# Patient Record
Sex: MALE | Race: BLACK OR AFRICAN AMERICAN
[De-identification: names, ages, dates, MRNs, and addresses within clinical notes are randomized per-mention and may not be internally consistent; named-entity substitution may affect disease eponyms.]

---

## 2017-06-26 ENCOUNTER — HOSPITAL ENCOUNTER (OUTPATIENT)
Dept: HOSPITAL 83 - LAB | Age: 47
End: 2017-06-26
Attending: INTERNAL MEDICINE
Payer: COMMERCIAL

## 2017-06-26 DIAGNOSIS — E11.65: ICD-10-CM

## 2017-06-26 DIAGNOSIS — E11.22: ICD-10-CM

## 2017-06-26 DIAGNOSIS — N18.2: ICD-10-CM

## 2017-06-26 DIAGNOSIS — E55.9: ICD-10-CM

## 2017-06-26 DIAGNOSIS — I12.9: Primary | ICD-10-CM

## 2017-06-26 LAB
25(OH)D3 SERPL-MCNC: 24.2 NG/ML (ref 30–100)
ABSOLUTE BASO #: 0.1 10*3/UL (ref 0–0.1)
ABSOLUTE EOS #: 0.2 10*3/UL (ref 0–0.4)
ABSOLUTE NEUT #: 7.6 10*3/UL (ref 2.3–7.9)
ALBUMIN SERPL-MCNC: 3.9 GM/DL (ref 3.1–4.5)
ALBUMIN: 3.9 GM/DL (ref 3.1–4.5)
ALP BLD-CCNC: 82 U/L (ref 45–117)
ALP SERPL-CCNC: 82 U/L (ref 45–117)
ALT SERPL W P-5'-P-CCNC: 36 U/L (ref 12–78)
ALT SERPL-CCNC: 36 U/L (ref 12–78)
AST SERPL-CCNC: 18 IU/L (ref 3–35)
AST SERPL-CCNC: 18 IU/L (ref 3–35)
BASOPHILS # BLD AUTO: 0.1 10*3/UL (ref 0–0.1)
BASOPHILS %: 0.7 % (ref 0–1)
BASOPHILS NFR BLD AUTO: 0.7 % (ref 0–1)
BILIRUB SERPL-MCNC: 0.8 MG/DL (ref 0.2–1)
BUN BLDV-MCNC: 10 MG/DL (ref 7–24)
BUN SERPL-MCNC: 10 MG/DL (ref 7–24)
CALCIUM SERPL-MCNC: 8.8 MG/DL (ref 8.5–10.5)
CHLORIDE BLD-SCNC: 106 MMOL/L (ref 98–107)
CHLORIDE SERPL-SCNC: 106 MMOL/L (ref 98–107)
CHOLEST SERPL-MCNC: 149 MG/DL (ref ?–200)
CHOLESTEROL: 149 MG/DL
CO2 SERPL-SCNC: 24 MMOL/L (ref 21–32)
CO2: 24 MMOL/L (ref 21–32)
CREAT SERPL-MCNC: 1.24 MG/DL (ref 0.7–1.3)
EOSINOPHIL # BLD AUTO: 0.2 10*3/UL (ref 0–0.4)
EOSINOPHIL # BLD AUTO: 1.4 % (ref 1–4)
EOSINOPHILS %: 1.4 % (ref 1–4)
ERYTHROCYTE [DISTWIDTH] IN BLOOD BY AUTOMATED COUNT: 12.8 % (ref 0–14.5)
EST. AVERAGE GLUCOSE BLD GHB EST-MCNC: 200 MG/DL
ESTIMATED AVERAGE GLUCOSE: 200
GFR AFRICAN AMERICAN: > 60 ML/MIN
GFR SERPL CREATININE-BSD FRML MDRD: >60 ML/MIN/
GLUCOSE SERPL-MCNC: 171 MG/DL (ref 65–99)
GLUCOSE: 171 MG/DL (ref 65–99)
HBA1C MFR BLD: 8.6 % (ref 4.8–5.6)
HCT VFR BLD AUTO: 47.9 % (ref 42–52)
HCT VFR BLD CALC: 47.9 % (ref 42–52)
HDLC SERPL-MCNC: 48 MG/DL (ref 40–60)
HDLC SERPL-MCNC: 48 MG/DL (ref 40–60)
HEMOGLOBIN: 16.7 G/DL (ref 14–18)
HGB BLD-MCNC: 16.7 G/DL (ref 14–18)
IG #: 0.1 10*3/UL (ref 0–0.1)
IMMATURE GRANULOCYTES #: 0.1 10*3/UL (ref 0–0.1)
IMMATURE GRANULOCYTES: 0.6 % (ref 0–1)
LDL CHOLESTEROL: 77 MG/DL (ref 9–159)
LDLC SERPL DIRECT ASSAY-MCNC: 77 MG/DL (ref 9–159)
LYMPHOCYTE %: 21.3 % (ref 27–41)
LYMPHOCYTES # BLD AUTO: 2.4 10*3/UL (ref 1.3–4.4)
LYMPHOCYTES # BLD: 2.4 10*3/UL (ref 1.3–4.4)
LYMPHOCYTES NFR BLD AUTO: 21.3 % (ref 27–41)
MCH RBC QN AUTO: 30.8 PG (ref 27–31)
MCH RBC QN AUTO: 30.8 PG (ref 27–31)
MCHC RBC AUTO-ENTMCNC: 34.9 G/DL (ref 33–37)
MCHC RBC AUTO-ENTMCNC: 34.9 G/DL (ref 33–37)
MCV RBC AUTO: 88.4 FL (ref 80–94)
MCV RBC AUTO: 88.4 FL (ref 80–94)
MONOCYTES # BLD AUTO: 1 10*3/UL (ref 0.1–1)
MONOCYTES # BLD: 1 10*3/UL (ref 0.1–1)
MONOCYTES %: 9.1 % (ref 3–9)
MONOCYTES NFR BLD MANUAL: 9.1 % (ref 3–9)
NEUT #: 7.6 10*3/UL (ref 2.3–7.9)
NEUT %: 66.9 % (ref 47–73)
NEUTROPHILS %: 66.9 % (ref 47–73)
NRBC BLD QL AUTO: 0 % (ref 0–0)
NUCLEATED RED BLOOD CELLS: 0 % (ref 0–0)
PDW BLD-RTO: 12.8 % (ref 0–14.5)
PHOSPHATE SERPL-MCNC: 2.5 MG/DL (ref 2.5–4.9)
PHOSPHORUS: 2.5 MG/DL (ref 2.5–4.9)
PLATELET # BLD AUTO: 321 10*3/UL (ref 130–400)
PLATELET # BLD: 321 10*3/UL (ref 130–400)
PMV BLD AUTO: 9.9 FL (ref 9.6–12.3)
PMV BLD AUTO: 9.9 FL (ref 9.6–12.3)
POTASSIUM SERPL-SCNC: 4.1 MMOL/L (ref 3.5–5.1)
POTASSIUM SERPL-SCNC: 4.1 MMOL/L (ref 3.5–5.1)
PROT SERPL-MCNC: 8 GM/DL (ref 6.4–8.2)
PTH INTACT: 58.9 PG/ML (ref 14–72)
PTH-INTACT SERPL-MCNC: 58.9 PG/ML (ref 14–72)
RBC # BLD AUTO: 5.42 10*6/UL (ref 4.5–5.9)
RBC # BLD: 5.42 10*6/UL (ref 4.5–5.9)
SODIUM BLD-SCNC: 139 MMOL/L (ref 136–145)
SODIUM SERPL-SCNC: 139 MMOL/L (ref 136–145)
TOTAL PROTEIN: 8 GM/DL (ref 6.4–8.2)
TRIGL SERPL-MCNC: 119 MG/DL
TRIGL SERPL-MCNC: 119 MG/DL (ref ?–150)
VITAMIN D 25-HYDROXY: 24.2 NG/ML (ref 30–100)
VLDLC SERPL CALC-MCNC: 24 MG/DL (ref 6–40)
VLDLC SERPL CALC-MCNC: 24 MG/DL (ref 6–40)
WBC # BLD: 11.3 10*3/UL (ref 4.8–10.8)
WBC NRBC COR # BLD AUTO: 11.3 10*3/UL (ref 4.8–10.8)

## 2017-06-27 LAB
CREATININE URINE: 215.1 MG/DL
MICRO ALBUMIN/CRE RATIO: 339.2 (ref 0–30)
MICROALBUMIN/CREAT 24H UR: 729.7 UG/ML
MICROALBUMIN/CREAT UR-RTO: 339.2 (ref 0–30)

## 2018-04-18 ENCOUNTER — HOSPITAL ENCOUNTER (EMERGENCY)
Dept: HOSPITAL 83 - ED | Age: 48
Discharge: HOME | End: 2018-04-18
Payer: COMMERCIAL

## 2018-04-18 VITALS — WEIGHT: 275 LBS | HEIGHT: 60 IN

## 2018-04-18 VITALS — SYSTOLIC BLOOD PRESSURE: 120 MMHG | DIASTOLIC BLOOD PRESSURE: 40 MMHG
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
## 2018-04-18 DIAGNOSIS — Z79.899: ICD-10-CM

## 2018-04-18 DIAGNOSIS — S93.402A: Primary | ICD-10-CM
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
## 2018-04-18 DIAGNOSIS — S50.02XA: ICD-10-CM
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
## 2018-06-05 ENCOUNTER — HOSPITAL ENCOUNTER (OUTPATIENT)
Dept: HOSPITAL 83 - LAB | Age: 48
Discharge: HOME | End: 2018-06-05
Attending: INTERNAL MEDICINE
Payer: COMMERCIAL

## 2018-06-05 DIAGNOSIS — R79.89: ICD-10-CM

## 2018-06-05 DIAGNOSIS — E11.65: ICD-10-CM

## 2018-06-05 DIAGNOSIS — I12.9: Primary | ICD-10-CM

## 2018-06-05 DIAGNOSIS — N18.2: ICD-10-CM

## 2018-06-05 LAB
25(OH)D3 SERPL-MCNC: 24.6 NG/ML (ref 30–100)
ABSOLUTE BASO #: 0.1 10*3/UL (ref 0–0.1)
ABSOLUTE EOS #: 0.2 10*3/UL (ref 0–0.4)
ABSOLUTE NEUT #: 7.2 10*3/UL (ref 2.3–7.9)
ALBUMIN SERPL-MCNC: 4.3 GM/DL (ref 3.1–4.5)
ALBUMIN: 4.3 GM/DL (ref 3.1–4.5)
ALP BLD-CCNC: 86 U/L (ref 45–117)
ALP SERPL-CCNC: 86 U/L (ref 45–117)
ALT SERPL W P-5'-P-CCNC: 29 U/L (ref 12–78)
ALT SERPL-CCNC: 29 U/L (ref 12–78)
AST SERPL-CCNC: 18 IU/L (ref 3–35)
AST SERPL-CCNC: 18 IU/L (ref 3–35)
BASOPHILS # BLD AUTO: 0.1 10*3/UL (ref 0–0.1)
BASOPHILS %: 0.6 % (ref 0–1)
BASOPHILS NFR BLD AUTO: 0.6 % (ref 0–1)
BILIRUB SERPL-MCNC: 0.4 MG/DL (ref 0.2–1)
BUN BLDV-MCNC: 8 MG/DL (ref 7–24)
BUN SERPL-MCNC: 8 MG/DL (ref 7–24)
CALCIUM SERPL-MCNC: 9.1 MG/DL (ref 8.5–10.5)
CHLORIDE BLD-SCNC: 103 MMOL/L (ref 98–107)
CHLORIDE SERPL-SCNC: 103 MMOL/L (ref 98–107)
CO2: 27 MMOL/L (ref 21–32)
CREAT SERPL-MCNC: 1.18 MG/DL (ref 0.7–1.3)
CREAT SERPL-MCNC: 1.18 MG/DL (ref 0.7–1.3)
EOSINOPHIL # BLD AUTO: 0.2 10*3/UL (ref 0–0.4)
EOSINOPHIL # BLD AUTO: 1.6 % (ref 1–4)
EOSINOPHILS %: 1.6 % (ref 1–4)
ERYTHROCYTE [DISTWIDTH] IN BLOOD BY AUTOMATED COUNT: 12.7 % (ref 0–14.5)
GFR AFRICAN AMERICAN: > 60 ML/MIN
GFR SERPL CREATININE-BSD FRML MDRD: >60 ML/MIN/
GLUCOSE: 113 MG/DL (ref 65–99)
HCT VFR BLD AUTO: 48.6 % (ref 42–52)
HCT VFR BLD CALC: 48.6 % (ref 42–52)
HEMOGLOBIN: 16.6 G/DL (ref 14–18)
HGB BLD-MCNC: 16.6 G/DL (ref 14–18)
IMMATURE GRANULOCYTES #: 0.1 10*3/UL (ref 0–0.1)
IMMATURE GRANULOCYTES: 0.6 % (ref 0–1)
LYMPHOCYTE %: 24.3 % (ref 27–41)
LYMPHOCYTES # BLD AUTO: 2.8 10*3/UL (ref 1.3–4.4)
LYMPHOCYTES # BLD: 2.8 10*3/UL (ref 1.3–4.4)
LYMPHOCYTES NFR BLD AUTO: 24.3 % (ref 27–41)
MCH RBC QN AUTO: 30.6 PG (ref 27–31)
MCH RBC QN AUTO: 30.6 PG (ref 27–31)
MCHC RBC AUTO-ENTMCNC: 34.2 G/DL (ref 33–37)
MCHC RBC AUTO-ENTMCNC: 34.2 G/DL (ref 33–37)
MCV RBC AUTO: 89.7 FL (ref 80–94)
MCV RBC AUTO: 89.7 FL (ref 80–94)
MONOCYTES # BLD AUTO: 1.1 10*3/UL (ref 0.1–1)
MONOCYTES # BLD: 1.1 10*3/UL (ref 0.1–1)
MONOCYTES %: 9.8 % (ref 3–9)
MONOCYTES NFR BLD MANUAL: 9.8 % (ref 3–9)
NEUT #: 7.2 10*3/UL (ref 2.3–7.9)
NEUT %: 63.1 % (ref 47–73)
NEUTROPHILS %: 63.1 % (ref 47–73)
NRBC BLD QL AUTO: 0 10*3/UL (ref 0–0)
NUCLEATED RED BLOOD CELLS: 0 % (ref 0–0)
PDW BLD-RTO: 12.7 % (ref 0–14.5)
PHOSPHATE SERPL-MCNC: 3 MG/DL (ref 2.5–4.9)
PHOSPHORUS: 3 MG/DL (ref 2.5–4.9)
PLATELET # BLD AUTO: 309 10*3/UL (ref 130–400)
PLATELET # BLD: 309 10*3/UL (ref 130–400)
PMV BLD AUTO: 9.8 FL (ref 9.6–12.3)
PMV BLD AUTO: 9.8 FL (ref 9.6–12.3)
POTASSIUM SERPL-SCNC: 3.7 MMOL/L (ref 3.5–5.1)
POTASSIUM SERPL-SCNC: 3.7 MMOL/L (ref 3.5–5.1)
PROT SERPL-MCNC: 8.2 GM/DL (ref 6.4–8.2)
PTH INTACT: 53.6 PG/ML (ref 14–72)
PTH-INTACT SERPL-MCNC: 53.6 PG/ML (ref 14–72)
RBC # BLD AUTO: 5.42 10*6/UL (ref 4.5–5.9)
RBC # BLD: 5.42 10*6/UL (ref 4.5–5.9)
SODIUM BLD-SCNC: 140 MMOL/L (ref 136–145)
SODIUM SERPL-SCNC: 140 MMOL/L (ref 136–145)
TOTAL PROTEIN: 8.2 GM/DL (ref 6.4–8.2)
VITAMIN D 25-HYDROXY: 24.6 NG/ML (ref 30–100)
WBC # BLD: 11.4 10*3/UL (ref 4.8–10.8)
WBC NRBC COR # BLD AUTO: 11.4 10*3/UL (ref 4.8–10.8)

## 2018-06-06 LAB
CREAT UR-MCNC: 210.1 MG/DL
CREATININE URINE: 210.1 MG/DL
MICRO ALBUMIN/CRE RATIO: 168.4 (ref 0–30)
MICROALBUMIN/CREAT 24H UR: 353.9 UG/ML
MICROALBUMIN/CREAT UR-RTO: 168.4 (ref 0–30)

## 2018-10-05 ENCOUNTER — HOSPITAL ENCOUNTER (OUTPATIENT)
Dept: HOSPITAL 83 - MRI | Age: 48
Discharge: HOME | End: 2018-10-05
Attending: PSYCHIATRY & NEUROLOGY
Payer: COMMERCIAL

## 2018-10-05 DIAGNOSIS — M79.632: ICD-10-CM

## 2018-10-05 DIAGNOSIS — M54.12: Primary | ICD-10-CM

## 2018-12-16 ENCOUNTER — HOSPITAL ENCOUNTER (INPATIENT)
Dept: HOSPITAL 83 - ED | Age: 48
LOS: 1 days | Discharge: HOME | DRG: 392 | End: 2018-12-17
Attending: INTERNAL MEDICINE | Admitting: INTERNAL MEDICINE
Payer: COMMERCIAL

## 2018-12-16 VITALS — HEIGHT: 72.99 IN | WEIGHT: 286.06 LBS | BODY MASS INDEX: 37.91 KG/M2

## 2018-12-16 VITALS — DIASTOLIC BLOOD PRESSURE: 79 MMHG

## 2018-12-16 VITALS — DIASTOLIC BLOOD PRESSURE: 64 MMHG

## 2018-12-16 VITALS — DIASTOLIC BLOOD PRESSURE: 43 MMHG

## 2018-12-16 VITALS — DIASTOLIC BLOOD PRESSURE: 49 MMHG

## 2018-12-16 DIAGNOSIS — E66.01: ICD-10-CM

## 2018-12-16 DIAGNOSIS — G47.30: ICD-10-CM

## 2018-12-16 DIAGNOSIS — E87.8: ICD-10-CM

## 2018-12-16 DIAGNOSIS — Z79.84: ICD-10-CM

## 2018-12-16 DIAGNOSIS — E11.22: ICD-10-CM

## 2018-12-16 DIAGNOSIS — K21.9: Primary | ICD-10-CM

## 2018-12-16 DIAGNOSIS — E83.41: ICD-10-CM

## 2018-12-16 DIAGNOSIS — F90.9: ICD-10-CM

## 2018-12-16 DIAGNOSIS — N18.9: ICD-10-CM

## 2018-12-16 DIAGNOSIS — E11.65: ICD-10-CM

## 2018-12-16 DIAGNOSIS — Z80.9: ICD-10-CM

## 2018-12-16 DIAGNOSIS — D72.829: ICD-10-CM

## 2018-12-16 LAB
ALBUMIN SERPL-MCNC: 4 GM/DL (ref 3.1–4.5)
ALP SERPL-CCNC: 88 U/L (ref 45–117)
ALT SERPL W P-5'-P-CCNC: 40 U/L (ref 12–78)
APTT PPP: 24.5 SECONDS (ref 20.8–31.5)
AST SERPL-CCNC: 19 IU/L (ref 3–35)
BASOPHILS # BLD AUTO: 0.1 10*3/UL (ref 0–0.1)
BASOPHILS NFR BLD AUTO: 0.7 % (ref 0–1)
BUN SERPL-MCNC: 12 MG/DL (ref 7–24)
CHLORIDE SERPL-SCNC: 108 MMOL/L (ref 98–107)
CREAT SERPL-MCNC: 1.35 MG/DL (ref 0.7–1.3)
EOSINOPHIL # BLD AUTO: 0.2 10*3/UL (ref 0–0.4)
EOSINOPHIL # BLD AUTO: 1.9 % (ref 1–4)
ERYTHROCYTE [DISTWIDTH] IN BLOOD BY AUTOMATED COUNT: 12.6 % (ref 0–14.5)
HCT VFR BLD AUTO: 47.5 % (ref 42–52)
HGB BLD-MCNC: 16.4 G/DL (ref 14–18)
INR BLD: 0.9 (ref 2–3.5)
LYMPHOCYTES # BLD AUTO: 2.3 10*3/UL (ref 1.3–4.4)
LYMPHOCYTES NFR BLD AUTO: 20.5 % (ref 27–41)
MCH RBC QN AUTO: 31.4 PG (ref 27–31)
MCHC RBC AUTO-ENTMCNC: 34.5 G/DL (ref 33–37)
MCV RBC AUTO: 91 FL (ref 80–94)
MONOCYTES # BLD AUTO: 1.2 10*3/UL (ref 0.1–1)
MONOCYTES NFR BLD MANUAL: 10.9 % (ref 3–9)
NEUT #: 7.3 10*3/UL (ref 2.3–7.9)
NEUT %: 64.8 % (ref 47–73)
NRBC BLD QL AUTO: 0 10*3/UL (ref 0–0)
PLATELET # BLD AUTO: 302 10*3/UL (ref 130–400)
PMV BLD AUTO: 9.7 FL (ref 9.6–12.3)
POTASSIUM SERPL-SCNC: 4.6 MMOL/L (ref 3.5–5.1)
PROT SERPL-MCNC: 8.1 GM/DL (ref 6.4–8.2)
RBC # BLD AUTO: 5.22 10*6/UL (ref 4.5–5.9)
SODIUM SERPL-SCNC: 143 MMOL/L (ref 136–145)
TROPONIN I SERPL-MCNC: < 0.015 NG/ML (ref ?–0.04)
WBC NRBC COR # BLD AUTO: 11.4 10*3/UL (ref 4.8–10.8)

## 2018-12-17 VITALS — DIASTOLIC BLOOD PRESSURE: 55 MMHG | SYSTOLIC BLOOD PRESSURE: 105 MMHG

## 2018-12-17 VITALS — SYSTOLIC BLOOD PRESSURE: 115 MMHG | DIASTOLIC BLOOD PRESSURE: 65 MMHG

## 2018-12-17 VITALS — DIASTOLIC BLOOD PRESSURE: 57 MMHG

## 2018-12-17 VITALS — SYSTOLIC BLOOD PRESSURE: 104 MMHG | DIASTOLIC BLOOD PRESSURE: 64 MMHG

## 2018-12-17 LAB
BASOPHILS # BLD AUTO: 0.1 10*3/UL (ref 0–0.1)
BASOPHILS NFR BLD AUTO: 0.6 % (ref 0–1)
BUN SERPL-MCNC: 16 MG/DL (ref 7–24)
CHLORIDE SERPL-SCNC: 107 MMOL/L (ref 98–107)
CHOLEST SERPL-MCNC: 141 MG/DL (ref ?–200)
CREAT SERPL-MCNC: 1.3 MG/DL (ref 0.7–1.3)
EOSINOPHIL # BLD AUTO: 0.4 10*3/UL (ref 0–0.4)
EOSINOPHIL # BLD AUTO: 3.3 % (ref 1–4)
ERYTHROCYTE [DISTWIDTH] IN BLOOD BY AUTOMATED COUNT: 12.8 % (ref 0–14.5)
HCT VFR BLD AUTO: 46 % (ref 42–52)
HDLC SERPL-MCNC: 31 MG/DL (ref 40–60)
HGB BLD-MCNC: 15.5 G/DL (ref 14–18)
LDLC SERPL DIRECT ASSAY-MCNC: 64 MG/DL (ref 9–159)
LYMPHOCYTES # BLD AUTO: 2.9 10*3/UL (ref 1.3–4.4)
LYMPHOCYTES NFR BLD AUTO: 23.2 % (ref 27–41)
MCH RBC QN AUTO: 30.9 PG (ref 27–31)
MCHC RBC AUTO-ENTMCNC: 33.7 G/DL (ref 33–37)
MCV RBC AUTO: 91.6 FL (ref 80–94)
MONOCYTES # BLD AUTO: 1.3 10*3/UL (ref 0.1–1)
MONOCYTES NFR BLD MANUAL: 10.5 % (ref 3–9)
NEUT #: 7.7 10*3/UL (ref 2.3–7.9)
NEUT %: 61.8 % (ref 47–73)
NRBC BLD QL AUTO: 0 10*3/UL (ref 0–0)
PHOSPHATE SERPL-MCNC: 3.3 MG/DL (ref 2.5–4.9)
PLATELET # BLD AUTO: 285 10*3/UL (ref 130–400)
PMV BLD AUTO: 9.8 FL (ref 9.6–12.3)
POTASSIUM SERPL-SCNC: 4.4 MMOL/L (ref 3.5–5.1)
RBC # BLD AUTO: 5.02 10*6/UL (ref 4.5–5.9)
SODIUM SERPL-SCNC: 142 MMOL/L (ref 136–145)
T4 FREE SERPL-MCNC: 0.84 NG/DL (ref 0.76–1.46)
TRIGL SERPL-MCNC: 232 MG/DL (ref ?–150)
TSH SERPL DL<=0.005 MIU/L-ACNC: 3.31 UIU/ML (ref 0.36–4.75)
VLDLC SERPL CALC-MCNC: 46 MG/DL (ref 6–40)
WBC NRBC COR # BLD AUTO: 12.4 10*3/UL (ref 4.8–10.8)

## 2018-12-17 PROCEDURE — 3E073KZ INTRODUCTION OF OTHER DIAGNOSTIC SUBSTANCE INTO CORONARY ARTERY, PERCUTANEOUS APPROACH: ICD-10-PCS | Performed by: INTERNAL MEDICINE

## 2018-12-17 PROCEDURE — 4A02XM4 MEASUREMENT OF CARDIAC TOTAL ACTIVITY, EXTERNAL APPROACH: ICD-10-PCS | Performed by: INTERNAL MEDICINE

## 2019-09-13 ENCOUNTER — HOSPITAL ENCOUNTER (OUTPATIENT)
Dept: HOSPITAL 83 - LAB | Age: 49
Discharge: HOME | End: 2019-09-13
Attending: INTERNAL MEDICINE
Payer: COMMERCIAL

## 2019-09-13 DIAGNOSIS — E11.65: ICD-10-CM

## 2019-09-13 DIAGNOSIS — N18.2: ICD-10-CM

## 2019-09-13 DIAGNOSIS — E11.22: ICD-10-CM

## 2019-09-13 DIAGNOSIS — I12.9: Primary | ICD-10-CM

## 2019-09-13 LAB
25(OH)D3 SERPL-MCNC: 22 NG/ML (ref 30–100)
ALBUMIN SERPL-MCNC: 4 GM/DL (ref 3.1–4.5)
ALP SERPL-CCNC: 78 U/L (ref 45–117)
ALT SERPL W P-5'-P-CCNC: 37 U/L (ref 12–78)
AST SERPL-CCNC: 18 IU/L (ref 3–35)
BASOPHILS # BLD AUTO: 0.1 10*3/UL (ref 0–0.1)
BASOPHILS NFR BLD AUTO: 0.7 % (ref 0–1)
BUN SERPL-MCNC: 12 MG/DL (ref 7–24)
CHLORIDE SERPL-SCNC: 107 MMOL/L (ref 98–107)
CHOLEST SERPL-MCNC: 148 MG/DL (ref ?–200)
CREAT SERPL-MCNC: 1.24 MG/DL (ref 0.7–1.3)
EOSINOPHIL # BLD AUTO: 0.3 10*3/UL (ref 0–0.4)
EOSINOPHIL # BLD AUTO: 2.3 % (ref 1–4)
ERYTHROCYTE [DISTWIDTH] IN BLOOD BY AUTOMATED COUNT: 12.9 % (ref 0–14.5)
HCT VFR BLD AUTO: 48.9 % (ref 42–52)
HDLC SERPL-MCNC: 44 MG/DL (ref 40–60)
HGB BLD-MCNC: 16.6 G/DL (ref 14–18)
LDLC SERPL DIRECT ASSAY-MCNC: 75 MG/DL (ref 9–159)
LYMPHOCYTES # BLD AUTO: 2.5 10*3/UL (ref 1.3–4.4)
LYMPHOCYTES NFR BLD AUTO: 20.8 % (ref 27–41)
MCH RBC QN AUTO: 31.3 PG (ref 27–31)
MCHC RBC AUTO-ENTMCNC: 33.9 G/DL (ref 33–37)
MCV RBC AUTO: 92.3 FL (ref 80–94)
MONOCYTES # BLD AUTO: 1.2 10*3/UL (ref 0.1–1)
MONOCYTES NFR BLD MANUAL: 9.9 % (ref 3–9)
NEUT #: 7.9 10*3/UL (ref 2.3–7.9)
NEUT %: 65.5 % (ref 47–73)
NRBC BLD QL AUTO: 0 % (ref 0–0)
PHOSPHATE SERPL-MCNC: 2 MG/DL (ref 2.5–4.9)
PLATELET # BLD AUTO: 342 10*3/UL (ref 130–400)
PMV BLD AUTO: 9.6 FL (ref 9.6–12.3)
POTASSIUM SERPL-SCNC: 3.6 MMOL/L (ref 3.5–5.1)
PROT SERPL-MCNC: 8 GM/DL (ref 6.4–8.2)
PTH-INTACT SERPL-MCNC: 80.9 PG/ML (ref 18.5–88)
RBC # BLD AUTO: 5.3 10*6/UL (ref 4.5–5.9)
SODIUM SERPL-SCNC: 140 MMOL/L (ref 136–145)
TRIGL SERPL-MCNC: 146 MG/DL (ref ?–150)
VLDLC SERPL CALC-MCNC: 29 MG/DL (ref 6–40)
WBC NRBC COR # BLD AUTO: 12.1 10*3/UL (ref 4.8–10.8)

## 2019-09-14 LAB
CREAT UR-MCNC: 180.4 MG/DL
MICRO ALBUMIN/CRE RATIO: 102.9 (ref 0–30)

## 2020-07-16 ENCOUNTER — HOSPITAL ENCOUNTER (OUTPATIENT)
Dept: GENERAL RADIOLOGY | Age: 50
Discharge: HOME | End: 2020-07-18
Payer: COMMERCIAL

## 2020-07-16 ENCOUNTER — HOSPITAL ENCOUNTER (OUTPATIENT)
Age: 50
Discharge: HOME | End: 2020-07-18
Payer: COMMERCIAL

## 2020-07-16 PROCEDURE — 72170 X-RAY EXAM OF PELVIS: CPT

## 2020-07-16 PROCEDURE — 73564 X-RAY EXAM KNEE 4 OR MORE: CPT

## 2020-09-28 ENCOUNTER — TELEPHONE (OUTPATIENT)
Dept: NEUROSURGERY | Age: 50
End: 2020-09-28

## 2020-10-13 ENCOUNTER — HOSPITAL ENCOUNTER (OUTPATIENT)
Age: 50
Discharge: HOME | End: 2020-10-15

## 2020-10-13 PROCEDURE — 88305 TISSUE EXAM BY PATHOLOGIST: CPT

## 2020-10-13 PROCEDURE — 88342 IMHCHEM/IMCYTCHM 1ST ANTB: CPT

## 2020-10-19 ENCOUNTER — INITIAL CONSULT (OUTPATIENT)
Dept: NEUROSURGERY | Age: 50
End: 2020-10-19
Payer: COMMERCIAL

## 2020-10-19 VITALS
HEART RATE: 79 BPM | SYSTOLIC BLOOD PRESSURE: 117 MMHG | HEIGHT: 73 IN | TEMPERATURE: 97.8 F | BODY MASS INDEX: 37.77 KG/M2 | WEIGHT: 285 LBS | DIASTOLIC BLOOD PRESSURE: 82 MMHG

## 2020-10-19 PROCEDURE — 99203 OFFICE O/P NEW LOW 30 MIN: CPT | Performed by: PHYSICIAN ASSISTANT

## 2020-10-19 PROCEDURE — G8427 DOCREV CUR MEDS BY ELIG CLIN: HCPCS | Performed by: PHYSICIAN ASSISTANT

## 2020-10-19 PROCEDURE — 1036F TOBACCO NON-USER: CPT | Performed by: PHYSICIAN ASSISTANT

## 2020-10-19 PROCEDURE — G8484 FLU IMMUNIZE NO ADMIN: HCPCS | Performed by: PHYSICIAN ASSISTANT

## 2020-10-19 PROCEDURE — G8417 CALC BMI ABV UP PARAM F/U: HCPCS | Performed by: PHYSICIAN ASSISTANT

## 2020-10-19 RX ORDER — PIOGLITAZONEHYDROCHLORIDE 15 MG/1
TABLET ORAL
COMMUNITY
Start: 2020-10-14

## 2020-10-19 RX ORDER — ALPRAZOLAM 0.5 MG/1
TABLET ORAL PRN
COMMUNITY

## 2020-10-19 RX ORDER — METHYLPHENIDATE HYDROCHLORIDE 27 MG/1
27 TABLET ORAL PRN
COMMUNITY
End: 2022-05-16

## 2020-10-19 RX ORDER — DULAGLUTIDE 1.5 MG/.5ML
1.5 INJECTION, SOLUTION SUBCUTANEOUS WEEKLY
COMMUNITY
Start: 2020-10-16

## 2020-10-19 RX ORDER — GLIMEPIRIDE 4 MG/1
4 TABLET ORAL 2 TIMES DAILY
COMMUNITY

## 2020-10-19 RX ORDER — PANTOPRAZOLE SODIUM 40 MG/1
40 TABLET, DELAYED RELEASE ORAL DAILY
COMMUNITY

## 2020-10-19 NOTE — PROGRESS NOTES
[address]  NEUROSURGERY     Patient: Luis M Gordon  : 1970  MRN: [id number]    Date of Service: 10/19/2020    Reason for Referral: Back Pain     History of Present Illness: This is a 53 yo male who presents with back pain x 1 year s/p lifting injury. States he heard and felt a \"pop\" at the time of the incident. C/o 6-7/10 pain on average, constant, and a combination of descriptors. Admits to radiation into the left hip and groin--states his hip and groin pain tend to be at the worse. F/U with GS to r/o hernia--no surgical issues. Admits to some n/t following the same pattern. Denies leg weakness. Has tried PT and chiropractic care with some relief. Denies seeing a Pain Specialist. No loss of bowel or bladder function. Ambulating independently. Allergies:   Patient has no known allergies. Past Medical History:      Diagnosis Date    Acid reflux     DM [facility]diabetes mellitus) ([facility] [address]     Protein in urine        Surgical History:      Procedure Laterality Date    PATELLA SURGERY Right     UPPER GASTROINTESTINAL ENDOSCOPY  10/2020       Social History:   reports that he has never smoked. He has never used smokeless tobacco.   has no history on file for alcohol. Family History:  No family history on file.     Review of Systems:  Denies fever, chills, or night sweats  Denies headache, dizziness, syncope  Denies blurred vision, double vision  Denies chest pain, palpitations, SOB  Denies diarrhea, constipation, n/v  Denies dysuria, hematuria  Denies recent infections  Denies easy bruising  Denies anxiety, depression    Physical Exam:  WDWN, resting comfortable, no apparent distress  Appears stated age  Vitals stable  Non-labored breathing   A&O x 3, normal affect   Head is normocephalic, atraumatic   No palpable lymphadenopathy   Abdomen soft, nontender  Pupils equal and reactive, no scleral icterus  EOMI bilaterally  Cranial nerves II-XII intact bilaterally  No drift  5/5 in BUE  5/5 in BLE  Sensation to LT intact x 4 ext  Toes going down  Skin warm and dry  +Left CARMELA    Review of Imaging: None     Assessment: Patient with back and left hip pain. Stable.      Plan:  -Activities as tolerated  -MRI lumbar spine  -Hip x-rays  -Will call with results and treatment plan

## 2020-10-19 NOTE — PATIENT INSTRUCTIONS

## 2020-10-19 NOTE — LETTER
[address] [address]  Phone: [phone]  Fax: [phone]    Love Pandya MD        2020       Patient: Naila Sylvester   MR Number: [id number]   YOB: 1970   Date of Visit: 10/19/2020       Dear Dr. Perez Blancas:    Thank you for the request for consultation for Naila Sylvester to me for the evaluation. Below are the relevant portions of my assessment and plan of care. Vitals:    10/19/20 1149   BP: 117/82   Site: Left Upper Arm   Position: Sitting   Pulse: 79   Temp: 97.8 °F (36.6 °C)   Weight: 285 lb (129.3 kg)   Height: 6' 1\" (1.854 m)     [address] [place] [address]     Patient: Naila Sylvester  : 1970  MRN: [id number]    Date of Service: 10/19/2020    Reason for Referral: Back Pain     History of Present Illness: This is a 55 yo male who presents with back pain x 1 year s/p lifting injury. States he heard and felt a \"pop\" at the time of the incident. C/o 6-7/10 pain on average, constant, and a combination of descriptors. Admits to radiation into the left hip and groin--states his hip and groin pain tend to be at the worse. F/U with GS to r/o hernia--no surgical issues. Admits to some n/t following the same pattern. Denies leg weakness. Has tried PT and chiropractic care with some relief. Denies seeing a Pain Specialist. No loss of bowel or bladder function. Ambulating independently. Allergies:   Patient has no known allergies. Past Medical History:      Diagnosis Date    Acid reflux     DM (diabetes mellitus) ([facility] [address]     Protein in urine        Surgical History:      Procedure Laterality Date    PATELLA SURGERY Right     UPPER GASTROINTESTINAL ENDOSCOPY  10/2020       Social History:   reports that he has never smoked. He has never used smokeless tobacco.   has no history on file for alcohol. Family History:  No family history on file.     Review of Systems: Denies fever, chills, or night sweats  Denies headache, dizziness, syncope  Denies blurred vision, double vision  Denies chest pain, palpitations, SOB  Denies diarrhea, constipation, n/v  Denies dysuria, hematuria  Denies recent infections  Denies easy bruising  Denies anxiety, depression    Physical Exam:  WDWN, resting comfortable, no apparent distress  Appears stated age  Vitals stable  Non-labored breathing   A&O x 3, normal affect   Head is normocephalic, atraumatic   No palpable lymphadenopathy   Abdomen soft, nontender  Pupils equal and reactive, no scleral icterus  EOMI bilaterally  Cranial nerves II-XII intact bilaterally  No drift  5/5 in BUE  5/5 in BLE  Sensation to LT intact x 4 ext  Toes going down  Skin warm and dry  +Left CARMELA    Review of Imaging: None     Assessment: Patient with back and left hip pain. Stable. Plan:  -Activities as tolerated  -MRI lumbar spine  -Hip x-rays  -Will call with results and treatment plan                 If you have questions, please do not hesitate to call me. I look forward to following Genaro along with you.     Sincerely,        POLLY Davalos    CC providers:  Shivani Burks  [address] [place] [address]

## 2020-11-05 ENCOUNTER — TELEPHONE (OUTPATIENT)
Dept: NEUROSURGERY | Age: 50
End: 2020-11-05

## 2020-11-05 NOTE — TELEPHONE ENCOUNTER
Spoke with patient regarding Denial for MRI. Patient previously provided Peer to Peer #[phone] option 1 stating anyone can do the peer to peer and if we call and inform them he has seen a chiropractor, Janell Mills [phone] and Dr. Owen Carrera [phone] for the last year they will approve the MRI. Requested notes twice and have not received them. Spoke with Ashli Quintanilla regarding Peer to Peer. Per Ashli Quintanilla, MRI was denied because patient needs 6 weeks of PT before they will approve it and insurance will not approve the MRI if she calls and tries to do a Peer to Peer with him going to just the chiropractor-they will deny it. Patient notified and informed him insurance companies tell patients chiropractor notes are enough to approve MRI's but they want PT. Patient got loud and stated he doesn't mind going to PT but \"for what the MRI is to show was he needs to go to PT for\". I was not able to speak due to patient. He stated he is going off of the \"raw data\" and that is he spoke to me asking about PT and chiropractor notes and then he spoke with his insurance and was told his MRI was sent to his insurance without the chiropractor notes. I informed patient I contacted his chiropractors office for his notes twice and I have not received any notes to send to his insurance. Patient stated \"I'll take care of this. You'll be hearing from me! \" and then hung up on me.

## 2020-12-20 ENCOUNTER — HOSPITAL ENCOUNTER (EMERGENCY)
Age: 50
Discharge: HOME | End: 2020-12-20
Attending: EMERGENCY MEDICINE
Payer: COMMERCIAL

## 2020-12-20 VITALS
HEART RATE: 98 BPM | BODY MASS INDEX: 37.77 KG/M2 | RESPIRATION RATE: 18 BRPM | TEMPERATURE: 97.3 F | DIASTOLIC BLOOD PRESSURE: 81 MMHG | HEIGHT: 73 IN | WEIGHT: 285 LBS | SYSTOLIC BLOOD PRESSURE: 134 MMHG | OXYGEN SATURATION: 98 %

## 2020-12-20 PROCEDURE — 90715 TDAP VACCINE 7 YRS/> IM: CPT | Performed by: STUDENT IN AN ORGANIZED HEALTH CARE EDUCATION/TRAINING PROGRAM

## 2020-12-20 PROCEDURE — 90471 IMMUNIZATION ADMIN: CPT | Performed by: STUDENT IN AN ORGANIZED HEALTH CARE EDUCATION/TRAINING PROGRAM

## 2020-12-20 PROCEDURE — 99283 EMERGENCY DEPT VISIT LOW MDM: CPT

## 2020-12-20 PROCEDURE — 6360000002 HC RX W HCPCS: Performed by: STUDENT IN AN ORGANIZED HEALTH CARE EDUCATION/TRAINING PROGRAM

## 2020-12-20 PROCEDURE — 10060 I&D ABSCESS SIMPLE/SINGLE: CPT

## 2020-12-20 RX ORDER — LIDOCAINE HYDROCHLORIDE AND EPINEPHRINE 10; 10 MG/ML; UG/ML
INJECTION, SOLUTION INFILTRATION; PERINEURAL
Status: DISCONTINUED
Start: 2020-12-20 | End: 2020-12-20 | Stop reason: HOSPADM

## 2020-12-20 RX ORDER — HYDROCODONE BITARTRATE AND ACETAMINOPHEN 5; 325 MG/1; MG/1
1 TABLET ORAL EVERY 8 HOURS PRN
Qty: 9 TABLET | Refills: 0 | Status: SHIPPED | OUTPATIENT
Start: 2020-12-20 | End: 2020-12-23

## 2020-12-20 RX ORDER — SULFAMETHOXAZOLE AND TRIMETHOPRIM 800; 160 MG/1; MG/1
1 TABLET ORAL 2 TIMES DAILY
Qty: 14 TABLET | Refills: 0 | Status: SHIPPED | OUTPATIENT
Start: 2020-12-20 | End: 2020-12-27

## 2020-12-20 RX ORDER — LIDOCAINE HYDROCHLORIDE 10 MG/ML
5 INJECTION, SOLUTION INFILTRATION; PERINEURAL ONCE
Status: DISCONTINUED | OUTPATIENT
Start: 2020-12-20 | End: 2020-12-20 | Stop reason: HOSPADM

## 2020-12-20 RX ORDER — MUPIROCIN CALCIUM 20 MG/G
CREAM TOPICAL
Qty: 1 TUBE | Refills: 0 | Status: SHIPPED | OUTPATIENT
Start: 2020-12-20 | End: 2021-01-19

## 2020-12-20 RX ADMIN — TETANUS TOXOID, REDUCED DIPHTHERIA TOXOID AND ACELLULAR PERTUSSIS VACCINE, ADSORBED 0.5 ML: 5; 2.5; 8; 8; 2.5 SUSPENSION INTRAMUSCULAR at 08:37

## 2020-12-20 ASSESSMENT — ENCOUNTER SYMPTOMS
DIARRHEA: 0
NAUSEA: 0
ABDOMINAL DISTENTION: 0
SHORTNESS OF BREATH: 0
ABDOMINAL PAIN: 0
COLOR CHANGE: 0
VOMITING: 0
CONSTIPATION: 0

## 2020-12-20 ASSESSMENT — PAIN SCALES - GENERAL: PAINLEVEL_OUTOF10: 10

## 2020-12-20 ASSESSMENT — PAIN DESCRIPTION - LOCATION: LOCATION: UMBILICUS

## 2020-12-20 ASSESSMENT — PAIN DESCRIPTION - DESCRIPTORS: DESCRIPTORS: ACHING;CONSTANT;DISCOMFORT

## 2020-12-20 ASSESSMENT — PAIN DESCRIPTION - ONSET: ONSET: SUDDEN

## 2020-12-20 ASSESSMENT — PAIN DESCRIPTION - FREQUENCY: FREQUENCY: CONTINUOUS

## 2020-12-20 ASSESSMENT — PAIN DESCRIPTION - PAIN TYPE: TYPE: ACUTE PAIN

## 2020-12-20 ASSESSMENT — PAIN DESCRIPTION - PROGRESSION: CLINICAL_PROGRESSION: GRADUALLY WORSENING

## 2020-12-20 NOTE — ED PROVIDER NOTES
for adenopathy. Does not bruise/bleed easily. Psychiatric/Behavioral: Negative for agitation. Physical Exam  Vitals signs and nursing note reviewed. Constitutional:       General: He is not in acute distress. Appearance: Normal appearance. He is not ill-appearing or diaphoretic. HENT:      Head: Normocephalic and atraumatic. Nose: Nose normal.   Eyes:      Extraocular Movements: Extraocular movements intact. Pupils: Pupils are equal, round, and reactive to light. Neck:      Musculoskeletal: Normal range of motion. Cardiovascular:      Rate and Rhythm: Normal rate and regular rhythm. Pulses: Normal pulses. Heart sounds: Normal heart sounds. Pulmonary:      Effort: Pulmonary effort is normal.      Breath sounds: Normal breath sounds. Abdominal:      General: Bowel sounds are normal.      Palpations: Abdomen is soft. Tenderness: There is no abdominal tenderness. There is no right CVA tenderness, left CVA tenderness or rebound. Negative signs include Bell's sign, Rovsing's sign and McBurney's sign. Comments: At umbilicus there is a mildly tender fluctuant mass. There is area of induration above it. Measures 2 cm x 2 cm. No areas of cellulitis. No areas of erythema. Musculoskeletal: Normal range of motion. Skin:     General: Skin is warm and dry. Capillary Refill: Capillary refill takes less than 2 seconds. Neurological:      General: No focal deficit present. Mental Status: He is alert and oriented to person, place, and time. Psychiatric:         Mood and Affect: Mood normal.            Procedures   PROCEDURE  12/21/20       Time:     INCISION AND DRAINAGE  Risks, benefits and alternatives (for applicable procedures below) described. Performed By: Rob Bradley MD.    Indication: Abscess  Informed consent obtained: The patient provided verbal consent for this procedure. .  Prep: The skin was cleansed with povidone iodine and draped in a sterile fashion. Anesthetic: The wound area was anesthetized with Lidocaine 1% with epinephrine. Incision: Soft tissue abscess of umbilicus was Incised by scalpal and minimal fluid was drained. A wound culture was not obtained. The wound  was not irrigated and was not packed with iodoform gauze. The wound was then covered with a sterile dressing. Patient tolerated the procedure well. SKIN BEDSIDE ULTRASOUND     Risks, benefits and alternatives (for applicable procedures below) described. Performed By: Audrey Marcelo DO. Indication:  Pain, deformity. Informed consent: The patient provided verbal consent for this procedure. .  Procedural Quadrants:     REGION: Umbilicus (shows evidence of superficial abscess          This procedure was performed by Audrey Marcelo on 12/21/20 at 6:39 AM      MDM  Number of Diagnoses or Management Options  Abscess  Diagnosis management comments: 57-year-old male with past medical history of diabetes recently treated for an abscess on his umbilicus who has been outpatient antibiotics for the last 5 days. States has been worsening. Vitals within normal limits. On physical exam patient in no acute distress, speaking full sounds, alert and oriented x3. His abdomen is soft nontender, no rebound or guarding. Patient does have a small 2 cm x 2 cm fluctuant mass at the total position of his umbilicus. There is surrounding induration. It is mildly tender to touch. There is no extended areas of cellulitis or streaking. Ultrasound was placed on patient's abscess it is 0.5 cm below skin wall. The area was cleaned, draped, anesthetized and incised. Only 2 cc of serous fluid removed. Unable to get any purulent discharge. Did not want to go further with the I&D as it is at the patient's umbilicus. Patient informed of all the results of evaluation. He was given Bactrim and bacitracin at discharge. He was given close follow-up with his primary care physician.   He is agreeable plan. Patient is awake alert, hemodynamic stable, afebrile and in no respiratory distress. Discussed with patient plan for close outpatient follow-up with the patient's PCP as well as return precautions and the patient understands and agrees to the plan.                  --------------------------------------------- PAST HISTORY ---------------------------------------------  Past Medical History:  has a past medical history of Acid reflux, DM (diabetes mellitus) ([facility] [address] and Protein in urine. Past Surgical History:  has a past surgical history that includes Patella surgery (Right) and Upper gastrointestinal endoscopy (10/2020). Social History:  reports that he has never smoked. He has never used smokeless tobacco. He reports current alcohol use. He reports that he does not use drugs. Family History: family history is not on file. The patients home medications have been reviewed. Allergies: Patient has no known allergies. -------------------------------------------------- RESULTS -------------------------------------------------  Labs:  No results found for this visit on 12/20/20. Radiology:  No orders to display       ------------------------- NURSING NOTES AND VITALS REVIEWED ---------------------------  Date / Time Roomed:  12/20/2020  7:20 AM  ED Bed Assignment:  20/20    The nursing notes within the ED encounter and vital signs as below have been reviewed. /81   Pulse 98   Temp 97.3 °F (36.3 °C) (Temporal)   Resp 18   Ht 6' 1\" (1.854 m)   Wt 285 lb (129.3 kg)   SpO2 98%   BMI 37.60 kg/m²   Oxygen Saturation Interpretation: normal      ------------------------------------------ PROGRESS NOTES ------------------------------------------    I have spoken with the patient and discussed todays results, in addition to providing specific details for the plan of care and counseling regarding the diagnosis and prognosis.   Their questions are answered at this time and they are agreeable with the plan. I discussed at length with them reasons for immediate return here for re evaluation. They will followup with their PCP by calling their office tomorrow. --------------------------------- ADDITIONAL PROVIDER NOTES ---------------------------------  At this time the patient is without objective evidence of an acute process requiring hospitalization or inpatient management. They have remained hemodynamically stable throughout their entire ED visit and are stable for discharge with outpatient follow-up. The plan has been discussed in detail and they are aware of the specific conditions for emergent return, as well as the importance of follow-up. Discharge Medication List as of 12/20/2020  8:35 AM      START taking these medications    Details   sulfamethoxazole-trimethoprim (BACTRIM DS;SEPTRA DS) 800-160 MG per tablet Take 1 tablet by mouth 2 times daily for 7 days, Disp-14 tablet, R-0Print      mupirocin (BACTROBAN) 2 % cream Apply topically 3 times daily. , Disp-1 Tube, R-0, Print      HYDROcodone-acetaminophen (NORCO) 5-325 MG per tablet Take 1 tablet by mouth every 8 hours as needed for Pain for up to 3 days. Intended supply: 3 days. Take lowest dose possible to manage pain, Disp-9 tablet, R-0Print             Diagnosis:  1. Abscess        Disposition:  Patient's disposition: Discharge to home  Patient's condition is stable. Eliana Dumont MD  Resident  12/21/20 6851       Eliana Dumont MD  Resident  12/21/20 3440    ATTENDING PROVIDER ATTESTATION:     I have personally performed and/or participated in the history, exam, medical decision making, and procedures and agree with all pertinent clinical information unless otherwise noted. I have also reviewed and agree with the past medical, family and social history unless otherwise noted.     I have discussed this patient in detail with the resident and provided the instruction and education regarding the evidence-based evaluation and treatment of Other (Umbilical pain; Seen at Urgent Care 12/15/20; On Cephalexin)    My plan: Symptomatic and supportive care. Patient seen and examined. Patient has small umbilical abscess. Is felt this could be drained and a small incision was made in the area we did not get a great deal of purulent drainage out but it did get smaller. Patient did state some relief. He was discharged with additional Bactrim and bacitracin and was to continue Keflex. He was given reasons to return to the emergency department and felt safe for discharge at this time. He stated understanding and was agreeable with plan.     Electronically signed by Arelis Garica DO on 12/21/20 at 6:39 AM CELESTE Garcia DO  12/21/20 5805

## 2021-04-07 ENCOUNTER — TELEPHONE (OUTPATIENT)
Dept: NEUROSURGERY | Age: 51
End: 2021-04-07

## 2021-04-07 DIAGNOSIS — G89.29 CHRONIC LEFT-SIDED LOW BACK PAIN, UNSPECIFIED WHETHER SCIATICA PRESENT: ICD-10-CM

## 2021-04-07 DIAGNOSIS — M25.552 HIP PAIN, CHRONIC, LEFT: ICD-10-CM

## 2021-04-07 DIAGNOSIS — M54.50 CHRONIC LEFT-SIDED LOW BACK PAIN, UNSPECIFIED WHETHER SCIATICA PRESENT: ICD-10-CM

## 2021-04-07 DIAGNOSIS — G89.29 HIP PAIN, CHRONIC, LEFT: ICD-10-CM

## 2021-05-03 ENCOUNTER — OFFICE VISIT (OUTPATIENT)
Dept: NEUROSURGERY | Age: 51
End: 2021-05-03
Payer: COMMERCIAL

## 2021-05-03 VITALS
DIASTOLIC BLOOD PRESSURE: 71 MMHG | BODY MASS INDEX: 37.37 KG/M2 | HEART RATE: 68 BPM | WEIGHT: 282 LBS | HEIGHT: 73 IN | SYSTOLIC BLOOD PRESSURE: 124 MMHG

## 2021-05-03 DIAGNOSIS — M54.16 LUMBAR RADICULOPATHY: Primary | ICD-10-CM

## 2021-05-03 PROCEDURE — G8427 DOCREV CUR MEDS BY ELIG CLIN: HCPCS | Performed by: PHYSICIAN ASSISTANT

## 2021-05-03 PROCEDURE — 1036F TOBACCO NON-USER: CPT | Performed by: PHYSICIAN ASSISTANT

## 2021-05-03 PROCEDURE — G8417 CALC BMI ABV UP PARAM F/U: HCPCS | Performed by: PHYSICIAN ASSISTANT

## 2021-05-03 PROCEDURE — 99213 OFFICE O/P EST LOW 20 MIN: CPT | Performed by: PHYSICIAN ASSISTANT

## 2021-05-03 RX ORDER — CYCLOBENZAPRINE HCL 10 MG
10 TABLET ORAL 3 TIMES DAILY PRN
Qty: 30 TABLET | Refills: 0 | Status: SHIPPED | OUTPATIENT
Start: 2021-05-03 | End: 2021-05-13

## 2021-05-03 RX ORDER — GABAPENTIN 300 MG/1
300 CAPSULE ORAL 3 TIMES DAILY
Qty: 21 CAPSULE | Refills: 3 | Status: SHIPPED
Start: 2021-05-03 | End: 2021-06-24

## 2021-05-11 DIAGNOSIS — M48.061 SPINAL STENOSIS OF LUMBAR REGION WITHOUT NEUROGENIC CLAUDICATION: Primary | ICD-10-CM

## 2021-05-28 ENCOUNTER — PREP FOR PROCEDURE (OUTPATIENT)
Dept: PAIN MANAGEMENT | Age: 51
End: 2021-05-28

## 2021-05-28 ENCOUNTER — OFFICE VISIT (OUTPATIENT)
Dept: PAIN MANAGEMENT | Age: 51
End: 2021-05-28
Payer: COMMERCIAL

## 2021-05-28 VITALS
BODY MASS INDEX: 37.37 KG/M2 | WEIGHT: 282 LBS | HEIGHT: 73 IN | RESPIRATION RATE: 16 BRPM | SYSTOLIC BLOOD PRESSURE: 115 MMHG | OXYGEN SATURATION: 98 % | DIASTOLIC BLOOD PRESSURE: 68 MMHG | TEMPERATURE: 97.1 F | HEART RATE: 90 BPM

## 2021-05-28 DIAGNOSIS — M51.36 DDD (DEGENERATIVE DISC DISEASE), LUMBAR: ICD-10-CM

## 2021-05-28 DIAGNOSIS — M47.817 LUMBOSACRAL SPONDYLOSIS WITHOUT MYELOPATHY: Primary | ICD-10-CM

## 2021-05-28 DIAGNOSIS — M53.3 SACROILIAC DYSFUNCTION: ICD-10-CM

## 2021-05-28 DIAGNOSIS — E66.9 OBESITY, UNSPECIFIED CLASSIFICATION, UNSPECIFIED OBESITY TYPE, UNSPECIFIED WHETHER SERIOUS COMORBIDITY PRESENT: ICD-10-CM

## 2021-05-28 PROBLEM — M51.369 DDD (DEGENERATIVE DISC DISEASE), LUMBAR: Status: ACTIVE | Noted: 2021-05-28

## 2021-05-28 PROCEDURE — G8417 CALC BMI ABV UP PARAM F/U: HCPCS | Performed by: ANESTHESIOLOGY

## 2021-05-28 PROCEDURE — 99204 OFFICE O/P NEW MOD 45 MIN: CPT | Performed by: ANESTHESIOLOGY

## 2021-05-28 PROCEDURE — G8427 DOCREV CUR MEDS BY ELIG CLIN: HCPCS | Performed by: ANESTHESIOLOGY

## 2021-05-28 PROCEDURE — 1036F TOBACCO NON-USER: CPT | Performed by: ANESTHESIOLOGY

## 2021-05-28 NOTE — PROGRESS NOTES
[address]        [address] [facility], [address] [facility]      [phone]                Consult Note      Patient:  GET [place] 1970    Date of Service:  21     Requesting Physician:  POLLY Alexis    Reason for Consult:      Patient presents with complaints of low back pain    HISTORY OF PRESENT ILLNESS:      Mr. Hal Campos is a 52 y.o. male [facility] of  chronic low back pain. Started > a year ago after he felt a pop while he was stopping a trailer in 2019. Pain over the left low back and radiates to the left groin. Tried multiple modalities of treatment including -meds/ chiropractic treatment, PT. Has been evaluated by NSG and referred for interventions. Pain is constant and is described as aching and throbbing. He  does not have numbness, tingling, weakness of the lower extremities     Patient does not have bladder or bowel dysfunction. Alleviating factors include: rest.  Aggravating factors include: movement, walking, bending, lifting. Pain causes functional limitations/ limits Adl's : Yes    Nursing notes and details of the pain history reviewed. Please see intake notes for details. Previous treatments:   Physical Therapy / HEP: yes,      Chiropractic treatment: yes, for > 2 months recently    Medications: - NSAID's : yes            - Membrane stabilizers : yes- gabapentin            - Opioids : no            - Adjuvants or Others : yes    TENS Unit: no    Surgeries: no LS or hip surgery    Interventional Pain procedures/ nerve blocks: no    He has not been on anticoagulation medications     He has not been on herbal supplements. He is diabetic. 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 Imaging:   MRI of LS spine: 2021:         X- ray Pelvis: 7/16/2020:     FINDINGS:   No fracture or dislocation seen. Bone mineralization is normal for   age. There is joint space narrowing in the bilateral hips. Nonobstructed bowel gas pattern. Multiple pelvic phleboliths.           Impression   Moderate degenerative changes in the bilateral hips.             Past Medical History:   Diagnosis Date    Acid reflux     DM (diabetes mellitus) (HCC)     Low back pain     Protein in urine        Past Surgical History:   Procedure Laterality Date    PATELLA SURGERY Right     UPPER GASTROINTESTINAL ENDOSCOPY  10/2020       Prior to Admission medications    Medication Sig Start Date End Date Taking? Authorizing Provider   TRULICITY 1.5 NOAH/4.0SW SOPN  10/16/20  Yes Historical Provider, MD   pioglitazone (ACTOS) 15 MG tablet take 1 tablet by mouth once daily 10/14/20  Yes Historical Provider, MD   pantoprazole (PROTONIX) 40 MG tablet pantoprazole 40 mg tablet,delayed release   Yes Historical Provider, MD   ALPRAZolam (XANAX) 0.5 MG tablet alprazolam 0.5 mg tablet   take 1 tablet by mouth once daily   Yes Historical Provider, MD   methylphenidate (CONCERTA) 27 MG extended release tablet Concerta 27 mg tablet,extended release   take 1 tablet by mouth once daily   Yes Historical Provider, MD   glimepiride (AMARYL) 4 MG tablet Take 4 mg by mouth 2 times daily   Yes Historical Provider, MD   gabapentin (NEURONTIN) 300 MG capsule Take 1 capsule by mouth 3 times daily for 7 days.  5/3/21 5/10/21  POLLY Gasca       No Known Allergies    Social History     Socioeconomic History    Marital status:      Spouse name: Not on file    Number of children: Not on file    Years of education: Not on file    Highest education level: Not on file   Occupational History    Not on file   Tobacco Use    Smoking status: Never Smoker    Smokeless tobacco: Never Used   Vaping Use    Vaping Use: Never used   Substance and Sexual Activity    Alcohol use: Yes     Comment: Socially    Drug use: Never    Sexual activity: Yes     Partners: Female   Other Topics Concern    Not on file   Social History Narrative    Not on file     Social Determinants of Health     Financial Resource Strain:     Difficulty of Paying Living Expenses:    Food Insecurity:     Worried About Running Out of Food in the Last Year:     [address] in the Last Year:    Transportation Needs:     Lack of Transportation (Medical):  Lack of Transportation (Non-Medical):    Physical Activity:     Days of Exercise per Week:     Minutes of Exercise per Session:    Stress:     Feeling of Stress :    Social Connections:     Frequency of Communication with Friends and Family:     Frequency of Social Gatherings with Friends and Family:     Attends [employer] Services:     Active Member of Clubs or Organizations:     Attends Club or Organization Meetings:     Marital Status:    Intimate Partner Violence:     Fear of Current or Ex-Partner:     Emotionally Abused:     Physically Abused:     Sexually Abused:        Family History   Adopted: Yes       REVIEW OF SYSTEMS:     Patient specifically denies fever/chills, chest pain, shortness of breath, new bowel or bladder complaints. All other review of systems was negative.     Review of Systems - Documented reviewed    PHYSICAL EXAMINATION:      /68   Pulse 90   Temp 97.1 °F (36.2 °C) (Infrared)   Resp 16   Ht 6' 1\" (1.854 m)   Wt 282 lb (127.9 kg)   SpO2 98%   BMI 37.21 kg/m²     General:      General appearance:  Pleasant and well-hydrated, in no distress and A & O x 3  Build:Obese  Function: Rises from seated position easily and Moves about room without difficulty    HEENT:    Head:normocephalic, atraumatic  Pupils:regular, round, equal  Sclera: icterus absent    Lungs:    Breathing:normal breathing pattern     CVS:     RRR    Abdomen:    Shape:obese, non-distended and normal  Tenderness:none  Guarding:none    Cervical predominantly axial in nature. Left lumbar facet loading +    Failed > 6 weeks of conservative treatment recently. MRI: of LS spine reviewed. Mild DDD    Has been evaluated by NSG    Plan:  Left lumbar facet MBNB to address the pain for left facet joints L3-4, L4-5, L5-S1. RBA discussed. If short term relief, will do RFA. If no relief, consider AMRIK    Also has SIJ tenderness- but facet pain seems to be predominant. Will address this in future if SIJ pain persists. He is on Cyclobenzaprine. He is already on Gabapentin low dose. ZT lido patch. Samples and script given. Urine screen today: no    Counseling :    Patient encouraged to stay active and to watch/lose weight. Encouraged to continue Regular home exercise program as tolerated - stretching / strengthening. Treatment plan discussed with the patient including medication and procedure side effects. Controlled Substances Monitoring:     OARRS reviewed- not on chronic opioids. Radha Isaac MD    CC:    Claire Conley, [address] [facility].   [facility],  Formerly named [facility] [address] [address]

## 2021-05-28 NOTE — PROGRESS NOTES
Patient:  GET [place] 1970  Date of Service:  21      Do you currently have any of the following:    Fever: No  Headache:  No  Cough: No  Shortness of breath: No  Exposed to anyone with these symptoms: No       Patient presents with complaints of lower back, left hip pain that started 2 years ago and has been getting worse. He states the pain began following Trauma    Pain is constant and is described as aching, throbbing, shooting, stabbing, sharp, burning, exhausting and penetrating. He rates the pain as a 10/10 on his worst day , 7/10 on his best day, and a 9/10 on average on the VAS scale. Pain does radiate to left leg, left hip and groin. He  has feelings that it is out of socket of the left leg, hip and groin. .    Alleviating factors include: rest.  Aggravating factors include:  movement, walking, standing, bending, lying down, lifting. He states that the pain does keep him from sleeping at night. He took his last dose of Neurontin but only takes at [facility] because he is too tired with it. Rell Stallworth He is not on NSAIDS and  is not on anticoagulation medications to include none and is managed by Prem Gunderson MD  .     Previous treatments: Physical Therapy and medications. .      Personal Expectations from this treatment: increase activity and decrease pain    /68   Pulse 90   Temp 97.1 °F (36.2 °C) (Infrared)   Resp 16   Ht 6' 1\" (1.854 m)   Wt 282 lb (127.9 kg)   SpO2 98%   BMI 37.21 kg/m²     No LMP for male patient.

## 2021-06-01 PROBLEM — E11.9 TYPE 2 DIABETES MELLITUS WITHOUT COMPLICATION (HCC): Status: ACTIVE | Noted: 2020-04-24

## 2021-06-01 PROBLEM — F39 MOOD DISORDER (HCC): Status: ACTIVE | Noted: 2018-08-14

## 2021-06-01 PROBLEM — M54.12 C7 RADICULOPATHY: Status: ACTIVE | Noted: 2018-08-14

## 2021-06-01 PROBLEM — M25.50 MULTIPLE JOINT PAIN: Status: ACTIVE | Noted: 2018-09-04

## 2021-06-01 PROBLEM — M25.422 PAIN AND SWELLING OF LEFT ELBOW: Status: ACTIVE | Noted: 2018-08-14

## 2021-06-01 PROBLEM — M54.2 NECK PAIN ON LEFT SIDE: Status: ACTIVE | Noted: 2018-09-04

## 2021-06-01 PROBLEM — M25.522 PAIN AND SWELLING OF LEFT ELBOW: Status: ACTIVE | Noted: 2018-08-14

## 2021-06-01 PROBLEM — M79.632 PAIN IN LEFT FOREARM: Status: ACTIVE | Noted: 2018-08-14

## 2021-06-01 RX ORDER — CYCLOBENZAPRINE HCL 5 MG
5 TABLET ORAL 3 TIMES DAILY PRN
COMMUNITY
End: 2021-08-17 | Stop reason: ALTCHOICE

## 2021-06-04 ENCOUNTER — ANESTHESIA EVENT (OUTPATIENT)
Dept: OPERATING ROOM | Age: 51
End: 2021-06-04
Payer: COMMERCIAL

## 2021-06-07 ASSESSMENT — LIFESTYLE VARIABLES: SMOKING_STATUS: 0

## 2021-06-07 NOTE — ANESTHESIA PRE PROCEDURE
Department of Anesthesiology  Preprocedure Note       Name:  Kee Fuller   Age:  52 y.o.  :  1970                                          MRN:  [id number]         Date:  2021      Surgeon: Wendy Waters):  Elin Noel MD    Procedure: Procedure(s):  LEFT LUMBAR MEDIAL BRANCH NERVE BLOCK UNDER FLUOROSCOPIC GUIDANCE AT L2, L3, L4 AND L5 DORSAL RAMI WITH SEDATION (CPT 36678)    Medications prior to admission:   Prior to Admission medications    Medication Sig Start Date End Date Taking? Authorizing Provider   cyclobenzaprine (FLEXERIL) 5 MG tablet Take 5 mg by mouth 3 times daily as needed for Muscle spasms   Yes Historical Provider, MD   gabapentin (NEURONTIN) 300 MG capsule Take 1 capsule by mouth 3 times daily for 7 days. Patient taking differently: Take 300 mg by mouth as needed. 5/3/21 6/1/21 Yes POLLY Santamaria   TRULICITY 1.5 RS/4.8WN [facility]  10/16/20   Historical Provider, MD   pioglitazone (ACTOS) 15 MG tablet take 1 tablet by mouth once daily 10/14/20   Historical Provider, MD   pantoprazole (PROTONIX) 40 MG tablet pantoprazole 40 mg tablet,delayed release    Historical Provider, MD   ALPRAZolam Humberto James) 0.5 MG tablet as needed. Historical Provider, MD   methylphenidate (CONCERTA) 27 MG extended release tablet as needed. Historical Provider, MD   glimepiride (AMARYL) 4 MG tablet Take 4 mg by mouth 2 times daily    Historical Provider, MD       Current medications:    No current facility-administered medications for this encounter. Current Outpatient Medications   Medication Sig Dispense Refill    cyclobenzaprine (FLEXERIL) 5 MG tablet Take 5 mg by mouth 3 times daily as needed for Muscle spasms      gabapentin (NEURONTIN) 300 MG capsule Take 1 capsule by mouth 3 times daily for 7 days.  (Patient taking differently: Take 300 mg by mouth as needed. ) 21 capsule 3    TRULICITY 1.5 ZR/1.9ZI SOPN       pioglitazone (ACTOS) 15 MG tablet take 1 tablet by mouth once daily      pantoprazole (PROTONIX) 40 MG tablet pantoprazole 40 mg tablet,delayed release      ALPRAZolam (XANAX) 0.5 MG tablet as needed.  methylphenidate (CONCERTA) 27 MG extended release tablet as needed.  glimepiride (AMARYL) 4 MG tablet Take 4 mg by mouth 2 times daily         Allergies:  No Known Allergies    Problem List:    Patient Active Problem List   Diagnosis Code    Lumbosacral spondylosis without myelopathy M47.817    DDD (degenerative disc disease), lumbar M51.36    C7 radiculopathy M54.12    Mood disorder ([facility] [address] F39    Multiple joint pain M25.50    Neck pain on left side M54.2    Pain and swelling of left elbow M25.522, M25.422    Pain in left forearm M79.632    Type 2 diabetes mellitus without complication ([facility] [address] V33.6       Past Medical History:        Diagnosis Date    Acid reflux     DM (diabetes mellitus) ([facility] [address]     Low back pain     Protein in urine        Past Surgical History:        Procedure Laterality Date    ENDOSCOPY, COLON, DIAGNOSTIC      PATELLA SURGERY Right     UPPER GASTROINTESTINAL ENDOSCOPY  10/2020       Social History:    Social History     Tobacco Use    Smoking status: Never Smoker    Smokeless tobacco: Never Used   Substance Use Topics    Alcohol use: Yes     Comment: Socially                                Counseling given: Not Answered      Vital Signs (Current):   Vitals:    06/01/21 1356   Weight: 282 lb (127.9 kg)   Height: 6' 1\" (1.854 m)                                              BP Readings from Last 3 Encounters:   05/28/21 115/68   05/03/21 124/71   12/20/20 134/81       NPO Status:  >8.H                                                                               BMI:   Wt Readings from Last 3 Encounters:   05/28/21 282 lb (127.9 kg)   05/03/21 282 lb (127.9 kg)   12/20/20 285 lb (129.3 kg)     Body mass index is 37.21 kg/m².     CBC:   Lab Results   Component Value Date    WBC 11.4 06/05/2018    RBC 5.42 06/05/2018    HGB 16.6 06/05/2018

## 2021-06-08 ENCOUNTER — HOSPITAL ENCOUNTER (OUTPATIENT)
Dept: OPERATING ROOM | Age: 51
Setting detail: OUTPATIENT SURGERY
Discharge: HOME | End: 2021-06-08
Attending: ANESTHESIOLOGY
Payer: COMMERCIAL

## 2021-06-08 ENCOUNTER — ANESTHESIA (OUTPATIENT)
Dept: OPERATING ROOM | Age: 51
End: 2021-06-08
Payer: COMMERCIAL

## 2021-06-08 ENCOUNTER — HOSPITAL ENCOUNTER (OUTPATIENT)
Age: 51
Setting detail: OUTPATIENT SURGERY
Discharge: HOME | End: 2021-06-08
Attending: ANESTHESIOLOGY | Admitting: ANESTHESIOLOGY
Payer: COMMERCIAL

## 2021-06-08 VITALS
HEIGHT: 73 IN | OXYGEN SATURATION: 97 % | SYSTOLIC BLOOD PRESSURE: 121 MMHG | HEART RATE: 78 BPM | RESPIRATION RATE: 14 BRPM | BODY MASS INDEX: 38.17 KG/M2 | DIASTOLIC BLOOD PRESSURE: 77 MMHG | WEIGHT: 288 LBS | TEMPERATURE: 98 F

## 2021-06-08 VITALS
RESPIRATION RATE: 16 BRPM | OXYGEN SATURATION: 97 % | DIASTOLIC BLOOD PRESSURE: 90 MMHG | SYSTOLIC BLOOD PRESSURE: 131 MMHG

## 2021-06-08 DIAGNOSIS — M47.896 OTHER OSTEOARTHRITIS OF SPINE, LUMBAR REGION: ICD-10-CM

## 2021-06-08 LAB — METER GLUCOSE: 239 MG/DL (ref 74–99)

## 2021-06-08 PROCEDURE — 2580000003 HC RX 258: Performed by: ANESTHESIOLOGY

## 2021-06-08 PROCEDURE — 3209999900 FLUORO FOR SURGICAL PROCEDURES

## 2021-06-08 PROCEDURE — 64493 INJ PARAVERT F JNT L/S 1 LEV: CPT | Performed by: ANESTHESIOLOGY

## 2021-06-08 PROCEDURE — 64494 INJ PARAVERT F JNT L/S 2 LEV: CPT | Performed by: ANESTHESIOLOGY

## 2021-06-08 PROCEDURE — 7100000010 HC PHASE II RECOVERY - FIRST 15 MIN: Performed by: ANESTHESIOLOGY

## 2021-06-08 PROCEDURE — 3700000000 HC ANESTHESIA ATTENDED CARE: Performed by: ANESTHESIOLOGY

## 2021-06-08 PROCEDURE — 6360000002 HC RX W HCPCS: Performed by: NURSE ANESTHETIST, CERTIFIED REGISTERED

## 2021-06-08 PROCEDURE — 2709999900 HC NON-CHARGEABLE SUPPLY: Performed by: ANESTHESIOLOGY

## 2021-06-08 PROCEDURE — 3600000005 HC SURGERY LEVEL 5 BASE: Performed by: ANESTHESIOLOGY

## 2021-06-08 PROCEDURE — 64495 INJ PARAVERT F JNT L/S 3 LEV: CPT | Performed by: ANESTHESIOLOGY

## 2021-06-08 PROCEDURE — 2500000003 HC RX 250 WO HCPCS: Performed by: ANESTHESIOLOGY

## 2021-06-08 PROCEDURE — 82962 GLUCOSE BLOOD TEST: CPT

## 2021-06-08 PROCEDURE — 6360000002 HC RX W HCPCS: Performed by: ANESTHESIOLOGY

## 2021-06-08 PROCEDURE — 82962 GLUCOSE BLOOD TEST: CPT | Performed by: ANESTHESIOLOGY

## 2021-06-08 PROCEDURE — 3600000015 HC SURGERY LEVEL 5 ADDTL 15MIN: Performed by: ANESTHESIOLOGY

## 2021-06-08 PROCEDURE — 7100000011 HC PHASE II RECOVERY - ADDTL 15 MIN: Performed by: ANESTHESIOLOGY

## 2021-06-08 PROCEDURE — 3700000001 HC ADD 15 MINUTES (ANESTHESIA): Performed by: ANESTHESIOLOGY

## 2021-06-08 RX ORDER — SODIUM CHLORIDE, SODIUM LACTATE, POTASSIUM CHLORIDE, CALCIUM CHLORIDE 600; 310; 30; 20 MG/100ML; MG/100ML; MG/100ML; MG/100ML
INJECTION, SOLUTION INTRAVENOUS CONTINUOUS
Status: DISCONTINUED | OUTPATIENT
Start: 2021-06-08 | End: 2021-06-08 | Stop reason: HOSPADM

## 2021-06-08 RX ORDER — BUPIVACAINE HYDROCHLORIDE 5 MG/ML
INJECTION, SOLUTION PERINEURAL PRN
Status: DISCONTINUED | OUTPATIENT
Start: 2021-06-08 | End: 2021-06-08 | Stop reason: ALTCHOICE

## 2021-06-08 RX ORDER — HYDRALAZINE HYDROCHLORIDE 20 MG/ML
5 INJECTION INTRAMUSCULAR; INTRAVENOUS EVERY 10 MIN PRN
Status: DISCONTINUED | OUTPATIENT
Start: 2021-06-08 | End: 2021-06-08 | Stop reason: HOSPADM

## 2021-06-08 RX ORDER — LABETALOL HYDROCHLORIDE 5 MG/ML
5 INJECTION, SOLUTION INTRAVENOUS EVERY 10 MIN PRN
Status: DISCONTINUED | OUTPATIENT
Start: 2021-06-08 | End: 2021-06-08 | Stop reason: HOSPADM

## 2021-06-08 RX ORDER — LIDOCAINE HYDROCHLORIDE 5 MG/ML
INJECTION, SOLUTION INFILTRATION; INTRAVENOUS PRN
Status: DISCONTINUED | OUTPATIENT
Start: 2021-06-08 | End: 2021-06-08 | Stop reason: ALTCHOICE

## 2021-06-08 RX ORDER — METHYLPREDNISOLONE ACETATE 40 MG/ML
INJECTION, SUSPENSION INTRA-ARTICULAR; INTRALESIONAL; INTRAMUSCULAR; SOFT TISSUE PRN
Status: DISCONTINUED | OUTPATIENT
Start: 2021-06-08 | End: 2021-06-08 | Stop reason: ALTCHOICE

## 2021-06-08 RX ORDER — FENTANYL CITRATE 50 UG/ML
INJECTION, SOLUTION INTRAMUSCULAR; INTRAVENOUS PRN
Status: DISCONTINUED | OUTPATIENT
Start: 2021-06-08 | End: 2021-06-08 | Stop reason: SDUPTHER

## 2021-06-08 RX ORDER — MIDAZOLAM HYDROCHLORIDE 1 MG/ML
INJECTION INTRAMUSCULAR; INTRAVENOUS PRN
Status: DISCONTINUED | OUTPATIENT
Start: 2021-06-08 | End: 2021-06-08 | Stop reason: SDUPTHER

## 2021-06-08 RX ORDER — PROMETHAZINE HYDROCHLORIDE 25 MG/ML
25 INJECTION, SOLUTION INTRAMUSCULAR; INTRAVENOUS
Status: DISCONTINUED | OUTPATIENT
Start: 2021-06-08 | End: 2021-06-08 | Stop reason: HOSPADM

## 2021-06-08 RX ORDER — DIPHENHYDRAMINE HYDROCHLORIDE 50 MG/ML
12.5 INJECTION INTRAMUSCULAR; INTRAVENOUS
Status: DISCONTINUED | OUTPATIENT
Start: 2021-06-08 | End: 2021-06-08 | Stop reason: HOSPADM

## 2021-06-08 RX ORDER — MEPERIDINE HYDROCHLORIDE 25 MG/ML
12.5 INJECTION INTRAMUSCULAR; INTRAVENOUS; SUBCUTANEOUS EVERY 5 MIN PRN
Status: DISCONTINUED | OUTPATIENT
Start: 2021-06-08 | End: 2021-06-08 | Stop reason: HOSPADM

## 2021-06-08 RX ADMIN — FENTANYL CITRATE 100 MCG: 50 INJECTION, SOLUTION INTRAMUSCULAR; INTRAVENOUS at 10:12

## 2021-06-08 RX ADMIN — MIDAZOLAM 2 MG: 1 INJECTION INTRAMUSCULAR; INTRAVENOUS at 10:10

## 2021-06-08 RX ADMIN — SODIUM CHLORIDE, POTASSIUM CHLORIDE, SODIUM LACTATE AND CALCIUM CHLORIDE: 600; 310; 30; 20 INJECTION, SOLUTION INTRAVENOUS at 09:03

## 2021-06-08 ASSESSMENT — PAIN - FUNCTIONAL ASSESSMENT: PAIN_FUNCTIONAL_ASSESSMENT: 0-10

## 2021-06-08 ASSESSMENT — PAIN DESCRIPTION - DESCRIPTORS: DESCRIPTORS: ACHING

## 2021-06-08 ASSESSMENT — PAIN SCALES - GENERAL
PAINLEVEL_OUTOF10: 0

## 2021-06-08 NOTE — ANESTHESIA POSTPROCEDURE EVALUATION
Department of Anesthesiology  Postprocedure Note    Patient: Kiki Min  MRN: [id number]  YOB: 1970  Date of evaluation: 6/8/2021  Time:  11:19 AM     Procedure Summary     Date: 06/08/21 Room / Location: [address] [place] [address] 04 / [facility]    Anesthesia Start: 1010 Anesthesia Stop: 1024    Procedure: LEFT LUMBAR MEDIAL BRANCH NERVE BLOCK UNDER FLUOROSCOPIC GUIDANCE AT L2, L3, L4 AND L5 DORSAL RAMI WITH SEDATION (CPT 82207) (Left ) Diagnosis: (LUMBOSACRAL SPONDYLOSIS)    Surgeons: Ai Peters MD Responsible Provider: Eddie Deshpande MD    Anesthesia Type: MAC ASA Status: 2          Anesthesia Type: MAC    Reyna Phase I: Reyna Score: 10    Reyna Phase II: Reyna Score: 10    Last vitals: Reviewed and per EMR flowsheets.        Anesthesia Post Evaluation    Patient location during evaluation: PACU  Patient participation: complete - patient participated  Level of consciousness: awake and alert  Airway patency: patent  Nausea & Vomiting: no nausea and no vomiting  Complications: no  Cardiovascular status: hemodynamically stable  Respiratory status: room air and spontaneous ventilation  Hydration status: stable

## 2021-06-08 NOTE — PROGRESS NOTES
Discharge instructions reviewed with patient and family verbalized understanding VSS work excuse given discharged home with family.

## 2021-06-08 NOTE — OP NOTE
observed throughout the procedure. The area of the lumbar spine was prepped with chloraprep and draped in a sterile manner. AP fluoroscopy was used to identify and michelle bartons point at the targeted levels. The skin and subcutaneous tissues in these identified areas were anesthetized with 0.5%Lidocaine. The 22 # gauge 5 inch spinal needle was advanced toward the junction of the superior articular process and the transverse process, along the course of the medial branch. Satisfactory needle placement was confirmed by AP and oblique projections. At the sacral alar level, the sacral alar region was visualized and the needle tip was positioned on the sacral alar at the base of the superior articulating process where the medial branch traverses under fluoroscopic guidance. Once bone was contacted and negative aspiration was confirmed. A solution of 0.5% marcaine with 5 mg DepoMedrol 1 cc was then injected at each level. Following the procedure Genaro noted improvement of previous pain symptoms. Disposition the patient tolerated the procedure well and there were no complications . Vital signs remained stable throughout the procedure. The patient was escorted to the recovery area where they remained until discharge and written discharge instructions for the procedure were given. Plan: Gaurav Meza will return to our pain management center as scheduled. He will observe for the response from today's procedure and follow up as instructed.     Micheline Kawasaki, MD

## 2021-06-08 NOTE — LETTER
[address] [facility]  [place]  Phone: [phone]             June 8, 2021    Patient: Kee Fuller   YOB: 1970   Date of Visit: 6/8/2021       To Whom It May Concern:    Kee Fuller was seen and treated in our facility 6/8/2021. He may return to work tomorrow 6/9/2021.        Sincerely,       Adia Saldivar RN         Signature:__________________________________

## 2021-06-08 NOTE — H&P
Update History & Physical    The patient's History and Physical of May 28, 2021 was reviewed with the patient and I examined the patient. There was no change. The surgical site was confirmed by the patient and me. Plan: The risks, benefits, expected outcome, and alternative to the recommended procedure have been discussed with the patient. Patient understands and wants to proceed with the procedure. The patient was counseled at length about the risks of suri Covid-19 during their perioperative period and any recovery window from their procedure. The patient was made aware that suri Covid-19  may worsen their prognosis for recovering from their procedure  and lend to a higher morbidity and/or mortality risk. All material risks, benefits, and reasonable alternatives including postponing the procedure were discussed. The patient does wish to proceed with the procedure at this time.     Electronically signed by Anali Cano MD

## 2021-06-24 ENCOUNTER — OFFICE VISIT (OUTPATIENT)
Dept: PAIN MANAGEMENT | Age: 51
End: 2021-06-24
Payer: COMMERCIAL

## 2021-06-24 ENCOUNTER — PREP FOR PROCEDURE (OUTPATIENT)
Dept: PAIN MANAGEMENT | Age: 51
End: 2021-06-24

## 2021-06-24 VITALS
HEART RATE: 85 BPM | WEIGHT: 288 LBS | HEIGHT: 73 IN | OXYGEN SATURATION: 97 % | BODY MASS INDEX: 38.17 KG/M2 | RESPIRATION RATE: 16 BRPM | DIASTOLIC BLOOD PRESSURE: 80 MMHG | SYSTOLIC BLOOD PRESSURE: 121 MMHG | TEMPERATURE: 97.9 F

## 2021-06-24 DIAGNOSIS — M51.36 DDD (DEGENERATIVE DISC DISEASE), LUMBAR: Primary | ICD-10-CM

## 2021-06-24 DIAGNOSIS — E66.9 OBESITY, UNSPECIFIED CLASSIFICATION, UNSPECIFIED OBESITY TYPE, UNSPECIFIED WHETHER SERIOUS COMORBIDITY PRESENT: ICD-10-CM

## 2021-06-24 DIAGNOSIS — M54.12 C7 RADICULOPATHY: ICD-10-CM

## 2021-06-24 DIAGNOSIS — M53.3 SACROILIAC DYSFUNCTION: ICD-10-CM

## 2021-06-24 DIAGNOSIS — M47.817 LUMBOSACRAL SPONDYLOSIS WITHOUT MYELOPATHY: ICD-10-CM

## 2021-06-24 PROCEDURE — 1036F TOBACCO NON-USER: CPT | Performed by: ANESTHESIOLOGY

## 2021-06-24 PROCEDURE — 99214 OFFICE O/P EST MOD 30 MIN: CPT | Performed by: ANESTHESIOLOGY

## 2021-06-24 PROCEDURE — G8427 DOCREV CUR MEDS BY ELIG CLIN: HCPCS | Performed by: ANESTHESIOLOGY

## 2021-06-24 PROCEDURE — G8417 CALC BMI ABV UP PARAM F/U: HCPCS | Performed by: ANESTHESIOLOGY

## 2021-06-24 PROCEDURE — 99213 OFFICE O/P EST LOW 20 MIN: CPT | Performed by: ANESTHESIOLOGY

## 2021-06-24 RX ORDER — INSULIN GLARGINE 100 [IU]/ML
32 INJECTION, SOLUTION SUBCUTANEOUS NIGHTLY
COMMUNITY
Start: 2021-06-07

## 2021-06-24 NOTE — PROGRESS NOTES
[facility] Pain Management  [place], [address]  Dept: [phone]    Follow up Note      Hal Campos     Date of Visit:  6/24/2021    CC:  Patient presents for follow up   Chief Complaint   Patient presents with    Follow-up    Follow Up After Procedure     LEFT LUMBAR MEDIAL BRANCH NERVE BLOCK UNDER FLUOROSCOPIC GUIDANCE AT L2, L3, L4 AND L5 DORSAL RAMI WITH SEDATION     Lower Back Pain    Hip Pain     left hip     HPI:  chronic low back pain.     Started > a year ago after he felt a pop while he was stopping a trailer in Nov 2019.     Pain over the left low back and radiates to the left groin.     Tried multiple modalities of treatment including -meds/ chiropractic treatment, PT.     Has been evaluated by NSG and referred for interventions. S/P Left lumbar MBNB- no significant pain relief. Nursing notes and details of the pain history reviewed. Please see intake notes for details.     Previous treatments:   Physical Therapy / HEP: yes,       Chiropractic treatment: yes, for > 2 months recently     Medications: - NSAID's : yes                       - Membrane stabilizers : yes- gabapentin                       - Opioids : no                       - Adjuvants or Others : yes     TENS Unit: no     Surgeries: no LS or hip surgery     Interventional Pain procedures/ nerve blocks: no     He has not been on anticoagulation medications      He has not been on herbal supplements.       He is diabetic.     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     Imaging:   MRI of LS spine: 4/6/2021:         X- ray Pelvis: 7/16/2020:      FINDINGS:   No fracture or dislocation seen. Bone mineralization is normal for   age. There is joint space narrowing in the bilateral hips. Nonobstructed bowel gas pattern.  Multiple pelvic phleboliths.           Impression   Moderate degenerative changes in the bilateral hips.              Potential Aberrant Drug-Related Behavior: no     Urine Drug Screening: no    OARRS report[de-identified]  reviewed 6/24/2021    Past Medical History:   Diagnosis Date    Acid reflux     DM (diabetes mellitus) (HCC)     Low back pain     Protein in urine        Past Surgical History:   Procedure Laterality Date    ENDOSCOPY, COLON, DIAGNOSTIC      NERVE BLOCK Left 6/8/2021    LEFT LUMBAR MEDIAL BRANCH NERVE BLOCK UNDER FLUOROSCOPIC GUIDANCE AT L2, L3, L4 AND L5 DORSAL RAMI WITH SEDATION (CPT 40328) performed by Kirsty Price MD at [address]     UPPER GASTROINTESTINAL ENDOSCOPY  10/2020       Prior to Admission medications    Medication Sig Start Date End Date Taking? Authorizing Provider   LANTUS SOLOSTAR 100 UNIT/ML injection pen TAKE 20 UNITS AT BEDTIME THEN INCREASE BY 2 UNITS EVERY NIGHT FOR. ..  (REFER TO PRESCRIPTION NOTES). 6/7/21  Yes Historical Provider, MD   cyclobenzaprine (FLEXERIL) 5 MG tablet Take 5 mg by mouth 3 times daily as needed for Muscle spasms   Yes Historical Provider, MD   gabapentin (NEURONTIN) 300 MG capsule Take 1 capsule by mouth 3 times daily for 7 days. Patient taking differently: Take 300 mg by mouth as needed. 5/3/21 6/24/21 Yes POLLY Calvo   TRULICITY 1.5 QB/7.2ER SOPN  10/16/20  Yes Historical Provider, MD   pioglitazone (ACTOS) 15 MG tablet take 1 tablet by mouth once daily 10/14/20  Yes Historical Provider, MD   pantoprazole (PROTONIX) 40 MG tablet pantoprazole 40 mg tablet,delayed release   Yes Historical Provider, MD   ALPRAZolam Ava Peterson) 0.5 MG tablet as needed. Yes Historical Provider, MD   methylphenidate (CONCERTA) 27 MG extended release tablet as needed.     Yes Historical Provider, MD   glimepiride (AMARYL) 4 MG tablet Take 4 mg by mouth 2 times daily   Yes Historical Provider, MD       No Known Allergies    Social History     Socioeconomic History    Marital status:      Spouse name: Not on file    Number of difficulty     HEENT:     Head:normocephalic, atraumatic       Lungs:     Breathing:normal breathing pattern      CVS:     RRR     Abdomen:     Shape:obese, non-distended and normal       Cervical spine:     Inspection:normal     Thoracic spine:                Spine inspection:normal      Lumbar spine:     Spine inspection: Normal   Palpation: Tenderness paravertebral muscles Yes left  Range of motion: Decreased, flexion Decreased, Lateral bending, extension and rotation bilaterally reduced is painful. Lumbar facet loading + left  Sacroiliac joint tenderness Yes left  Gaenslen's + left, Nella's + left  Piriformis tenderness: negative bilaterally  SLR : negative bilaterally  Trochanteric bursa tenderness: positive left  CVA tenderness:No      Musculoskeletal:     Trigger points no     Extremities:     Tremors:None bilaterally upper and lower  Edema:none x all 4 extremities     Neurological:     Sensory: Normal to light touch      Motor:   Right  5/5              Left  5/5               Right Bicep 5/5           Left Bicep 5/5              Right Triceps 5/5       Left Triceps 5/5          Right Deltoid 5/5     Left Deltoid 5/5                  Right Quadriceps 5/5          Left Quadriceps 5/5           Right Gastrocnemius 5/5    Left Gastrocnemius 5/5  Right Ant Tibialis 5/5  Left Ant Tibialis 5/5     Reflexes:    B/l equal reduced     Gait:normal Yes     Dermatology:     Skin:no rashes or lesions noted    Assessment/Plan:     Diagnosis Orders   1. Lumbosacral spondylosis without myelopathy      2. DDD (degenerative disc disease), lumbar      3. Sacroiliac dysfunction      4.  Obesity, unspecified classification, unspecified obesity type, unspecified whether serious comorbidity present            52 y.o. male with h/o low back pain for > a year following an injury.     Pain over the left low back - predominantly axial in nature.     S/P Lumbar facet MBNB- not much relief.     Failed > 6 weeks of conservative treatment recently.     MRI: of LS spine reviewed. He had CD reviewed personally.     Has been evaluated by NSG and referred for interventions. Diagnostic lumbar facet medial branch block did not provide significant pain relief.     Plan:  LESI L4-5 left paramedian approach under fluoroscopy. Will add left SIJ injection under fluoroscopy.     Gabapentin - did not help can cause drowsiness. D/ C gabapentin. Muscle relaxant for prn us.     ZT lido patch. Samples and script given. Script given.   If issues with her coverage for ZT lido, consider local compound cream.      Counseling :Patient encouraged to stay active and to watch/lose weight and to do Regular home exercise program as tolerated - stretching / strengthening.     Treatment plan discussed with the patient including medication and procedure side effects.     Controlled Substances Monitoring:      OARRS reviewed- not on chronic opioids.      Jalil Fernandez MD    CC:  Althea Lezama MD

## 2021-06-24 NOTE — PROGRESS NOTES
Do you currently have any of the following:    Fever: No  Headache:  No  Cough: No  Shortness of breath: No  Exposed to anyone with these symptoms: No         Hal Campos presents to the [address] [place] [address] on 6/24/2021. Miguelito Salgado is complaining of pain lower back and left hip. The pain is constant. The pain is described as aching, throbbing and shooting. Pain is rated on his best day at a 6, on his worst day at a 10, and on average at a 8 on the VAS scale. He took his last dose of Neurontin and Flexeril Sunday 6/20. Any procedures since your last visit: Yes, with 0 % relief. Pacemaker or defibrillator: No managed by . He is not on NSAIDS and is not on anticoagulation medications to include none and is managed by . Medication Contract and Consent for Opioid Use Documents Filed      No documents found                /80   Pulse 85   Temp 97.9 °F (36.6 °C) (Infrared)   Resp 16   Ht 6' 1\" (1.854 m)   Wt 288 lb (130.6 kg)   SpO2 97%   BMI 38.00 kg/m²      No LMP for male patient.

## 2021-06-28 ENCOUNTER — TELEPHONE (OUTPATIENT)
Dept: PAIN MANAGEMENT | Age: 51
End: 2021-06-28

## 2021-06-29 NOTE — PROGRESS NOTES
[facility] PRE-ADMISSION TESTING INSTRUCTIONS    The Preadmission Testing patient is instructed accordingly using the following criteria (check applicable):    ARRIVAL INSTRUCTIONS:  [x] Parking the day of Surgery is located in the Main Entrance lot. Upon entering the door, make an immediate right to the surgery reception desk    [x] Bring photo ID and insurance card    [] Bring in a copy of Living will or Durable Power of  papers. [x] Please be sure to arrange for responsible adult to provide transportation to and from the hospital    [x] Please arrange for responsible adult to be with you for the 24 hour period post procedure due to having anesthesia      GENERAL INSTRUCTIONS:    [x] Nothing by mouth after midnight, including gum, candy, mints or water    [x] You may brush your teeth, but do not swallow any water    [x] Take medications as instructed with 1-2 oz of water    [] Stop herbal supplements and vitamins 5 days prior to procedure    [] Follow preop dosing of blood thinners per physician instructions    [x] Take 1/2 dose of evening insulin, but no insulin after midnight    [x] No oral diabetic medications after midnight    [x] If diabetic and have low blood sugar or feel symptomatic, take 1-2oz apple juice only    [] Bring inhalers day of surgery    [] Bring C-PAP/ Bi-Pap day of surgery    [] Bring urine specimen day of surgery    [x] Shower or bath with soap, lather and rinse well, AM of Surgery, no lotion, powders or creams to surgical site    [] Follow bowel prep as instructed per surgeon    [x] No tobacco products within 24 hours of surgery     [x] No alcohol or illegal drug use within 24 hours of surgery.     [x] Jewelry, body piercing's, eyeglasses, contact lenses and dentures are not permitted into surgery (bring cases)      [] Please do not wear any nail polish, make up or hair products on the day of surgery    [x] You can expect a call the business day prior to procedure to notify you if your arrival time changes    [x] If you receive a survey after surgery we would greatly appreciate your comments    [] Parent/guardian of a minor must accompany their child and remain on the premises  the entire time they are under our care     [] Pediatric patients may bring favorite toy, blanket or comfort item with them    [] A caregiver or family member must remain with the patient during their stay if they are mentally handicapped, have dementia, disoriented or unable to use a call light or would be a safety concern if left unattended    [x] Please notify surgeon if you develop any illness between now and time of surgery (cold, cough, sore throat, fever, nausea, vomiting) or any signs of infections  including skin, wounds, and dental.    [x]  The Outpatient Pharmacy is available to fill your prescription here on your day of surgery, ask your preop nurse for details    [] Other instructions    EDUCATIONAL MATERIALS PROVIDED:    [] PAT Preoperative Education Packet/Booklet     [] Medication List    [] Transfusion bracelet applied with instructions    [] Shower with soap, lather and rinse well, and use CHG wipes provided the evening before surgery as instructed    [] Incentive spirometer with instructions

## 2021-07-01 ENCOUNTER — HOSPITAL ENCOUNTER (OUTPATIENT)
Dept: GENERAL RADIOLOGY | Age: 51
Discharge: HOME | End: 2021-07-03
Attending: ANESTHESIOLOGY
Payer: COMMERCIAL

## 2021-07-01 ENCOUNTER — HOSPITAL ENCOUNTER (OUTPATIENT)
Age: 51
Setting detail: OUTPATIENT SURGERY
Discharge: HOME | End: 2021-07-01
Attending: ANESTHESIOLOGY | Admitting: ANESTHESIOLOGY
Payer: COMMERCIAL

## 2021-07-01 ENCOUNTER — ANESTHESIA EVENT (OUTPATIENT)
Dept: OPERATING ROOM | Age: 51
End: 2021-07-01
Payer: COMMERCIAL

## 2021-07-01 ENCOUNTER — ANESTHESIA (OUTPATIENT)
Dept: OPERATING ROOM | Age: 51
End: 2021-07-01
Payer: COMMERCIAL

## 2021-07-01 VITALS
OXYGEN SATURATION: 96 % | WEIGHT: 285 LBS | RESPIRATION RATE: 18 BRPM | BODY MASS INDEX: 37.77 KG/M2 | HEART RATE: 72 BPM | TEMPERATURE: 96.8 F | HEIGHT: 73 IN | SYSTOLIC BLOOD PRESSURE: 140 MMHG | DIASTOLIC BLOOD PRESSURE: 82 MMHG

## 2021-07-01 VITALS — DIASTOLIC BLOOD PRESSURE: 88 MMHG | SYSTOLIC BLOOD PRESSURE: 121 MMHG | OXYGEN SATURATION: 97 %

## 2021-07-01 DIAGNOSIS — R52 PAIN MANAGEMENT: ICD-10-CM

## 2021-07-01 LAB — METER GLUCOSE: 129 MG/DL (ref 74–99)

## 2021-07-01 PROCEDURE — 3209999900 FLUORO FOR SURGICAL PROCEDURES

## 2021-07-01 PROCEDURE — 7100000010 HC PHASE II RECOVERY - FIRST 15 MIN: Performed by: ANESTHESIOLOGY

## 2021-07-01 PROCEDURE — 27096 INJECT SACROILIAC JOINT: CPT | Performed by: ANESTHESIOLOGY

## 2021-07-01 PROCEDURE — 2500000003 HC RX 250 WO HCPCS: Performed by: ANESTHESIOLOGY

## 2021-07-01 PROCEDURE — 6360000002 HC RX W HCPCS

## 2021-07-01 PROCEDURE — 62323 NJX INTERLAMINAR LMBR/SAC: CPT | Performed by: ANESTHESIOLOGY

## 2021-07-01 PROCEDURE — 6360000002 HC RX W HCPCS: Performed by: NURSE ANESTHETIST, CERTIFIED REGISTERED

## 2021-07-01 PROCEDURE — 82962 GLUCOSE BLOOD TEST: CPT

## 2021-07-01 PROCEDURE — 2709999900 HC NON-CHARGEABLE SUPPLY: Performed by: ANESTHESIOLOGY

## 2021-07-01 PROCEDURE — 6360000004 HC RX CONTRAST MEDICATION: Performed by: ANESTHESIOLOGY

## 2021-07-01 PROCEDURE — 3600000002 HC SURGERY LEVEL 2 BASE: Performed by: ANESTHESIOLOGY

## 2021-07-01 PROCEDURE — 2580000003 HC RX 258: Performed by: NURSE ANESTHETIST, CERTIFIED REGISTERED

## 2021-07-01 PROCEDURE — 6360000002 HC RX W HCPCS: Performed by: ANESTHESIOLOGY

## 2021-07-01 PROCEDURE — 3700000000 HC ANESTHESIA ATTENDED CARE: Performed by: ANESTHESIOLOGY

## 2021-07-01 PROCEDURE — 7100000011 HC PHASE II RECOVERY - ADDTL 15 MIN: Performed by: ANESTHESIOLOGY

## 2021-07-01 RX ORDER — FENTANYL CITRATE 50 UG/ML
INJECTION, SOLUTION INTRAMUSCULAR; INTRAVENOUS PRN
Status: DISCONTINUED | OUTPATIENT
Start: 2021-07-01 | End: 2021-07-01 | Stop reason: SDUPTHER

## 2021-07-01 RX ORDER — MIDAZOLAM HYDROCHLORIDE 1 MG/ML
INJECTION INTRAMUSCULAR; INTRAVENOUS PRN
Status: DISCONTINUED | OUTPATIENT
Start: 2021-07-01 | End: 2021-07-01 | Stop reason: SDUPTHER

## 2021-07-01 RX ORDER — LIDOCAINE HYDROCHLORIDE 5 MG/ML
INJECTION, SOLUTION INFILTRATION; INTRAVENOUS PRN
Status: DISCONTINUED | OUTPATIENT
Start: 2021-07-01 | End: 2021-07-01 | Stop reason: ALTCHOICE

## 2021-07-01 RX ORDER — METHYLPREDNISOLONE ACETATE 40 MG/ML
INJECTION, SUSPENSION INTRA-ARTICULAR; INTRALESIONAL; INTRAMUSCULAR; SOFT TISSUE PRN
Status: DISCONTINUED | OUTPATIENT
Start: 2021-07-01 | End: 2021-07-01 | Stop reason: ALTCHOICE

## 2021-07-01 RX ORDER — BUPIVACAINE HYDROCHLORIDE 5 MG/ML
INJECTION, SOLUTION EPIDURAL; INTRACAUDAL PRN
Status: DISCONTINUED | OUTPATIENT
Start: 2021-07-01 | End: 2021-07-01 | Stop reason: ALTCHOICE

## 2021-07-01 RX ORDER — SODIUM CHLORIDE 9 MG/ML
INJECTION, SOLUTION INTRAVENOUS CONTINUOUS PRN
Status: DISCONTINUED | OUTPATIENT
Start: 2021-07-01 | End: 2021-07-01 | Stop reason: SDUPTHER

## 2021-07-01 RX ADMIN — SODIUM CHLORIDE: 9 INJECTION, SOLUTION INTRAVENOUS at 14:07

## 2021-07-01 RX ADMIN — FENTANYL CITRATE 100 MCG: 50 INJECTION, SOLUTION INTRAMUSCULAR; INTRAVENOUS at 14:09

## 2021-07-01 RX ADMIN — MIDAZOLAM 2 MG: 1 INJECTION INTRAMUSCULAR; INTRAVENOUS at 14:07

## 2021-07-01 ASSESSMENT — PAIN - FUNCTIONAL ASSESSMENT: PAIN_FUNCTIONAL_ASSESSMENT: 0-10

## 2021-07-01 ASSESSMENT — LIFESTYLE VARIABLES: SMOKING_STATUS: 0

## 2021-07-01 NOTE — ANESTHESIA PRE PROCEDURE
Department of Anesthesiology  Preprocedure Note       Name:  Chucky Aguilar   Age:  52 y.o.  :  1970                                          MRN:  [id number]         Date:  2021      Surgeon: Keri Bond):  Brianda Pruitt MD    Procedure: Procedure(s):  LUMBAR EPIDURAL STEROID INJECTION UNDER FLUOROSCOPIC GUIDANCE AT L4-L5 LEFT PARAMEDIAN    Medications prior to admission:   Prior to Admission medications    Medication Sig Start Date End Date Taking? Authorizing Provider   LANTUS SOLOSTAR 100 UNIT/ML injection pen Inject 32 Units into the skin nightly  21   Historical Provider, MD   Lidocaine 1.8 % PTCH Apply patch 12 hours and remove for 12 hours 21   Brianda Pruitt MD   cyclobenzaprine (FLEXERIL) 5 MG tablet Take 5 mg by mouth 3 times daily as needed for Muscle spasms    Historical Provider, MD   TRULICITY 1.5 EA/3.4QM SOPN 1.5 mg once a week  10/16/20   Historical Provider, MD   pioglitazone (ACTOS) 15 MG tablet take 1 tablet by mouth once daily 10/14/20   Historical Provider, MD   pantoprazole (PROTONIX) 40 MG tablet Take 40 mg by mouth daily     Historical Provider, MD   ALPRAZolam Parker Leyland) 0.5 MG tablet as needed. Historical Provider, MD   methylphenidate (CONCERTA) 27 MG extended release tablet Take 27 mg by mouth as needed. Historical Provider, MD   glimepiride (AMARYL) 4 MG tablet Take 4 mg by mouth 2 times daily    Historical Provider, MD       Current medications:    No current outpatient medications on file. No current facility-administered medications for this visit.        Allergies:  No Known Allergies    Problem List:    Patient Active Problem List   Diagnosis Code    Lumbosacral spondylosis without myelopathy M47.817    DDD (degenerative disc disease), lumbar M51.36    C7 radiculopathy M54.12    Mood disorder (HCC) F39    Multiple joint pain M25.50    Neck pain on left side M54.2    Pain and swelling of left elbow M25.522, M25.422    Pain in left 8.2 06/05/2018    CALCIUM 9.1 06/05/2018    BILITOT 0.4 06/05/2018    ALKPHOS 86 06/05/2018    AST 18 06/05/2018    ALT 29 06/05/2018       POC Tests: No results for input(s): POCGLU, POCNA, POCK, POCCL, POCBUN, POCHEMO, POCHCT in the last 72 hours. Coags: No results found for: PROTIME, INR, APTT    HCG (If Applicable): No results found for: PREGTESTUR, PREGSERUM, HCG, HCGQUANT     ABGs: No results found for: PHART, PO2ART, YCO0MUJ, AGR8IFY, BEART, B5EVTPCL     Type & Screen (If Applicable):  No results found for: LABABO, LABRH    Drug/Infectious Status (If Applicable):  No results found for: HIV, HEPCAB    COVID-19 Screening (If Applicable): No results found for: COVID19        Anesthesia Evaluation  Patient summary reviewed no history of anesthetic complications:   Airway: Mallampati: II  TM distance: >3 FB   Neck ROM: full  Mouth opening: > = 3 FB Dental:          Pulmonary:       (-) not a current smoker                           Cardiovascular:  Exercise tolerance: good (>4 METS),           Rhythm: regular  Rate: normal                    Neuro/Psych:   (+) neuromuscular disease:, psychiatric history:            GI/Hepatic/Renal:   (+) GERD:, renal disease:,      Liver disease: proteinuria. Endo/Other:    (+) DiabetesType II DM, , .                 Abdominal:   (+) obese,           Vascular: Other Findings:               Anesthesia Plan      MAC     ASA 2       Induction: intravenous. Anesthetic plan and risks discussed with patient. Plan discussed with CRNA.                   Jayshree Tavarez MD   7/1/2021

## 2021-07-01 NOTE — OP NOTE
Operative Note      Patient: Jez Smyth  YOB: 1970  MRN: [id number]    Date of Procedure: 2021    Pre-Op Diagnosis: LUMBAR RADICULOPATHY, lumbar DDD, sacroiliac dysfunction    Post-Op Diagnosis: Same       Procedure(s):  1) LUMBAR EPIDURAL STEROID INJECTION UNDER FLUOROSCOPIC GUIDANCE AT L4-L5 LEFT PARAMEDIAN LEFT SACROILIAC JOINT INJECTION    2) Left sacroiliac joint injection under fluoroscopic guidance. Surgeon(s):  Corinne Clarity, MD    Assistant:   * No surgical staff found *    Anesthesia: Monitor Anesthesia Care    Estimated Blood Loss (mL): Minimal    Complications: None    Specimens:   * No specimens in log *    Implants:  * No implants in log *      Drains: * No LDAs found *    Findings: good needle placement    Detailed Description of Procedure:   2021    Patient: Jez Smyth  :  1970  Age:  52 y.o. Sex:  male     PRE-OPERATIVE DIAGNOSIS: Lumbar disc displacement, lumbar radiculopathy, sacroiliac dysfunction. POST-OPERATIVE DIAGNOSIS: Same. PROCEDURE:   1) LUMBAR EPIDURAL STEROID INJECTION UNDER FLUOROSCOPIC GUIDANCE AT L4-L5 LEFT PARAMEDIAN LEFT SACROILIAC JOINT INJECTION    2) Left sacroiliac joint injection under fluoroscopic guidance. SURGEON: Corinne Clarity, MD    ANESTHESIA: MAC    ESTIMATED BLOOD LOSS: None.  ______________________________________________________________________    BRIEF HISTORY:  Jez Smyth comes in today for the above procedure. The potential complications of this procedure were discussed with him again today. He has elected to undergo the aforementioned procedure. Mirza complete History & Physical examination were reviewed in depth, a copy of which is in the chart.       DESCRIPTION OF PROCEDURE:    After confirming written and informed consent, a time-out was performed and Mirza name and date of birth, the procedure to be performed as well as the plan for the location of the needle insertion were confirmed. The patient was brought into the procedure room and placed in the prone position on the fluoroscopy table. A pillow was placed under the patient's lower abdomen/upper pelvis to increase lumbar interlaminar space. Standard monitors were placed, and vital signs were observed throughout the procedure. The area of the lumbar spine and SIJ area was prepped with chloraprep and draped in a sterile manner. The L4-5 interspace was identified and marked under AP fluoroscopy. The skin and subcutaneous tissues at the above level were anesthestized with 0.5% lidocaine. With intermittent fluoroscopy, an # 18 gauge 6 inch tuohy epidural needle was inserted and directed toward the interlaminar space. The needle was slowly advanced using loss of resistance technique and 5 cc glass syringe  until the tip of the epidural needle has passed through the ligamentum flavum and entered the epidural space. AP and lateral fluoroscopic imaging is performed to verify that the epidural needle is properly placed. Negative aspiration of blood and CSF was confirmed. 0.5 ml of omnipaque 240 was used for confirmation of even epidural spread under both live and AP fluoroscopy. After negative aspiration, a solution of 0.5 % Lidocaine 3 ml and 40 mg DepoMedrol was easily injected. The needle was gently removed intact . The patient back was cleaned and a Band-Aid was placed over the needle insertion point.    - AP fluoroscopy was used to visualize the sacroiliac joint. The fluoroscopic beam was then obliqued until the anterior and posterior margins of the joint were aligned. The inferior margin of the joint was identified and marked. The skin and subcutaneous tissue about this identified point were anesthestized with 0.5% lidocaine. A 22 gauge 3-1/2 spinal needle was advanced toward the the identified point under fluoroscopic guidance.  Once the targeted point was reached and the joint space was entered, negative aspiration was confirmed, and 0.5 cc of 240 omnipaque was injected. The  Joint space was appropriately outlined. Then, after negative aspiration, a solution consisting of 0.5% marcaine 2 cc and 20 mg DepoMedrol was easily injected. The needle was then removed and the needle insertion site was covered with Band-Aid. Disposition the patient tolerated the procedure well and there were no complications . Vital signs remained stable throughout the procedure. The patient was escorted to the recovery area where they remained until discharge and written discharge instructions for the procedure were given. Plan: Tessy Patel will return to our pain management center as scheduled.      Brianda Pruitt MD

## 2021-07-02 NOTE — ANESTHESIA POSTPROCEDURE EVALUATION
Department of Anesthesiology  Postprocedure Note    Patient: Aurelio Fields  MRN: [id number]  YOB: 1970  Date of evaluation: 7/2/2021  Time:  2:11 AM     Procedure Summary     Date: 07/01/21 Room / Location: [facility] PROCEDURE ROOM 02 / [facility]    Anesthesia Start: 3053 Anesthesia Stop: 0383    Procedure: LUMBAR EPIDURAL STEROID INJECTION UNDER FLUOROSCOPIC GUIDANCE AT L4-L5 LEFT PARAMEDIAN LEFT SACROILIAC JOINT INJECTION (Left Back) Diagnosis: (LUMBAR RADICULOPATHY)    Surgeons: Zita Molina MD Responsible Provider: Ariadna Paige MD    Anesthesia Type: MAC ASA Status: 2          Anesthesia Type: MAC    Reyna Phase I: Reyna Score: 10    Reyna Phase II: Reyna Score: 10    Last vitals: Reviewed and per EMR flowsheets.        Anesthesia Post Evaluation    Patient location during evaluation: PACU  Patient participation: complete - patient participated  Level of consciousness: awake and alert  Airway patency: patent  Nausea & Vomiting: no vomiting and no nausea  Complications: no  Cardiovascular status: hemodynamically stable  Respiratory status: spontaneous ventilation  Hydration status: stable

## 2021-08-02 ENCOUNTER — PREP FOR PROCEDURE (OUTPATIENT)
Dept: PAIN MANAGEMENT | Age: 51
End: 2021-08-02

## 2021-08-02 ENCOUNTER — OFFICE VISIT (OUTPATIENT)
Dept: PAIN MANAGEMENT | Age: 51
End: 2021-08-02
Payer: COMMERCIAL

## 2021-08-02 VITALS
RESPIRATION RATE: 16 BRPM | HEART RATE: 90 BPM | TEMPERATURE: 96.5 F | OXYGEN SATURATION: 98 % | WEIGHT: 285 LBS | HEIGHT: 73 IN | BODY MASS INDEX: 37.77 KG/M2 | DIASTOLIC BLOOD PRESSURE: 70 MMHG | SYSTOLIC BLOOD PRESSURE: 110 MMHG

## 2021-08-02 DIAGNOSIS — M53.3 SACROILIAC DYSFUNCTION: Primary | ICD-10-CM

## 2021-08-02 DIAGNOSIS — M47.817 LUMBOSACRAL SPONDYLOSIS WITHOUT MYELOPATHY: ICD-10-CM

## 2021-08-02 DIAGNOSIS — E66.9 OBESITY, UNSPECIFIED CLASSIFICATION, UNSPECIFIED OBESITY TYPE, UNSPECIFIED WHETHER SERIOUS COMORBIDITY PRESENT: ICD-10-CM

## 2021-08-02 DIAGNOSIS — M51.36 DDD (DEGENERATIVE DISC DISEASE), LUMBAR: ICD-10-CM

## 2021-08-02 PROCEDURE — 99213 OFFICE O/P EST LOW 20 MIN: CPT | Performed by: ANESTHESIOLOGY

## 2021-08-02 PROCEDURE — G8427 DOCREV CUR MEDS BY ELIG CLIN: HCPCS | Performed by: ANESTHESIOLOGY

## 2021-08-02 PROCEDURE — G8417 CALC BMI ABV UP PARAM F/U: HCPCS | Performed by: ANESTHESIOLOGY

## 2021-08-02 PROCEDURE — 1036F TOBACCO NON-USER: CPT | Performed by: ANESTHESIOLOGY

## 2021-08-02 PROCEDURE — 99214 OFFICE O/P EST MOD 30 MIN: CPT | Performed by: ANESTHESIOLOGY

## 2021-08-02 RX ORDER — HYDROCODONE BITARTRATE AND ACETAMINOPHEN 5; 325 MG/1; MG/1
1 TABLET ORAL 2 TIMES DAILY PRN
Qty: 14 TABLET | Refills: 0 | Status: SHIPPED
Start: 2021-08-02 | End: 2021-12-06 | Stop reason: SDUPTHER

## 2021-08-02 RX ORDER — NABUMETONE 750 MG/1
750 TABLET, FILM COATED ORAL 2 TIMES DAILY PRN
Qty: 40 TABLET | Refills: 0 | Status: SHIPPED
Start: 2021-08-02 | End: 2021-10-14 | Stop reason: SDUPTHER

## 2021-08-02 NOTE — PROGRESS NOTES
[facility]  [place], [address]  Dept: [phone]    Follow up Note      Jamie Henao     Date of Visit:  8/2/2021     CC:  Patient presents for follow up   Chief Complaint   Patient presents with    Follow-up     post procedure      HPI:  Chronic low back pain.     Started > a year ago after he felt a pop while he was stopping a trailer in Nov 2019.     Pain over the left low back and radiates to the left groin.     Tried multiple modalities of treatment including -meds/ chiropractic treatment, PT.     Has been evaluated by NSG and referred for interventions. S/P Left lumbar MBNB- no significant pain relief. Nursing notes and details of the pain history reviewed. Please see intake notes for details.     Previous treatments:   Physical Therapy / HEP: yes,       Chiropractic treatment: yes, for > 2 months recently     Medications: - NSAID's : yes                       - Membrane stabilizers : yes- gabapentin                       - Opioids : not on chronic opioids                       - Adjuvants or Others : yes     TENS Unit: no     Surgeries: no LS or hip surgery      He has not been on anticoagulation medications      He has not been on herbal supplements.       He is diabetic.     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     Imaging:   MRI of LS spine: 4/6/2021:         X- ray Pelvis: 7/16/2020:      FINDINGS:   No fracture or dislocation seen. Bone mineralization is normal for   age. There is joint space narrowing in the bilateral hips. Nonobstructed bowel gas pattern.  Multiple pelvic phleboliths.           Impression   Moderate degenerative changes in the bilateral hips.                Potential Aberrant Drug-Related Behavior: no     Urine Drug Screening: no    OARRS report[de-identified]  reviewed today- consistent    Past Medical History:   Diagnosis Date    Acid reflux     ADHD     DM (diabetes education level: Not on file   Occupational History    Not on file   Tobacco Use    Smoking status: Never Smoker    Smokeless tobacco: Never Used   Vaping Use    Vaping Use: Never used   Substance and Sexual Activity    Alcohol use: Yes     Alcohol/week: 2.0 standard drinks     Types: 2 Glasses of wine per week     Comment: Socially    Drug use: Never    Sexual activity: Yes     Partners: Female   Other Topics Concern    Not on file   Social History Narrative    Not on file     Social Determinants of Health     Financial Resource Strain:     Difficulty of Paying Living Expenses:    Food Insecurity:     Worried About Running Out of Food in the Last Year:     [address] in the Last Year:    Transportation Needs:     Lack of Transportation (Medical):  Lack of Transportation (Non-Medical):    Physical Activity:     Days of Exercise per Week:     Minutes of Exercise per Session:    Stress:     Feeling of Stress :    Social Connections:     Frequency of Communication with Friends and Family:     Frequency of Social Gatherings with Friends and Family:     Attends Jainism Services:     Active Member of Clubs or Organizations:     Attends Club or Organization Meetings:     Marital Status:    Intimate Partner Violence:     Fear of Current or Ex-Partner:     Emotionally Abused:     Physically Abused:     Sexually Abused:        Family History   Adopted: Yes       REVIEW OF SYSTEMS:     Elmarie Grams denies fever/chills, chest pain, shortness of breath, new bowel or bladder complaints. All other review of systems was negative.     PHYSICAL EXAMINATION:      /70   Pulse 90   Temp 96.5 °F (35.8 °C) (Infrared)   Resp 16   Ht 6' 1\" (1.854 m)   Wt 285 lb (129.3 kg)   SpO2 98%   BMI 37.60 kg/m²   General:       General appearance:  Pleasant and well-hydrated, in no distress and A & O x 3  Build:Obese  Function: Rises from seated position easily and Moves about room without difficulty     HEENT:     Head:normocephalic, atraumatic       Lungs:     Breathing:normal breathing pattern      CVS:     RRR     Abdomen:     Shape:obese, non-distended and normal       Cervical spine:     Inspection:normal     Thoracic spine:                Spine inspection:normal      Lumbar spine:     Spine inspection: Normal   Palpation: Tenderness paravertebral muscles Yes left  Range of motion: Decreased, flexion Decreased, Lateral bending, extension and rotation bilaterally reduced is painful. Lumbar facet loading + left  Sacroiliac joint tenderness Yes left  Gaenslen's + left, Nella's + left  Piriformis tenderness: negative bilaterally  SLR : negative bilaterally  Trochanteric bursa tenderness: positive left  CVA tenderness:No      Musculoskeletal:     Trigger points no     Extremities:     Tremors:None bilaterally upper and lower  Edema:none x all 4 extremities     Neurological:     Sensory: Normal to light touch      Motor:   Right  5/5              Left  5/5               Right Bicep 5/5           Left Bicep 5/5              Right Triceps 5/5       Left Triceps 5/5          Right Deltoid 5/5     Left Deltoid 5/5                  Right Quadriceps 5/5          Left Quadriceps 5/5           Right Gastrocnemius 5/5    Left Gastrocnemius 5/5  Right Ant Tibialis 5/5  Left Ant Tibialis 5/5     Reflexes:    B/l equal reduced     Gait:normal Yes     Dermatology:     Skin:no rashes or lesions noted    Assessment/Plan:     Diagnosis Orders   1. Lumbosacral spondylosis without myelopathy      2. DDD (degenerative disc disease), lumbar      3. Sacroiliac dysfunction      4.  Obesity, unspecified classification, unspecified obesity type, unspecified whether serious comorbidity present            52 y.o. male with h/o low back pain for > a year following an injury.     Pain over the left low back - predominantly axial in nature.     S/P Lumbar facet MBNB- not much relief.     Failed > 6 weeks of conservative treatment recently.     MRI: of LS spine reviewed. He had CD reviewed personally.     Has been evaluated by NSG and referred for interventions. Diagnostic lumbar facet medial branch block did not provide significant pain relief. S/P LESI + left SIJ injection > 80% relief for 2 weeks. Now has recurrence of pain but not as intense. Continues to do HEP.    Plan: Will do left S1, S2, S3 & L5 DR block under fluoroscopy. RBA discussed. Will do with moderate sedation. If short term relief, will do RFA.     Gabapentin - caused drowsiness. D/ C gabapentin. Muscle relaxant for prn us. Short course of Relafen for prn use # 30. Use instructions reviewed (Caution for long term use). Short course of Norco # 14 for prn use. He is travelling for a conference and deonna like to have something just in case he notices increased pain.     ZT lido patch. Samples and script given. Script given. If issues with her coverage for ZT lido, consider local compound cream.      Counseling :Patient encouraged to stay active and to watch/lose weight and to do Regular home exercise program as tolerated - stretching / strengthening.     Treatment plan discussed with the patient including medication and procedure side effects.     Controlled Substances Monitoring:      OARRS reviewed- not on chronic opioids.     We discussed with the patient that combining opioids, benzodiazepines, alcohol, illicit drugs or sleep aids increases the risk of respiratory depression including death. We discussed that these medications may cause drowsiness, sedation or dizziness and have counseled the patient not to drive or operate machinery. We have discussed that these medications will be prescribed only by one provider. We have discussed with the patient about age related risk factors and have thoroughly discussed the importance of taking these medications as prescribed.  The patient verbalizes understanding.     Rajat Oleary, MD      CC:  Latisha Rinaldi MD

## 2021-08-02 NOTE — PROGRESS NOTES
Do you currently have any of the following:    Fever: No  Headache:  No  Cough: No  Shortness of breath: No  Exposed to anyone with these symptoms: Lubna Lam TONI Henao presents to the [address] [place] [address] on 8/2/2021. Pauline Rudd is complaining of pain lower back /left hip  The pain is constant. The pain is described as aching, throbbing, shooting and stabbing. Pain is rated on his best day at a 5, on his worst day at a 10, and on average at a 6 on the VAS scale. He took his last dose of     Any procedures since your last visit:     Pacemaker or defibrillator: No managed by     He is not on NSAIDS and is not on anticoagulation medications to include none and is managed by      Medication Contract and Consent for Opioid Use Documents Filed      No documents found                /70   Pulse 90   Temp 96.5 °F (35.8 °C) (Infrared)   Resp 16   Ht 6' 1\" (1.854 m)   Wt 285 lb (129.3 kg)   SpO2 98%   BMI 37.60 kg/m²      No LMP for male patient.

## 2021-08-09 ENCOUNTER — TELEPHONE (OUTPATIENT)
Dept: PAIN MANAGEMENT | Age: 51
End: 2021-08-09

## 2021-08-11 ENCOUNTER — TELEPHONE (OUTPATIENT)
Dept: PAIN MANAGEMENT | Age: 51
End: 2021-08-11

## 2021-08-11 NOTE — TELEPHONE ENCOUNTER
Authorization obtained from insurance company [facility]) for LEFT S1, S2, S3 & L5 Dorsal amus Block-fluoro (Authorization# [id number]) .  Sent to [facility] OR and scanned to chart. /dmb

## 2021-08-17 NOTE — PROGRESS NOTES
[facility] PRE-ADMISSION TESTING INSTRUCTIONS      ARRIVAL INSTRUCTIONS:  [x] Parking the day of Surgery is located in the Main Entrance lot. Upon entering the main door make an immediate right to the surgery reception desk. [x] Bring photo ID and insurance card    [] Bring in a copy of Living will or Durable Power of  papers. [x] Please be sure to arrange for responsible adult to provide transportation to and from the hospital    [x] Please arrange for responsible adult to be with you for the 24 hour period post procedure due to having anesthesia      GENERAL INSTRUCTIONS:    [x] Nothing by mouth after midnight, including gum, candy, mints or water    [x] You may brush your teeth, but do not swallow any water    [x] Take medications as instructed with 1-2 oz of water    [] Stop herbal supplements and vitamins 5 days prior to procedure    [] Follow preop dosing of blood thinners per physician instructions    [] Take 1/2 dose of evening insulin, but no insulin after midnight    [] No oral diabetic medications after midnight    [] If diabetic and have low blood sugar or feel symptomatic, take 1-2oz apple juice only    [] Bring inhalers day of surgery    [] Bring C-PAP/ Bi-Pap day of surgery    [] Bring urine specimen day of surgery    [] Shower or bath with soap, lather and rinse well, AM of Surgery, no lotion, powders or creams to surgical site    [] Follow bowel prep as instructed per surgeon    [x] No tobacco products within 24 hours of surgery     [x] No alcohol or illegal drug use within 24 hours of surgery.     [x] Jewelry, body piercing's, eyeglasses, contact lenses and dentures are not permitted into surgery (bring cases)      [] Please do not wear any nail polish, make up or hair products on the day of surgery    [x] You can expect a call the business day prior to procedure to notify  arrival time     [x] If you receive a survey after surgery we would greatly appreciate your comments    [x] Please notify surgeon if you develop any illness between now and time of surgery (cold, cough, sore throat, fever, nausea, vomiting) or any signs of infections  including skin, wounds, and dental.    []  The Outpatient Pharmacy is available to fill your prescription here on your day of surgery, ask your preop nurse for details

## 2021-08-17 NOTE — PROGRESS NOTES
Have you been tested for COVID  Yes           Have you been told you were positive for COVID No  Have you had any known exposure to someone that is positive for COVID No  Do you have a cough                   No              Do you have shortness of breath No                 Do you have a sore throat            No                Are you having chills                    No                Are you having muscle aches. No                    Please come to the hospital wearing a mask and have your significant other wear a mask as well. Both of you should check your temperature before leaving to come here,  if it is 100 or higher please call [phone] for instruction.

## 2021-08-19 ENCOUNTER — ANESTHESIA (OUTPATIENT)
Dept: OPERATING ROOM | Age: 51
End: 2021-08-19
Payer: COMMERCIAL

## 2021-08-19 ENCOUNTER — HOSPITAL ENCOUNTER (OUTPATIENT)
Age: 51
Setting detail: OUTPATIENT SURGERY
Discharge: HOME | End: 2021-08-19
Attending: ANESTHESIOLOGY | Admitting: ANESTHESIOLOGY
Payer: COMMERCIAL

## 2021-08-19 ENCOUNTER — HOSPITAL ENCOUNTER (OUTPATIENT)
Dept: GENERAL RADIOLOGY | Age: 51
Setting detail: OUTPATIENT SURGERY
Discharge: HOME | End: 2021-08-21
Attending: ANESTHESIOLOGY
Payer: COMMERCIAL

## 2021-08-19 ENCOUNTER — ANESTHESIA EVENT (OUTPATIENT)
Dept: OPERATING ROOM | Age: 51
End: 2021-08-19
Payer: COMMERCIAL

## 2021-08-19 VITALS
OXYGEN SATURATION: 95 % | BODY MASS INDEX: 37.77 KG/M2 | DIASTOLIC BLOOD PRESSURE: 88 MMHG | WEIGHT: 285 LBS | HEART RATE: 71 BPM | HEIGHT: 73 IN | RESPIRATION RATE: 16 BRPM | SYSTOLIC BLOOD PRESSURE: 143 MMHG

## 2021-08-19 VITALS — OXYGEN SATURATION: 96 % | DIASTOLIC BLOOD PRESSURE: 97 MMHG | SYSTOLIC BLOOD PRESSURE: 142 MMHG

## 2021-08-19 DIAGNOSIS — R52 PAIN MANAGEMENT: ICD-10-CM

## 2021-08-19 PROCEDURE — 3700000000 HC ANESTHESIA ATTENDED CARE: Performed by: ANESTHESIOLOGY

## 2021-08-19 PROCEDURE — 64450 NJX AA&/STRD OTHER PN/BRANCH: CPT | Performed by: ANESTHESIOLOGY

## 2021-08-19 PROCEDURE — 3600000002 HC SURGERY LEVEL 2 BASE: Performed by: ANESTHESIOLOGY

## 2021-08-19 PROCEDURE — 7100000010 HC PHASE II RECOVERY - FIRST 15 MIN: Performed by: ANESTHESIOLOGY

## 2021-08-19 PROCEDURE — 7100000011 HC PHASE II RECOVERY - ADDTL 15 MIN: Performed by: ANESTHESIOLOGY

## 2021-08-19 PROCEDURE — 2709999900 HC NON-CHARGEABLE SUPPLY: Performed by: ANESTHESIOLOGY

## 2021-08-19 PROCEDURE — 2500000003 HC RX 250 WO HCPCS: Performed by: ANESTHESIOLOGY

## 2021-08-19 PROCEDURE — 64451 NJX AA&/STRD NRV NRVTG SI JT: CPT | Performed by: ANESTHESIOLOGY

## 2021-08-19 PROCEDURE — 3209999900 FLUORO FOR SURGICAL PROCEDURES

## 2021-08-19 PROCEDURE — 2580000003 HC RX 258: Performed by: NURSE ANESTHETIST, CERTIFIED REGISTERED

## 2021-08-19 PROCEDURE — 6360000002 HC RX W HCPCS: Performed by: NURSE ANESTHETIST, CERTIFIED REGISTERED

## 2021-08-19 PROCEDURE — 6360000002 HC RX W HCPCS

## 2021-08-19 PROCEDURE — 3700000001 HC ADD 15 MINUTES (ANESTHESIA): Performed by: ANESTHESIOLOGY

## 2021-08-19 PROCEDURE — 6360000002 HC RX W HCPCS: Performed by: ANESTHESIOLOGY

## 2021-08-19 PROCEDURE — 3600000012 HC SURGERY LEVEL 2 ADDTL 15MIN: Performed by: ANESTHESIOLOGY

## 2021-08-19 RX ORDER — FENTANYL CITRATE 50 UG/ML
INJECTION, SOLUTION INTRAMUSCULAR; INTRAVENOUS PRN
Status: DISCONTINUED | OUTPATIENT
Start: 2021-08-19 | End: 2021-08-19 | Stop reason: SDUPTHER

## 2021-08-19 RX ORDER — LIDOCAINE HYDROCHLORIDE 5 MG/ML
INJECTION, SOLUTION INFILTRATION; INTRAVENOUS PRN
Status: DISCONTINUED | OUTPATIENT
Start: 2021-08-19 | End: 2021-08-19 | Stop reason: ALTCHOICE

## 2021-08-19 RX ORDER — METHYLPREDNISOLONE ACETATE 40 MG/ML
INJECTION, SUSPENSION INTRA-ARTICULAR; INTRALESIONAL; INTRAMUSCULAR; SOFT TISSUE PRN
Status: DISCONTINUED | OUTPATIENT
Start: 2021-08-19 | End: 2021-08-19 | Stop reason: ALTCHOICE

## 2021-08-19 RX ORDER — BUPIVACAINE HYDROCHLORIDE 5 MG/ML
INJECTION, SOLUTION EPIDURAL; INTRACAUDAL PRN
Status: DISCONTINUED | OUTPATIENT
Start: 2021-08-19 | End: 2021-08-19 | Stop reason: ALTCHOICE

## 2021-08-19 RX ORDER — SODIUM CHLORIDE 9 MG/ML
INJECTION, SOLUTION INTRAVENOUS CONTINUOUS PRN
Status: DISCONTINUED | OUTPATIENT
Start: 2021-08-19 | End: 2021-08-19 | Stop reason: SDUPTHER

## 2021-08-19 RX ORDER — MIDAZOLAM HYDROCHLORIDE 1 MG/ML
INJECTION INTRAMUSCULAR; INTRAVENOUS PRN
Status: DISCONTINUED | OUTPATIENT
Start: 2021-08-19 | End: 2021-08-19 | Stop reason: SDUPTHER

## 2021-08-19 RX ADMIN — MIDAZOLAM 2 MG: 1 INJECTION INTRAMUSCULAR; INTRAVENOUS at 12:01

## 2021-08-19 RX ADMIN — FENTANYL CITRATE 100 MCG: 50 INJECTION, SOLUTION INTRAMUSCULAR; INTRAVENOUS at 12:02

## 2021-08-19 RX ADMIN — FENTANYL CITRATE 100 MCG: 50 INJECTION, SOLUTION INTRAMUSCULAR; INTRAVENOUS at 12:11

## 2021-08-19 RX ADMIN — SODIUM CHLORIDE: 9 INJECTION, SOLUTION INTRAVENOUS at 11:52

## 2021-08-19 ASSESSMENT — LIFESTYLE VARIABLES: SMOKING_STATUS: 0

## 2021-08-19 NOTE — ANESTHESIA PRE PROCEDURE
Department of Anesthesiology  Preprocedure Note       Name:  Ruiz Taylor   Age:  52 y.o.  :  1970                                          MRN:  [id number]         Date:  2021      Surgeon: Sapphire Kasper):  Aly Castillo MD    Procedure: Procedure(s):  LEFT S1, S2, S3 & L5 DORSAL RAMUS  BLOCK UNDER FLUOROSCOPY    Medications prior to admission:   Prior to Admission medications    Medication Sig Start Date End Date Taking? Authorizing Provider   nabumetone (RELAFEN) 750 MG tablet Take 1 tablet by mouth 2 times daily as needed for Pain 21   Aly Castillo MD   LANSREEDHARUS SOLOSTAR 100 UNIT/ML injection pen Inject 32 Units into the skin nightly  21   Historical Provider, MD   Lidocaine 1.8 % PTCH Apply patch 12 hours and remove for 12 hours 21   Aly Castillo MD   TRULICITY 1.5 AJ/4.1LI SOPN 1.5 mg once a week  10/16/20   Historical Provider, MD   pioglitazone (ACTOS) 15 MG tablet take 1 tablet by mouth once daily 10/14/20   Historical Provider, MD   pantoprazole (PROTONIX) 40 MG tablet Take 40 mg by mouth daily     Historical Provider, MD   ALPRAZolam Maryanne Gather) 0.5 MG tablet as needed. Historical Provider, MD   methylphenidate (CONCERTA) 27 MG extended release tablet Take 27 mg by mouth as needed. Historical Provider, MD   glimepiride (AMARYL) 4 MG tablet Take 4 mg by mouth 2 times daily    Historical Provider, MD       Current medications:    No current outpatient medications on file. No current facility-administered medications for this visit.        Allergies:  No Known Allergies    Problem List:    Patient Active Problem List   Diagnosis Code    Lumbosacral spondylosis without myelopathy M47.817    DDD (degenerative disc disease), lumbar M51.36    C7 radiculopathy M54.12    Mood disorder (HCC) F39    Multiple joint pain M25.50    Neck pain on left side M54.2    Pain and swelling of left elbow M25.522, M25.422    Pain in left forearm M79.632    Type 2 diabetes mellitus [facility] V58.3    Sacroiliac dysfunction M53.3       Past Medical History:        Diagnosis Date    Acid reflux     ADHD     DM (diabetes mellitus) (HCC)     Low back pain     Protein in urine        Past Surgical History:        Procedure Laterality Date    ENDOSCOPY, COLON, DIAGNOSTIC      NERVE BLOCK Left 6/8/2021    LEFT LUMBAR MEDIAL BRANCH NERVE BLOCK UNDER FLUOROSCOPIC GUIDANCE AT L2, L3, L4 AND L5 DORSAL RAMI WITH SEDATION (CPT 52773) performed by Berto Manriquez MD at [address] Left 7/1/2021    LUMBAR EPIDURAL STEROID INJECTION UNDER FLUOROSCOPIC GUIDANCE AT L4-L5 LEFT PARAMEDIAN LEFT SACROILIAC JOINT INJECTION performed by Berto Manriquez MD at [address] Right     UPPER GASTROINTESTINAL ENDOSCOPY  10/2020       Social History:    Social History     Tobacco Use    Smoking status: Never Smoker    Smokeless tobacco: Never Used   Substance Use Topics    Alcohol use: Yes     Comment: Socially                                Counseling given: Not Answered      Vital Signs (Current): There were no vitals filed for this visit.                                            BP Readings from Last 3 Encounters:   08/02/21 110/70   07/01/21 (!) 140/82   07/01/21 121/88       NPO Status:  >8.H                                                                               BMI:   Wt Readings from Last 3 Encounters:   08/17/21 285 lb (129.3 kg)   08/02/21 285 lb (129.3 kg)   06/29/21 285 lb (129.3 kg)     There is no height or weight on file to calculate BMI.    CBC:   Lab Results   Component Value Date    WBC 11.4 06/05/2018    RBC 5.42 06/05/2018    HGB 16.6 06/05/2018    HCT 48.6 06/05/2018    MCV 89.7 06/05/2018    RDW 12.7 06/05/2018     06/05/2018       CMP:   Lab Results   Component Value Date     06/05/2018    K 3.7 06/05/2018     06/05/2018    CO2 27 06/05/2018    BUN 8 06/05/2018 CREATININE 1.18 06/05/2018    GFRAA > 60 06/05/2018    LABGLOM >60 06/05/2018    GLUCOSE 113 06/05/2018    PROT 8.2 06/05/2018    CALCIUM 9.1 06/05/2018    BILITOT 0.4 06/05/2018    ALKPHOS 86 06/05/2018    AST 18 06/05/2018    ALT 29 06/05/2018       POC Tests: No results for input(s): POCGLU, POCNA, POCK, POCCL, POCBUN, POCHEMO, POCHCT in the last 72 hours. Coags: No results found for: PROTIME, INR, APTT    HCG (If Applicable): No results found for: PREGTESTUR, PREGSERUM, HCG, HCGQUANT     ABGs: No results found for: PHART, PO2ART, QTO2ZLK, LAD1IHL, BEART, B7DIQWRD     Type & Screen (If Applicable):  No results found for: LABABO, LABRH    Drug/Infectious Status (If Applicable):  No results found for: HIV, HEPCAB    COVID-19 Screening (If Applicable): No results found for: COVID19        Anesthesia Evaluation  Patient summary reviewed no history of anesthetic complications:   Airway: Mallampati: II  TM distance: >3 FB   Neck ROM: full  Mouth opening: > = 3 FB Dental:          Pulmonary: breath sounds clear to auscultation      (-) not a current smoker                           Cardiovascular:  Exercise tolerance: good (>4 METS),       (-) past MI and CAD    ECG reviewed  Rhythm: regular  Rate: normal  Echocardiogram reviewed                  Neuro/Psych:   (+) neuromuscular disease:, psychiatric history:            GI/Hepatic/Renal:   (+) GERD:, renal disease: CRI,      Liver disease: proteinuria. Endo/Other:    (+) DiabetesType II DM, , .                 Abdominal:   (+) obese,           Vascular: negative vascular ROS. Other Findings:               Anesthesia Plan      MAC     ASA 3       Induction: intravenous. Anesthetic plan and risks discussed with patient. Plan discussed with CRNA.                   Marquise Dinh MD   8/19/2021

## 2021-08-19 NOTE — OP NOTE
Operative Note      Patient: Ann Marie Lcay  YOB: 1970  MRN: [id number]    Date of Procedure: 2021    Pre-Op Diagnosis: SACROILLAC DYSFUNCTION, lumbosacral spondylosis    Post-Op Diagnosis: Same       Procedure(s):  LEFT S1, S2, S3 & L5 DORSAL RAMUS  BLOCK UNDER FLUOROSCOPY    Surgeon(s):  Annia Valentine MD    Assistant:   * No surgical staff found *    Anesthesia: Monitor Anesthesia Care    Estimated Blood Loss (mL): Minimal    Complications: None    Specimens:   * No specimens in log *    Implants:  * No implants in log *      Drains: * No LDAs found *    Findings: good needle placement    Detailed Description of Procedure:   2021    Patient: Ann Marie Lacy  :  1970  Age:  52 y.o. Sex:  male     PRE-OPERATIVE DIAGNOSIS: Sacroiliac joint dysfunction ,lumbar spondylosis    POST-OPERATIVE DIAGNOSIS: Same. PROCEDURE:  Fluoroscopic guided left S1, S2, S3 lateral branch and L5 dorsal ramus block    SURGEON: Annia Valentine MD    ANESTHESIA: MAC    ESTIMATED BLOOD LOSS: None.  ______________________________________________________________________  BRIEF HISTORY:  Ann Marie Lacy comes in today for the above procedure. the potential complications of this procedure were discussed with him again today. He has elected to undergo the aforementioned procedure. Mirza complete History & Physical examination were reviewed in depth, a copy of which is in the chart. DESCRIPTION OF PROCEDURE:   After confirming written and informed consent, a time-out was performed and Mirza name and date of birth, the procedure to be performed as well as the plan for the location of the needle insertion were confirmed. The patient was brought into the procedure room and placed in the prone position on the fluoroscopy table. Standard monitors were placed and vital signs were observed throughout the procedure.  The area of the lumbar spine and SI joint area was prepped with chloraprep and

## 2021-08-19 NOTE — H&P
release tablet Take 27 mg by mouth as needed. Yes Historical Provider, MD   glimepiride (AMARYL) 4 MG tablet Take 4 mg by mouth 2 times daily   Yes Historical Provider, MD       No Known Allergies    Social History     Socioeconomic History    Marital status:      Spouse name: Not on file    Number of children: Not on file    Years of education: Not on file    Highest education level: Not on file   Occupational History    Not on file   Tobacco Use    Smoking status: Never Smoker    Smokeless tobacco: Never Used   Vaping Use    Vaping Use: Never used   Substance and Sexual Activity    Alcohol use: Yes     Comment: Socially    Drug use: Never    Sexual activity: Not on file   Other Topics Concern    Not on file   Social History Narrative    Not on file     Social Determinants of Health     Financial Resource Strain:     Difficulty of Paying Living Expenses:    Food Insecurity:     Worried About Running Out of Food in the Last Year:     [address] in the Last Year:    Transportation Needs:     Lack of Transportation (Medical):      Lack of Transportation (Non-Medical):    Physical Activity:     Days of Exercise per Week:     Minutes of Exercise per Session:    Stress:     Feeling of Stress :    Social Connections:     Frequency of Communication with Friends and Family:     Frequency of Social Gatherings with Friends and Family:     Attends Christian Services:     Active Member of Clubs or Organizations:     Attends Club or Organization Meetings:     Marital Status:    Intimate Partner Violence:     Fear of Current or Ex-Partner:     Emotionally Abused:     Physically Abused:     Sexually Abused:        Family History   Adopted: Yes         REVIEW OF SYSTEMS:    CONSTITUTIONAL:  negative for  fevers, chills, sweats and fatigue    RESPIRATORY:  negative for  dry cough, cough with sputum, dyspnea, wheezing and chest pain    CARDIOVASCULAR:  negative for chest pain, dyspnea, palpitations, syncope    GASTROINTESTINAL:  negative for nausea, vomiting, change in bowel habits, diarrhea, constipation and abdominal pain    MUSCULOSKELETAL: negative for muscle weakness    SKIN: negative for itching or rashes. BEHAVIOR/PSYCH:  negative for poor appetite, increased appetite, decreased sleep and poor concentration    All other systems negative      PHYSICAL EXAM:    VITALS:  /74   Pulse 81   Resp 18   Ht 6' 1\" (1.854 m)   Wt 285 lb (129.3 kg)   SpO2 97%   BMI 37.60 kg/m²     CONSTITUTIONAL:  awake, alert, cooperative, no apparent distress, and appears stated age    EYES: PERRLA, EOMI    LUNGS:  No increased work of breathing, no audible wheezing    CARDIOVASCULAR:  regular rate and rhythm    ABDOMEN:  Soft non tender non distended     EXTREMITIES: no signs of clubbing or cyanosis. MUSCULOSKELETAL: negative for flaccid muscle tone or spastic movements. SKIN: gross examination reveals no signs of rashes, or diaphoresis. NEURO: Cranial nerves II-XII grossly intact. No signs of agitated mood. Assessment/Plan:    Patient  is here for sacral lateral branch block / l5 dr block  for low back pain. The patient was counseled at length about the risks of suri Covid-19 during their perioperative period and any recovery window from their procedure. The patient was made aware that suri Covid-19  may worsen their prognosis for recovering from their procedure  and lend to a higher morbidity and/or mortality risk. All material risks, benefits, and reasonable alternatives including postponing the procedure were discussed. The patient does wish to proceed with the procedure at this time.       Joce Parekh MD

## 2021-08-20 NOTE — ANESTHESIA POSTPROCEDURE EVALUATION
Department of Anesthesiology  Postprocedure Note    Patient: Adeola Tolliver  MRN: [id number]  YOB: 1970  Date of evaluation: 8/20/2021  Time:  10:04 AM     Procedure Summary     Date: 08/19/21 Room / Location: [facility] PROCEDURE ROOM 02 / [facility]    Anesthesia Start: 8736 Anesthesia Stop: 4039    Procedure: LEFT S1, S2, S3 & L5 DORSAL RAMUS  BLOCK UNDER FLUOROSCOPY (Left Back) Diagnosis: (Ilona Puls)    Surgeons: Yogi Cao MD Responsible Provider: Gi Lang MD    Anesthesia Type: MAC ASA Status: 3          Anesthesia Type: MAC    Reyna Phase I: Reyna Score: 10    Reyna Phase II: Reyna Score: 10    Last vitals: Reviewed and per EMR flowsheets.        Anesthesia Post Evaluation    Patient location during evaluation: PACU  Patient participation: complete - patient participated  Level of consciousness: awake and alert  Airway patency: patent  Nausea & Vomiting: no vomiting and no nausea  Complications: no  Cardiovascular status: hemodynamically stable  Respiratory status: acceptable  Hydration status: stable

## 2021-08-23 ENCOUNTER — APPOINTMENT (OUTPATIENT)
Dept: GENERAL RADIOLOGY | Age: 51
End: 2021-08-23
Payer: COMMERCIAL

## 2021-08-23 ENCOUNTER — APPOINTMENT (OUTPATIENT)
Dept: CT IMAGING | Age: 51
End: 2021-08-23
Payer: COMMERCIAL

## 2021-08-23 ENCOUNTER — HOSPITAL ENCOUNTER (EMERGENCY)
Age: 51
Discharge: HOME | End: 2021-08-23
Attending: EMERGENCY MEDICINE
Payer: COMMERCIAL

## 2021-08-23 VITALS
HEART RATE: 64 BPM | DIASTOLIC BLOOD PRESSURE: 72 MMHG | HEIGHT: 73 IN | BODY MASS INDEX: 37.77 KG/M2 | WEIGHT: 285 LBS | OXYGEN SATURATION: 98 % | RESPIRATION RATE: 15 BRPM | TEMPERATURE: 97.7 F | SYSTOLIC BLOOD PRESSURE: 122 MMHG

## 2021-08-23 DIAGNOSIS — H81.10 BENIGN PAROXYSMAL POSITIONAL VERTIGO, UNSPECIFIED LATERALITY: Primary | ICD-10-CM

## 2021-08-23 DIAGNOSIS — R00.2 PALPITATIONS: ICD-10-CM

## 2021-08-23 LAB
ALBUMIN SERPL-MCNC: 4.4 G/DL (ref 3.5–5.2)
ALP BLD-CCNC: 74 U/L (ref 40–129)
ALT SERPL-CCNC: 30 U/L (ref 0–40)
ANION GAP SERPL CALCULATED.3IONS-SCNC: 10 MMOL/L (ref 7–16)
AST SERPL-CCNC: 20 U/L (ref 0–39)
BACTERIA: NORMAL /HPF
BASOPHILS ABSOLUTE: 0.08 E9/L (ref 0–0.2)
BASOPHILS RELATIVE PERCENT: 0.6 % (ref 0–2)
BILIRUB SERPL-MCNC: 0.6 MG/DL (ref 0–1.2)
BILIRUBIN URINE: NEGATIVE
BLOOD, URINE: NEGATIVE
BUN BLDV-MCNC: 17 MG/DL (ref 6–20)
CALCIUM SERPL-MCNC: 9.5 MG/DL (ref 8.6–10.2)
CHLORIDE BLD-SCNC: 103 MMOL/L (ref 98–107)
CHP ED QC CHECK: NORMAL
CLARITY: CLEAR
CO2: 26 MMOL/L (ref 22–29)
COLOR: YELLOW
CREAT SERPL-MCNC: 1.2 MG/DL (ref 0.7–1.2)
D DIMER: 1710 NG/ML DDU
EKG ATRIAL RATE: 66 BPM
EKG P AXIS: 34 DEGREES
EKG P-R INTERVAL: 158 MS
EKG Q-T INTERVAL: 388 MS
EKG QRS DURATION: 86 MS
EKG QTC CALCULATION (BAZETT): 406 MS
EKG R AXIS: -23 DEGREES
EKG T AXIS: 5 DEGREES
EKG VENTRICULAR RATE: 66 BPM
EOSINOPHILS ABSOLUTE: 0.09 E9/L (ref 0.05–0.5)
EOSINOPHILS RELATIVE PERCENT: 0.7 % (ref 0–6)
GFR AFRICAN AMERICAN: >60
GFR NON-AFRICAN AMERICAN: >60 ML/MIN/1.73
GLUCOSE BLD-MCNC: 90 MG/DL (ref 74–99)
GLUCOSE BLD-MCNC: 92 MG/DL
GLUCOSE URINE: NEGATIVE MG/DL
HCT VFR BLD CALC: 48.9 % (ref 37–54)
HEMOGLOBIN: 16.4 G/DL (ref 12.5–16.5)
IMMATURE GRANULOCYTES #: 0.19 E9/L
IMMATURE GRANULOCYTES %: 1.4 % (ref 0–5)
KETONES, URINE: NEGATIVE MG/DL
LACTIC ACID, SEPSIS: 0.9 MMOL/L (ref 0.5–1.9)
LEUKOCYTE ESTERASE, URINE: NEGATIVE
LYMPHOCYTES ABSOLUTE: 2.58 E9/L (ref 1.5–4)
LYMPHOCYTES RELATIVE PERCENT: 19.2 % (ref 20–42)
MCH RBC QN AUTO: 31.1 PG (ref 26–35)
MCHC RBC AUTO-ENTMCNC: 33.5 % (ref 32–34.5)
MCV RBC AUTO: 92.8 FL (ref 80–99.9)
METER GLUCOSE: 92 MG/DL (ref 74–99)
MONOCYTES ABSOLUTE: 1.47 E9/L (ref 0.1–0.95)
MONOCYTES RELATIVE PERCENT: 10.9 % (ref 2–12)
NEUTROPHILS ABSOLUTE: 9.03 E9/L (ref 1.8–7.3)
NEUTROPHILS RELATIVE PERCENT: 67.2 % (ref 43–80)
NITRITE, URINE: NEGATIVE
PDW BLD-RTO: 13.6 FL (ref 11.5–15)
PH UA: 7 (ref 5–9)
PLATELET # BLD: 324 E9/L (ref 130–450)
PMV BLD AUTO: 9.3 FL (ref 7–12)
POTASSIUM SERPL-SCNC: 5.1 MMOL/L (ref 3.5–5)
PRO-BNP: 101 PG/ML (ref 0–125)
PROTEIN UA: 30 MG/DL
RBC # BLD: 5.27 E12/L (ref 3.8–5.8)
RBC UA: NORMAL /HPF (ref 0–2)
SODIUM BLD-SCNC: 139 MMOL/L (ref 132–146)
SPECIFIC GRAVITY UA: 1.01 (ref 1–1.03)
TOTAL PROTEIN: 8.5 G/DL (ref 6.4–8.3)
TROPONIN, HIGH SENSITIVITY: 6 NG/L (ref 0–11)
TROPONIN, HIGH SENSITIVITY: 7 NG/L (ref 0–11)
UROBILINOGEN, URINE: 0.2 E.U./DL
WBC # BLD: 13.4 E9/L (ref 4.5–11.5)
WBC UA: NORMAL /HPF (ref 0–5)

## 2021-08-23 PROCEDURE — 85025 COMPLETE CBC W/AUTO DIFF WBC: CPT

## 2021-08-23 PROCEDURE — 2580000003 HC RX 258: Performed by: EMERGENCY MEDICINE

## 2021-08-23 PROCEDURE — 6360000004 HC RX CONTRAST MEDICATION: Performed by: RADIOLOGY

## 2021-08-23 PROCEDURE — 83880 ASSAY OF NATRIURETIC PEPTIDE: CPT

## 2021-08-23 PROCEDURE — 84484 ASSAY OF TROPONIN QUANT: CPT

## 2021-08-23 PROCEDURE — 83605 ASSAY OF LACTIC ACID: CPT

## 2021-08-23 PROCEDURE — 70450 CT HEAD/BRAIN W/O DYE: CPT

## 2021-08-23 PROCEDURE — 82962 GLUCOSE BLOOD TEST: CPT

## 2021-08-23 PROCEDURE — 71045 X-RAY EXAM CHEST 1 VIEW: CPT

## 2021-08-23 PROCEDURE — 85378 FIBRIN DEGRADE SEMIQUANT: CPT

## 2021-08-23 PROCEDURE — 99285 EMERGENCY DEPT VISIT HI MDM: CPT

## 2021-08-23 PROCEDURE — 93005 ELECTROCARDIOGRAM TRACING: CPT | Performed by: EMERGENCY MEDICINE

## 2021-08-23 PROCEDURE — 81001 URINALYSIS AUTO W/SCOPE: CPT

## 2021-08-23 PROCEDURE — 71275 CT ANGIOGRAPHY CHEST: CPT

## 2021-08-23 PROCEDURE — 6370000000 HC RX 637 (ALT 250 FOR IP): Performed by: EMERGENCY MEDICINE

## 2021-08-23 PROCEDURE — 80053 COMPREHEN METABOLIC PANEL: CPT

## 2021-08-23 RX ORDER — MECLIZINE HYDROCHLORIDE 25 MG/1
25 TABLET ORAL 3 TIMES DAILY PRN
Qty: 15 TABLET | Refills: 0 | Status: SHIPPED | OUTPATIENT
Start: 2021-08-23 | End: 2021-09-02

## 2021-08-23 RX ORDER — 0.9 % SODIUM CHLORIDE 0.9 %
1000 INTRAVENOUS SOLUTION INTRAVENOUS ONCE
Status: COMPLETED | OUTPATIENT
Start: 2021-08-23 | End: 2021-08-23

## 2021-08-23 RX ORDER — MECLIZINE HCL 12.5 MG/1
25 TABLET ORAL ONCE
Status: COMPLETED | OUTPATIENT
Start: 2021-08-23 | End: 2021-08-23

## 2021-08-23 RX ADMIN — MECLIZINE 25 MG: 12.5 TABLET ORAL at 13:15

## 2021-08-23 RX ADMIN — SODIUM CHLORIDE 1000 ML: 9 INJECTION, SOLUTION INTRAVENOUS at 13:20

## 2021-08-23 RX ADMIN — IOPAMIDOL 75 ML: 755 INJECTION, SOLUTION INTRAVENOUS at 15:20

## 2021-08-23 NOTE — ED PROVIDER NOTES
HPI:  8/23/21,   Time: 12:27 PM EDT       Susanna Marks is a 52 y.o. male presenting to the ED for dizziness, palpitations and shortness of breath, beginning 4 days ago. The complaint has been intermittent, mild in severity, and worsened by deep breath. Patient is a 56-year-old gentleman insulin-dependent diabetic who is a history of chronic back and chronic pain issues. He states that he had spinal injection for pain done 5 days ago. States he felt a little \"woozy\" after the anesthesia for the procedure, went home woke up the next day and states he just does not quite \"feel right\" he states he is not have any back pain or radicular symptoms no fevers or chills. \"The back feels fine\". He states that ever since the procedure he feels he has occasional palpitations occasionally feels some trouble taking a full deep breath. No shortness of breath or dyspnea on exertion. No chest pain. He feels increased palpitations with exertion. No heavy chest pressure. No back pain no numbness or weakness to extremities no syncope. He states when he stands he feels like \"his balance is off\" it feels like when he moves his head from side to side that he feels the room spinning slightly this makes his balance feel off. He is able to walk into triage line. .  No ataxia. No visual changes. No numbness or weakness extremities no aphasia no dysarthria. No bowel or bladder incontinence no fevers or chills. No aphasia or dysarthria or facial droop. No headache. Review of Systems:   Pertinent positives and negatives are stated within HPI, all other systems reviewed and are negative.          --------------------------------------------- PAST HISTORY ---------------------------------------------  Past Medical History:  has a past medical history of Acid reflux, ADHD, DM (diabetes mellitus) ([facility] [address] Low back pain, and Protein in urine.     Past Surgical History:  has a past surgical history that includes Patella surgery (Right); Upper gastrointestinal endoscopy (10/2020); Endoscopy, colon, diagnostic; Nerve Block (Left, 6/8/2021); Pain management procedure (Left, 7/1/2021); and Nerve Block (Left, 8/19/2021). Social History:  reports that he has never smoked. He has never used smokeless tobacco. He reports current alcohol use. He reports that he does not use drugs. Family History: family history is not on file. He was adopted. The patients home medications have been reviewed. Allergies: Patient has no known allergies. ---------------------------------------------------PHYSICAL EXAM--------------------------------------    Constitutional/General: Alert and oriented x3, well appearing, non toxic in NAD;overweight  Head: Normocephalic and atraumatic  Eyes: PERRL, EOMI, conjunctive normal, sclera non icteric  Mouth: Oropharynx clear, handling secretions, no trismus, no asymmetry of the posterior oropharynx or uvular edema  Neck: Supple, full ROM, non tender to palpation in the midline, no stridor, no crepitus, no meningeal signs  Respiratory: Lungs clear to auscultation bilaterally, no wheezes, rales, or rhonchi. Not in respiratory distress  Cardiovascular:  Regular rate. Regular rhythm. No murmurs, gallops, or rubs. 2+ distal pulses  Chest: No chest wall tenderness  GI:  Abdomen Soft, Non tender, Non distended. +BS. No organomegaly, no palpable masses,  No rebound, guarding, or rigidity. Musculoskeletal: Moves all extremities x 4. Warm and well perfused, no clubbing, cyanosis, or edema. Capillary refill <3 seconds; midline tenderness to the lumbar spine. No redness or swelling. Normal strength and sensation of bilateral lower extremities normal dorsalis pedis and posterior tibial pulses to bilateral lower extremities. Integument: skin warm and dry. No rashes.    Lymphatic: no lymphadenopathy noted  Neurologic: GCS 15, no focal deficits, symmetric strength 5/5 in the upper and lower extremities bilaterally; no Straw/Yellow    Clarity, UA Clear Clear    Glucose, Ur Negative Negative mg/dL    Bilirubin Urine Negative Negative    Ketones, Urine Negative Negative mg/dL    Specific Gravity, UA 1.015 1.005 - 1.030    Blood, Urine Negative Negative    pH, UA 7.0 5.0 - 9.0    Protein, UA 30 (A) Negative mg/dL    Urobilinogen, Urine 0.2 <2.0 E.U./dL    Nitrite, Urine Negative Negative    Leukocyte Esterase, Urine Negative Negative   D-Dimer, Quantitative   Result Value Ref Range    D-Dimer, Quant 1710 ng/mL DDU   Brain Natriuretic Peptide   Result Value Ref Range    Pro- 0 - 125 pg/mL   Lactate, Sepsis   Result Value Ref Range    Lactic Acid, Sepsis 0.9 0.5 - 1.9 mmol/L   Troponin   Result Value Ref Range    Troponin, High Sensitivity 6 0 - 11 ng/L   Microscopic Urinalysis   Result Value Ref Range    WBC, UA NONE 0 - 5 /HPF    RBC, UA 0-1 0 - 2 /HPF    Bacteria, UA NONE SEEN None Seen /HPF   POCT Glucose   Result Value Ref Range    Glucose 92 mg/dL    QC OK? ok    POCT Glucose   Result Value Ref Range    Meter Glucose 92 74 - 99 mg/dL   EKG 12 Lead   Result Value Ref Range    Ventricular Rate 66 BPM    Atrial Rate 66 BPM    P-R Interval 158 ms    QRS Duration 86 ms    Q-T Interval 388 ms    QTc Calculation (Bazett) 406 ms    P Axis 34 degrees    R Axis -23 degrees    T Axis 5 degrees       RADIOLOGY:  Interpreted by Radiologist.  CTA PULMONARY W CONTRAST   Final Result   No evidence of pulmonary embolism or acute pulmonary abnormality. CT HEAD WO CONTRAST   Final Result   No acute intracranial abnormality. Specifically, there is no acute   intracranial hemorrhage. XR CHEST PORTABLE   Final Result   No acute process. EKG:  Patient had an EKG that showed sinus rhythm at 66 bpm no signs of any ST changes. QTc 406.   Patient has chronically inverted T wave in lead III this is seen on previous EKG no change from previous EKG EKG interpreted by myself.    ------------------------- NURSING NOTES AND pain with deep breathing and occasional palpitations and dizziness. No ataxia no focal neuro deficits on exam.  Appears clinically well he is been eating and drinking normally no nausea vomiting or diarrhea. Will have orthostatics we will check lab work CT of the head      Differential diagnosis includes dizziness orthostatic hypotension vertigo PE dysrhythmia dehydration    This patient has remained hemodynamically stable during their ED course. EKG shows sinus rhythm at 66 bpm no signs of any ST changes. First troponin was 7-second opponent was 6 delta of only 1 no chest pain or shortness of breath while here in the department. CBC was normal white count was 13.4 chemistry was normal. Urinalysis negative for acute infection D-dimer is positive at 1710 BNP was normal lactic acid was normal.  Patient had CTA of the chest that showed no PE. No acute cardiopulmonary abnormality CT of the head showed no acute process  Chest x-ray showed no acute process. Patient is very eager for discharge home he ambulated in part with steady gait no ataxia normal neurologic exam; NIH stroke scale was 0. She does per his report some mild head spinning sensation with sudden head movements. This has improved with meclizine. He is eager for discharge home. He will follow-up outpatient with PCP as well as neurology started on meclizine and return to the ER for any worsening symptoms. Patient stable for discharge. Re-Evaluations:             Re-evaluation. Patients symptoms are improving    Re-examination  8/23/21   12:27 PM EDT          Vital Signs:   Vitals:    08/23/21 1013 08/23/21 1325 08/23/21 1442 08/23/21 1807   BP: 125/89 132/74 119/67 122/72   Pulse: 92 70 67 64   Resp: 14  16 15   Temp: 97.7 °F (36.5 °C)      TempSrc: Temporal      SpO2: 99%  98% 98%   Weight: 285 lb (129.3 kg)      Height: 6' 1\" (1.854 m)            Counseling:    The emergency provider has spoken with the patient and discussed todays results, in addition to providing specific details for the plan of care and counseling regarding the diagnosis and prognosis. Questions are answered at this time and they are agreeable with the plan.       --------------------------------- IMPRESSION AND DISPOSITION ---------------------------------    IMPRESSION  1. Benign paroxysmal positional vertigo, unspecified laterality    2. Palpitations        DISPOSITION  Disposition: Discharge to home  Patient condition is fair    NOTE: This report was transcribed using voice recognition software.  Every effort was made to ensure accuracy; however, inadvertent computerized transcription errors may be present       Carlie Zapata MD  08/23/21 2942       Carlie Zapata MD  08/23/21 1823

## 2021-08-23 NOTE — ED NOTES
FIRST PROVIDER CONTACT ASSESSMENT NOTE      Department of Emergency Medicine   8/23/21  10:12 AM EDT    Chief Complaint: Shortness of Breath (feels like his heart is fluttering, sob, had a procedure done on thursday had nerve blocks in his back)      History of Present Illness:   Katherine Prieto is a 52 y.o. male who presents to the ED for fluttering in his chest and unsteady gait since a nerve block to lower back on Thursday. Has chronic back problems. States when he stands up he feels unsteady. Feels SOB when he talks too much. Denies asthma, COPD, or smoking. Denies head injury, blood thinner use, or chest pain. Patient states he is a diabetic but denies any other significant medical history. He denies hitting his head or loss of consciousness. Medical History:  has a past medical history of Acid reflux, ADHD, DM (diabetes mellitus) ([address] Low back pain, and Protein in urine. Surgical History:  has a past surgical history that includes Patella surgery (Right); Upper gastrointestinal endoscopy (10/2020); Endoscopy, colon, diagnostic; Nerve Block (Left, 6/8/2021); Pain management procedure (Left, 7/1/2021); and Nerve Block (Left, 8/19/2021). Social History:  reports that he has never smoked. He has never used smokeless tobacco. He reports current alcohol use. He reports that he does not use drugs. Family History: family history is not on file. He was adopted. *ALLERGIES*     Patient has no known allergies.      Physical Exam:      VS:  /89   Pulse 92   Temp 97.7 °F (36.5 °C) (Temporal)   Resp 14   Ht 6' 1\" (1.854 m)   Wt 285 lb (129.3 kg)   SpO2 99%   BMI 37.60 kg/m²      Cardiac: Regular rate and rhythm   Respiratory: Not in respiratory distress; no wheezes, rhonchi or stridor; lungs clear to auscultation bilaterally  Patient ambulating with steady gait    Initial Plan of Care:  Initiate Treatment-Testing, Proceed toTreatment Area When Bed Available for ED Attending/MLP to Continue Care    Patient told to notify us for new or worsening symptoms    -----------------END OF FIRST PROVIDER CONTACT ASSESSMENT NOTE--------------  Electronically signed by Domenica Bradley PA-C   DD: 8/23/21             Dara Tan PA-C  08/23/21 1017

## 2021-10-14 ENCOUNTER — OFFICE VISIT (OUTPATIENT)
Dept: PAIN MANAGEMENT | Age: 51
End: 2021-10-14
Payer: COMMERCIAL

## 2021-10-14 ENCOUNTER — PREP FOR PROCEDURE (OUTPATIENT)
Dept: PAIN MANAGEMENT | Age: 51
End: 2021-10-14

## 2021-10-14 VITALS
SYSTOLIC BLOOD PRESSURE: 124 MMHG | DIASTOLIC BLOOD PRESSURE: 68 MMHG | RESPIRATION RATE: 16 BRPM | HEART RATE: 84 BPM | BODY MASS INDEX: 37.77 KG/M2 | HEIGHT: 73 IN | OXYGEN SATURATION: 98 % | TEMPERATURE: 98.1 F | WEIGHT: 285 LBS

## 2021-10-14 DIAGNOSIS — M47.817 LUMBOSACRAL SPONDYLOSIS WITHOUT MYELOPATHY: ICD-10-CM

## 2021-10-14 DIAGNOSIS — E66.9 OBESITY, UNSPECIFIED CLASSIFICATION, UNSPECIFIED OBESITY TYPE, UNSPECIFIED WHETHER SERIOUS COMORBIDITY PRESENT: ICD-10-CM

## 2021-10-14 DIAGNOSIS — M53.3 SACROILIAC DYSFUNCTION: Primary | ICD-10-CM

## 2021-10-14 DIAGNOSIS — M51.36 DDD (DEGENERATIVE DISC DISEASE), LUMBAR: ICD-10-CM

## 2021-10-14 PROCEDURE — 1036F TOBACCO NON-USER: CPT | Performed by: ANESTHESIOLOGY

## 2021-10-14 PROCEDURE — 99213 OFFICE O/P EST LOW 20 MIN: CPT | Performed by: ANESTHESIOLOGY

## 2021-10-14 PROCEDURE — G8484 FLU IMMUNIZE NO ADMIN: HCPCS | Performed by: ANESTHESIOLOGY

## 2021-10-14 PROCEDURE — G8427 DOCREV CUR MEDS BY ELIG CLIN: HCPCS | Performed by: ANESTHESIOLOGY

## 2021-10-14 PROCEDURE — G8417 CALC BMI ABV UP PARAM F/U: HCPCS | Performed by: ANESTHESIOLOGY

## 2021-10-14 RX ORDER — NABUMETONE 750 MG/1
750 TABLET, FILM COATED ORAL 2 TIMES DAILY PRN
Qty: 40 TABLET | Refills: 0 | Status: SHIPPED
Start: 2021-10-14 | End: 2022-05-16 | Stop reason: ALTCHOICE

## 2021-10-14 NOTE — PROGRESS NOTES
Do you currently have any of the following:    Fever: No  Headache:  No  Cough: No  Shortness of breath: No  Exposed to anyone with these symptoms: Lubna Lam TONI Henao presents to the [address] [place] [address] on 10/14/2021. Luna Jeffers is complaining of pain lower back/left hip  The pain is constant. The pain is described as aching, throbbing, shooting and stabbing. Pain is rated on his best day at a 5, on his worst day at a 10, and on average at a 8 on the VAS scale. He took his last dose of Relafen     Any procedures since your last visit:     Pacemaker or defibrillator: No managed by     He is  on NSAIDS and is not on anticoagulation medications to include none and is managed by      Medication Contract and Consent for Opioid Use Documents Filed      No documents found                /68   Pulse 84   Temp 98.1 °F (36.7 °C) (Infrared)   Resp 16   Ht 6' 1\" (1.854 m)   Wt 285 lb (129.3 kg)   SpO2 98%   BMI 37.60 kg/m²      No LMP for male patient.

## 2021-10-14 NOTE — PROGRESS NOTES
[facility]  Hua, Shannon Villanueva  Dept: [phone]    Follow up Note      Marisa Henao     Date of Visit:  10/14/2021     CC:  Patient presents for follow up   Chief Complaint   Patient presents with    Follow-up     lower back /left hip      HPI:  Chronic low back pain.     Started > a year ago after he felt a pop while he was stopping a trailer in Nov 2019.     Pain over the left low back and radiates to the left groin.     Tried multiple modalities of treatment including -meds/ chiropractic treatment, PT.     Has been evaluated by NSG and referred for interventions. Nursing notes and details of the pain history reviewed. Please see intake notes for details.     Previous treatments:   Physical Therapy / HEP: yes,       Chiropractic treatment: yes, for > 2 months recently     Medications: - NSAID's : yes                       - Membrane stabilizers : yes- gabapentin                       - Opioids : not on chronic opioids                       - Adjuvants or Others : yes     TENS Unit: no     Surgeries: no LS or hip surgery      He has not been on anticoagulation medications      He has not been on herbal supplements.       He is diabetic.     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     Imaging:   MRI of LS spine: 4/6/2021:         X- ray Pelvis: 7/16/2020:      FINDINGS:   No fracture or dislocation seen. Bone mineralization is normal for   age. There is joint space narrowing in the bilateral hips. Nonobstructed bowel gas pattern.  Multiple pelvic phleboliths.           Impression   Moderate degenerative changes in the bilateral hips.                Potential Aberrant Drug-Related Behavior: no     Urine Drug Screening: no    OARRS report[de-identified]  reviewed today- consistent    Past Medical History:   Diagnosis Date    Acid reflux     ADHD     DM (diabetes mellitus) (HCC)     Low back pain     Protein in urine        Past Surgical History:   Procedure Laterality Date    ENDOSCOPY, COLON, DIAGNOSTIC      NERVE BLOCK Left 6/8/2021    LEFT LUMBAR MEDIAL BRANCH NERVE BLOCK UNDER FLUOROSCOPIC GUIDANCE AT L2, L3, L4 AND L5 DORSAL RAMI WITH SEDATION (CPT 25654) [facility] 8/19/2021    LEFT S1, S2, S3 & L5 DORSAL RAMUS  BLOCK UNDER FLUOROSCOPY performed by Melba Barreto MD at [address] Left 7/1/2021    LUMBAR EPIDURAL STEROID INJECTION UNDER FLUOROSCOPIC GUIDANCE AT L4-L5 LEFT PARAMEDIAN LEFT SACROILIAC JOINT INJECTION performed by Melba Barreto MD at [address] [place] [address] Right     UPPER GASTROINTESTINAL ENDOSCOPY  10/2020       Prior to Admission medications    Medication Sig Start Date End Date Taking? Authorizing Provider   nabumetone (RELAFEN) 750 MG tablet Take 1 tablet by mouth 2 times daily as needed for Pain 8/2/21  Yes Melba Barreto MD   LANTUS SOLOSTAR 100 UNIT/ML injection pen Inject 32 Units into the skin nightly  6/7/21  Yes Historical Provider, MD   Lidocaine 1.8 % PTCH Apply patch 12 hours and remove for 12 hours 6/24/21  Yes Melba Barreto MD   TRULICITY 1.5 ZZ/0.3KY SOPN 1.5 mg once a week  10/16/20  Yes Historical Provider, MD   pioglitazone (ACTOS) 15 MG tablet take 1 tablet by mouth once daily 10/14/20  Yes Historical Provider, MD   pantoprazole (PROTONIX) 40 MG tablet Take 40 mg by mouth daily    Yes Historical Provider, MD   ALPRAZolam Valaria Paling) 0.5 MG tablet as needed. Yes Historical Provider, MD   methylphenidate (CONCERTA) 27 MG extended release tablet Take 27 mg by mouth as needed.     Yes Historical Provider, MD   glimepiride (AMARYL) 4 MG tablet Take 4 mg by mouth 2 times daily   Yes Historical Provider, MD       No Known Allergies    Social History     Socioeconomic History    Marital status:      Spouse name: Not on file    Number of children: Not on file    Years of education: Not on file    Highest education level: Not on file   Occupational History    Not on file   Tobacco Use    Smoking status: Never Smoker    Smokeless tobacco: Never Used   Vaping Use    Vaping Use: Never used   Substance and Sexual Activity    Alcohol use: Yes     Comment: Socially    Drug use: Never    Sexual activity: Yes     Partners: Female   Other Topics Concern    Not on file   Social History Narrative    Not on file     Social Determinants of Health     Financial Resource Strain:     Difficulty of Paying Living Expenses:    Food Insecurity:     Worried About Running Out of Food in the Last Year:     [address] in the Last Year:    Transportation Needs:     Lack of Transportation (Medical):  Lack of Transportation (Non-Medical):    Physical Activity:     Days of Exercise per Week:     Minutes of Exercise per Session:    Stress:     Feeling of Stress :    Social Connections:     Frequency of Communication with Friends and Family:     Frequency of Social Gatherings with Friends and Family:     Attends Judaism Services:     Active Member of Clubs or Organizations:     Attends Club or Organization Meetings:     Marital Status:    Intimate Partner Violence:     Fear of Current or Ex-Partner:     Emotionally Abused:     Physically Abused:     Sexually Abused:        Family History   Adopted: Yes       REVIEW OF SYSTEMS:     Odalysel Felt denies fever/chills, chest pain, shortness of breath, new bowel or bladder complaints. All other review of systems was negative.     PHYSICAL EXAMINATION:      /68   Pulse 84   Temp 98.1 °F (36.7 °C) (Infrared)   Resp 16   Ht 6' 1\" (1.854 m)   Wt 285 lb (129.3 kg)   SpO2 98%   BMI 37.60 kg/m²   General:       General appearance:  Pleasant and well-hydrated, in no distress and A & O x 3  Build:Obese  Function: Rises from seated position easily and Moves about room without difficulty     HEENT:     Head:normocephalic, atraumatic       Lungs:     Breathing:normal breathing pattern      CVS:     RRR     Abdomen:     Shape:obese, non-distended and normal     Cervical spine:     Inspection:normal     Thoracic spine:                Spine inspection:normal      Lumbar spine:     Spine inspection: Normal   Palpation: Tenderness paravertebral muscles Yes left  Range of motion: Decreased, flexion Decreased, Lateral bending, extension and rotation bilaterally reduced is painful. Lumbar facet loading + left  Sacroiliac joint tenderness Yes left  Gaenslen's + left, Nella's + left  Piriformis tenderness: negative bilaterally  SLR : negative bilaterally  Trochanteric bursa tenderness: positive left  CVA tenderness:No      Musculoskeletal:     Trigger points no     Extremities:     Tremors:None bilaterally upper and lower  Edema:none x all 4 extremities     Neurological:     Sensory: Normal to light touch      Motor:   Right  5/5              Left  5/5               Right Bicep 5/5           Left Bicep 5/5              Right Triceps 5/5       Left Triceps 5/5          Right Deltoid 5/5     Left Deltoid 5/5                  Right Quadriceps 5/5          Left Quadriceps 5/5           Right Gastrocnemius 5/5    Left Gastrocnemius 5/5  Right Ant Tibialis 5/5  Left Ant Tibialis 5/5     Reflexes:    B/l equal reduced     Gait:normal Yes     Dermatology:     Skin:no rashes or lesions noted    Assessment/Plan:     Diagnosis Orders   1. Lumbosacral spondylosis without myelopathy      2. DDD (degenerative disc disease), lumbar      3. Sacroiliac dysfunction      4.  Obesity, unspecified classification, unspecified obesity type, unspecified whether serious comorbidity present            52 y.o. male with h/o low back pain for > a year following an injury.     Pain over the left low back - predominantly axial in nature.     S/P Lumbar facet MBNB- not much relief.     Failed > 6 weeks of conservative treatment recently.     MRI: of LS spine reviewed. He had CD reviewed personally.     Has been evaluated by NSG and referred for interventions. Diagnostic lumbar facet medial branch block did not provide significant pain relief. S/P LESI + left SIJ injection > 80% relief for 2 weeks. S/P left S1, S2, S3 & L5 DR block with > 80% relief. Now has recurrence of pain but not as intense. Continues to do HEP.    Plan: Will do Radiofrequency ablation of  left S1, S2, S3 & L5 DR block under fluoroscopy. RBA discussed. Will do with moderate sedation. Gabapentin - caused drowsiness. Has been D/ C'd. Muscle relaxant for prn us. Short course of Relafen for prn use # 40. Use instructions reviewed (Caution for long term use). (BUN/ creatinine: N on 8/23/2021).     Counseling :Patient encouraged to stay active and to watch/lose weight and to do Regular home exercise program as tolerated - stretching / strengthening.     Treatment plan discussed with the patient including medication and procedure side effects.     Controlled Substances Monitoring:      OARRS reviewed- not on chronic opioids.     We discussed with the patient that combining opioids, benzodiazepines, alcohol, illicit drugs or sleep aids increases the risk of respiratory depression including death. We discussed that these medications may cause drowsiness, sedation or dizziness and have counseled the patient not to drive or operate machinery. We have discussed that these medications will be prescribed only by one provider. We have discussed with the patient about age related risk factors and have thoroughly discussed the importance of taking these medications as prescribed.  The patient verbalizes understanding.     Sumi Holm MD    CC:  Ramiro Carmona MD

## 2021-12-06 ENCOUNTER — TELEPHONE (OUTPATIENT)
Dept: PAIN MANAGEMENT | Age: 51
End: 2021-12-06

## 2021-12-06 ENCOUNTER — PREP FOR PROCEDURE (OUTPATIENT)
Dept: PAIN MANAGEMENT | Age: 51
End: 2021-12-06

## 2021-12-06 DIAGNOSIS — M53.3 SACROILIAC DYSFUNCTION: ICD-10-CM

## 2021-12-06 DIAGNOSIS — M47.817 LUMBOSACRAL SPONDYLOSIS WITHOUT MYELOPATHY: ICD-10-CM

## 2021-12-06 RX ORDER — HYDROCODONE BITARTRATE AND ACETAMINOPHEN 5; 325 MG/1; MG/1
1 TABLET ORAL 2 TIMES DAILY PRN
Qty: 14 TABLET | Refills: 0 | Status: SHIPPED | OUTPATIENT
Start: 2021-12-06 | End: 2021-12-13

## 2021-12-06 NOTE — TELEPHONE ENCOUNTER
Pt. Called and stated that something popped in his back. He currently is at a conference in [place] which he has to do a lot of walking. Pain radiates down his left leg and foot going numb. He is scheduled for a procedure on 12/13/2021. He would like Benitez Mann called into a  pharmacy to get him through this conference.

## 2021-12-09 NOTE — PROGRESS NOTES
Have you been tested for COVID  Yes           Have you been told you were positive for COVID No  Have you had any known exposure to someone that is positive for COVID No  Do you have a cough                   No              Do you have shortness of breath No                 Do you have a sore throat            No                Are you having chills                    No                Are you having muscle aches. No                    Please come to the hospital wearing a mask and have your significant other wear a mask as well. Both of you should check your temperature before leaving to come here,  if it is 100 or higher please call [phone] for instruction. [facility] PAIN MANAGEMENT  INSTRUCTIONS  . .......................................................................................................................................... [x] Parking the day of Surgery is located in the [facility].   Upon entering the door, make immediate right into the surgery reception room    [x]  Bring photo ID and insurance card     [x] You may have a light breakfast day of procedure    [x]  Wear loose comfortable clothing    [x]  Please follow instructions for medications as given per Dr's office    [x] You can expect a call the business day prior to procedure to notify you of your arrival time     [x] Please arrange for     []  Other instructions

## 2021-12-13 ENCOUNTER — HOSPITAL ENCOUNTER (OUTPATIENT)
Age: 51
Setting detail: OUTPATIENT SURGERY
Discharge: HOME | End: 2021-12-13
Attending: ANESTHESIOLOGY | Admitting: ANESTHESIOLOGY
Payer: COMMERCIAL

## 2021-12-13 ENCOUNTER — APPOINTMENT (OUTPATIENT)
Dept: GENERAL RADIOLOGY | Age: 51
End: 2021-12-13
Attending: ANESTHESIOLOGY
Payer: COMMERCIAL

## 2021-12-13 VITALS
DIASTOLIC BLOOD PRESSURE: 76 MMHG | OXYGEN SATURATION: 98 % | WEIGHT: 285 LBS | HEART RATE: 79 BPM | RESPIRATION RATE: 16 BRPM | HEIGHT: 73 IN | SYSTOLIC BLOOD PRESSURE: 138 MMHG | BODY MASS INDEX: 37.77 KG/M2 | TEMPERATURE: 97.2 F

## 2021-12-13 DIAGNOSIS — R52 PAIN MANAGEMENT: ICD-10-CM

## 2021-12-13 PROCEDURE — 64635 DESTROY LUMB/SAC FACET JNT: CPT | Performed by: ANESTHESIOLOGY

## 2021-12-13 PROCEDURE — 3209999900 FLUORO FOR SURGICAL PROCEDURES

## 2021-12-13 PROCEDURE — 3600000012 HC SURGERY LEVEL 2 ADDTL 15MIN: Performed by: ANESTHESIOLOGY

## 2021-12-13 PROCEDURE — 6360000002 HC RX W HCPCS: Performed by: ANESTHESIOLOGY

## 2021-12-13 PROCEDURE — 64636 DESTROY L/S FACET JNT ADDL: CPT | Performed by: ANESTHESIOLOGY

## 2021-12-13 PROCEDURE — 2709999900 HC NON-CHARGEABLE SUPPLY: Performed by: ANESTHESIOLOGY

## 2021-12-13 PROCEDURE — 2500000003 HC RX 250 WO HCPCS: Performed by: ANESTHESIOLOGY

## 2021-12-13 PROCEDURE — 7100000011 HC PHASE II RECOVERY - ADDTL 15 MIN: Performed by: ANESTHESIOLOGY

## 2021-12-13 PROCEDURE — 7100000010 HC PHASE II RECOVERY - FIRST 15 MIN: Performed by: ANESTHESIOLOGY

## 2021-12-13 PROCEDURE — 3600000002 HC SURGERY LEVEL 2 BASE: Performed by: ANESTHESIOLOGY

## 2021-12-13 RX ORDER — LIDOCAINE HYDROCHLORIDE 5 MG/ML
INJECTION, SOLUTION INFILTRATION; INTRAVENOUS PRN
Status: DISCONTINUED | OUTPATIENT
Start: 2021-12-13 | End: 2021-12-13 | Stop reason: ALTCHOICE

## 2021-12-13 RX ORDER — LIDOCAINE HYDROCHLORIDE 10 MG/ML
INJECTION, SOLUTION EPIDURAL; INFILTRATION; INTRACAUDAL; PERINEURAL PRN
Status: DISCONTINUED | OUTPATIENT
Start: 2021-12-13 | End: 2021-12-13 | Stop reason: ALTCHOICE

## 2021-12-13 RX ORDER — METHYLPREDNISOLONE ACETATE 40 MG/ML
INJECTION, SUSPENSION INTRA-ARTICULAR; INTRALESIONAL; INTRAMUSCULAR; SOFT TISSUE PRN
Status: DISCONTINUED | OUTPATIENT
Start: 2021-12-13 | End: 2021-12-13 | Stop reason: ALTCHOICE

## 2021-12-13 RX ORDER — BUPIVACAINE HYDROCHLORIDE 2.5 MG/ML
INJECTION, SOLUTION EPIDURAL; INFILTRATION; INTRACAUDAL PRN
Status: DISCONTINUED | OUTPATIENT
Start: 2021-12-13 | End: 2021-12-13 | Stop reason: ALTCHOICE

## 2021-12-13 ASSESSMENT — PAIN DESCRIPTION - LOCATION: LOCATION: BACK;GROIN;HIP

## 2021-12-13 ASSESSMENT — PAIN DESCRIPTION - ORIENTATION: ORIENTATION: LOWER;LEFT

## 2021-12-13 ASSESSMENT — PAIN SCALES - GENERAL
PAINLEVEL_OUTOF10: 10

## 2021-12-13 ASSESSMENT — PAIN DESCRIPTION - PAIN TYPE
TYPE: CHRONIC PAIN
TYPE: CHRONIC PAIN

## 2021-12-13 NOTE — OP NOTE
Operative Note      Patient: Worley Dakin  YOB: 1970  MRN: [id number]    Date of Procedure: 2021    Pre-Op Diagnosis: LUMBOSACRAL SPONDYLOSIS, SACROILIAC DYSFUNCTION    Post-Op Diagnosis: Same       Procedure(s):  RADIOFREQUENCY ABLATION OF LEFT LUMBAR L4, L5, S1 UNDER FLUOROSCOPY     Surgeon(s):  Sara Brennan MD    Assistant:   * No surgical staff found *    Anesthesia: Local    Estimated Blood Loss (mL): Minimal    Complications: None    Specimens:   * No specimens in log *    Implants:  * No implants in log *      Drains: * No LDAs found *    Findings: good needle placement    Detailed Description of Procedure:   2021    Patient: Worley Dakin  :  1970  Age:  54 y.o. Sex:  male     PRE-OPERATIVE DIAGNOSIS: Left Lumbar spondylosis, lumbar facet arthropathy. POST-OPERATIVE DIAGNOSIS: Same. PROCEDURE:  Fluoroscopic-guided Left  Lumbar facet medial branch radiofrequency ablation at levels L4, L5 & S1.    SURGEON:  Sara Brennan MD    ANESTHESIA: Local    ESTIMATED BLOOD LOSS: None.  ______________________________________________________________________  HISTORY & PHYSICAL: Worley Dakin presents today for fluoroscopic-guided Left facet medial branch radiofrequency ablation. The potential complications of the procedure were explained to Genaro again today and he has elected to undergo the aforementioned procedure. Mirza complete History & Physical examination were reviewed in depth, a copy of which is in the chart. DESCRIPTION OF PROCEDURE:    After confirming written and informed consent, a time-out was performed and Mirza name and date of birth, the procedure to be performed as well as the plan for the location of the needle insertion were confirmed. The patient was brought into the procedure room and placed in the prone position on the fluoroscopy table. Standard monitors were placed and vital signs were observed throughout the procedure. The area of the lumbar spine and upper buttocks was sterilely prepped with chloraprep and draped in a sterile manner. AP fluoroscopy was used to identify and michelle bartons point at the targeted area. The # 20 gauge 10 mm radiofrequency probe was advanced toward each of these points at L4, L5 & S1. Additional needles were introduced at the lower levels. Once bone was contacted, negative aspiration for blood and CSF was confirmed, sensory stimulation was performed at 50 Hz and at 0.4 volts generating a pressure sensation. Motor stimulation < 2.0 volts elicited multifidus twitching without any radicular symptoms. 1 ml of 1% lidocaine was injected prior to lesioning at each level , which was performed for 90 seconds at 90 degrees centigrade. Once the lesions were complete, a solution of 0.25% marcaine 0.5-1 cc and 40 mg DepoMedrol was injected and distributed equally through each probe. The probes were removed. The patient's back was cleaned and bandages were placed over the needle insertion sites. Disposition the patient tolerated the procedure well and there were no complications . Vital signs remained stable throughout the procedure. The patient was escorted to the recovery area where they remained until discharge and written discharge instructions for the procedure were given. Plan: Kike Bates will return to our pain management center as scheduled.      Shane Lee MD

## 2021-12-13 NOTE — H&P
[facility]   Hua, [address] [facility]  Dept: [phone]    Procedure History & Physical      Genaro TONI Henao     HPI:    Patient  is here for lumbo- sacral  MB RFA for low back pain. Labs/imaging studies reviewed   All question and concerns addressed including R/B/A associated with the procedure    Past Medical History:   Diagnosis Date    Acid reflux     ADHD     Anxiety     DM (diabetes mellitus) ([address]     Low back pain     Protein in urine        Past Surgical History:   Procedure Laterality Date    ENDOSCOPY, COLON, DIAGNOSTIC      NERVE BLOCK Left 6/8/2021    LEFT LUMBAR MEDIAL BRANCH NERVE BLOCK UNDER FLUOROSCOPIC GUIDANCE AT L2, L3, L4 AND L5 DORSAL RAMI WITH SEDATION (CPT 86772) performed by Altagracia Scott MD at [facility] 8/19/2021    LEFT S1, S2, S3 & L5 DORSAL RAMUS  BLOCK UNDER FLUOROSCOPY performed by Altagracia Scott MD at [address] 7/1/2021    LUMBAR EPIDURAL STEROID INJECTION UNDER FLUOROSCOPIC GUIDANCE AT L4-L5 LEFT PARAMEDIAN LEFT SACROILIAC JOINT INJECTION performed by Altagracia Scott MD at [address] [place] [address] Right     UPPER GASTROINTESTINAL ENDOSCOPY  10/2020       Prior to Admission medications    Medication Sig Start Date End Date Taking? Authorizing Provider   HYDROcodone-acetaminophen (NORCO) 5-325 MG per tablet Take 1 tablet by mouth 2 times daily as needed for Pain (once or twice a day as needed) for up to 7 days.  Take lowest dose possible to manage pain 12/6/21 12/13/21 Yes Altagracia Scott MD   nabumetone (RELAFEN) 750 MG tablet Take 1 tablet by mouth 2 times daily as needed for Pain 10/14/21  Yes Altagracia Scott MD   LANTUS SOLOSTAR 100 UNIT/ML injection pen Inject 32 Units into the skin nightly  6/7/21  Yes Historical Provider, MD   Lidocaine 1.8 % PTCH Apply patch 12 hours and remove for 12 hours 6/24/21  Yes Altagracia Scott MD   TRULICITY 1.5 RY/6.0JA SOPN 1.5 mg once a week  10/16/20  Yes Historical Provider, MD   pioglitazone (ACTOS) 15 MG tablet take 1 tablet by mouth once daily 10/14/20  Yes Historical Provider, MD   pantoprazole (PROTONIX) 40 MG tablet Take 40 mg by mouth daily    Yes Historical Provider, MD   ALPRAZolam Latrelle Bible) 0.5 MG tablet as needed. Yes Historical Provider, MD   methylphenidate (CONCERTA) 27 MG extended release tablet Take 27 mg by mouth as needed. Yes Historical Provider, MD   glimepiride (AMARYL) 4 MG tablet Take 4 mg by mouth 2 times daily   Yes Historical Provider, MD       No Known Allergies    Social History     Socioeconomic History    Marital status:      Spouse name: Not on file    Number of children: Not on file    Years of education: Not on file    Highest education level: Not on file   Occupational History    Not on file   Tobacco Use    Smoking status: Never Smoker    Smokeless tobacco: Never Used   Vaping Use    Vaping Use: Never used   Substance and Sexual Activity    Alcohol use: Yes     Comment: Socially    Drug use: Never    Sexual activity: Not on file   Other Topics Concern    Not on file   Social History Narrative    Not on file     Social Determinants of Health     Financial Resource Strain:     Difficulty of Paying Living Expenses: Not on file   Food Insecurity:     Worried About Running Out of Food in the Last Year: Not on file    Trace of Food in the Last Year: Not on file   Transportation Needs:     Lack of Transportation (Medical): Not on file    Lack of Transportation (Non-Medical):  Not on file   Physical Activity:     Days of Exercise per Week: Not on file    Minutes of Exercise per Session: Not on file   Stress:     Feeling of Stress : Not on file   Social Connections:     Frequency of Communication with Friends and Family: Not on file    Frequency of Social Gatherings with Friends and Family: Not on file    Attends Holiness Services: Not on file   [employer] or patient was counseled at length about the risks of suri Covid-19 during their perioperative period and any recovery window from their procedure. The patient was made aware that suri Covid-19  may worsen their prognosis for recovering from their procedure  and lend to a higher morbidity and/or mortality risk. All material risks, benefits, and reasonable alternatives including postponing the procedure were discussed. The patient does wish to proceed with the procedure at this time.       Abdelrahman Gregorio MD

## 2021-12-20 ENCOUNTER — OFFICE VISIT (OUTPATIENT)
Dept: PAIN MANAGEMENT | Age: 51
End: 2021-12-20
Payer: COMMERCIAL

## 2021-12-20 VITALS
OXYGEN SATURATION: 100 % | HEIGHT: 73 IN | SYSTOLIC BLOOD PRESSURE: 140 MMHG | DIASTOLIC BLOOD PRESSURE: 80 MMHG | RESPIRATION RATE: 16 BRPM | WEIGHT: 285 LBS | TEMPERATURE: 97.3 F | HEART RATE: 88 BPM | BODY MASS INDEX: 37.77 KG/M2

## 2021-12-20 DIAGNOSIS — M25.552 HIP PAIN, LEFT: ICD-10-CM

## 2021-12-20 DIAGNOSIS — M54.16 LUMBAR RADICULOPATHY: Primary | ICD-10-CM

## 2021-12-20 DIAGNOSIS — M51.36 DDD (DEGENERATIVE DISC DISEASE), LUMBAR: ICD-10-CM

## 2021-12-20 DIAGNOSIS — M47.817 LUMBOSACRAL SPONDYLOSIS WITHOUT MYELOPATHY: ICD-10-CM

## 2021-12-20 DIAGNOSIS — M53.3 SACROILIAC DYSFUNCTION: ICD-10-CM

## 2021-12-20 DIAGNOSIS — E66.9 OBESITY, UNSPECIFIED CLASSIFICATION, UNSPECIFIED OBESITY TYPE, UNSPECIFIED WHETHER SERIOUS COMORBIDITY PRESENT: ICD-10-CM

## 2021-12-20 PROCEDURE — 1036F TOBACCO NON-USER: CPT | Performed by: ANESTHESIOLOGY

## 2021-12-20 PROCEDURE — 99213 OFFICE O/P EST LOW 20 MIN: CPT | Performed by: ANESTHESIOLOGY

## 2021-12-20 PROCEDURE — 99024 POSTOP FOLLOW-UP VISIT: CPT | Performed by: ANESTHESIOLOGY

## 2021-12-20 PROCEDURE — G8484 FLU IMMUNIZE NO ADMIN: HCPCS | Performed by: ANESTHESIOLOGY

## 2021-12-20 PROCEDURE — G8417 CALC BMI ABV UP PARAM F/U: HCPCS | Performed by: ANESTHESIOLOGY

## 2021-12-20 PROCEDURE — G8427 DOCREV CUR MEDS BY ELIG CLIN: HCPCS | Performed by: ANESTHESIOLOGY

## 2021-12-20 RX ORDER — PREGABALIN 50 MG/1
50 CAPSULE ORAL 3 TIMES DAILY
Qty: 90 CAPSULE | Refills: 2 | Status: SHIPPED
Start: 2021-12-20 | End: 2022-03-02 | Stop reason: SDUPTHER

## 2021-12-20 RX ORDER — METHYLPREDNISOLONE 4 MG/1
TABLET ORAL
Qty: 1 KIT | Refills: 0 | Status: SHIPPED | OUTPATIENT
Start: 2021-12-20 | End: 2021-12-26

## 2021-12-20 RX ORDER — GABAPENTIN 300 MG/1
300 CAPSULE ORAL 3 TIMES DAILY
COMMUNITY
End: 2021-12-20

## 2021-12-20 RX ORDER — HYDROCODONE BITARTRATE AND ACETAMINOPHEN 5; 325 MG/1; MG/1
1 TABLET ORAL 2 TIMES DAILY PRN
Qty: 14 TABLET | Refills: 0 | Status: SHIPPED | OUTPATIENT
Start: 2021-12-20 | End: 2021-12-27

## 2021-12-20 NOTE — PROGRESS NOTES
Do you currently have any of the following:    Fever: No  Headache:  No  Cough: No  Shortness of breath: No  Exposed to anyone with these symptoms: Lubna Lam TONI Henao presents to the [address] [place] [address] on 12/20/2021. Denice Rhodes is complaining of pain left hip/buttock/leg   The pain is constant. The pain is described as aching, shooting and stabbing. Pain is rated on his best day at a 8, on his worst day at a 10, and on average at a 10 on the VAS scale. He took his last dose of Neurontin     Any procedures since your last visit:     Pacemaker or defibrillator: No managed by     He is  on NSAIDS and is not on anticoagulation medications to include none and is managed by     Medication Contract and Consent for Opioid Use Documents Filed      No documents found                BP (!) 140/80   Pulse 88   Temp 97.3 °F (36.3 °C) (Infrared)   Resp 16   Ht 6' 1\" (1.854 m)   Wt 285 lb (129.3 kg)   SpO2 100%   BMI 37.60 kg/m²      No LMP for male patient.

## 2021-12-20 NOTE — PROGRESS NOTES
[facility]  [place], [address]  Dept: [phone]    Follow up Note      Beatriz Henao     Date of Visit:  12/20/2021     CC:  Patient presents for follow up   Chief Complaint   Patient presents with    Follow-up     left hip/buttock/ leg      HPI:  Chronic low back pain for > a year ago after he felt a pop while he was stopping a trailer in Nov 2019.     Pain over the left low back and radiates to the left groin.     Tried multiple modalities of treatment including -meds/ chiropractic treatment, PT.     Has been evaluated by NSG and referred for interventions. Nursing notes and details of the pain history reviewed. Please see intake notes for details. S/P Interventions had helped- most recently had underwent RFA on 12/13/2021 with very good pain relief. Apparently lifted a heavy object and also strained recently and has noticed new onset left LE pain with intermittent left LE give way. Denies new onset B/ B symptoms. Previous treatments:   Physical Therapy / HEP: yes      Chiropractic treatment: yes, for > 2 months recently     Medications: - NSAID's : yes                       - Membrane stabilizers : yes- gabapentin                       - Opioids : not on chronic opioids                       - Adjuvants or Others : yes     Surgeries: no LS or hip surgery      He has not been on anticoagulation medications      He has not been on herbal supplements.       He is diabetic.     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     Imaging:   MRI of LS spine: 4/6/2021:         X- ray Pelvis: 7/16/2020:      FINDINGS:   No fracture or dislocation seen. Bone mineralization is normal for   age. There is joint space narrowing in the bilateral hips. Nonobstructed bowel gas pattern.  Multiple pelvic phleboliths.           Impression   Moderate degenerative changes in the bilateral hips.              Potential Aberrant Drug-Related Behavior: no     Urine Drug Screening: no    OARRS report[de-identified]  reviewed today- consistent    Past Medical History:   Diagnosis Date    Acid reflux     ADHD     Anxiety     DM (diabetes mellitus) (HCC)     Low back pain     Protein in urine        Past Surgical History:   Procedure Laterality Date    ENDOSCOPY, COLON, DIAGNOSTIC      NERVE BLOCK Left 6/8/2021    LEFT LUMBAR MEDIAL BRANCH NERVE BLOCK UNDER FLUOROSCOPIC GUIDANCE AT L2, L3, L4 AND L5 DORSAL RAMI WITH SEDATION (CPT 98333) performed by Cristhian Pham MD at [facility] Left 8/19/2021    LEFT S1, S2, S3 & L5 DORSAL RAMUS  BLOCK UNDER FLUOROSCOPY performed by Cristhian Pham MD at [place] [id number] Left 7/1/2021    LUMBAR EPIDURAL STEROID INJECTION UNDER FLUOROSCOPIC GUIDANCE AT L4-L5 LEFT PARAMEDIAN LEFT SACROILIAC JOINT INJECTION performed by Cristhian Pham MD at [address] 12/13/2021    RADIOFREQUENCY ABLATION OF LEFT LUMBAR L4, L5, S1 UNDER FLUOROSCOPY performed by Cristhian Pham MD at [address] [place] [address]     UPPER GASTROINTESTINAL ENDOSCOPY  10/2020         Prior to Admission medications    Medication Sig Start Date End Date Taking? Authorizing Provider   gabapentin (NEURONTIN) 300 MG capsule Take 300 mg by mouth 3 times daily.    Yes Historical Provider, MD   nabumetone (RELAFEN) 750 MG tablet Take 1 tablet by mouth 2 times daily as needed for Pain 10/14/21  Yes Cristhian Pham MD   LANTUS SOLOSTAR 100 UNIT/ML injection pen Inject 32 Units into the skin nightly  6/7/21  Yes Historical Provider, MD   Lidocaine 1.8 % PTCH Apply patch 12 hours and remove for 12 hours 6/24/21  Yes Cristhian Pham MD   TRULICITY 1.5 IG/2.3DU SOPN 1.5 mg once a week  10/16/20  Yes Historical Provider, MD   pioglitazone (ACTOS) 15 MG tablet take 1 tablet by mouth once daily 10/14/20  Yes Historical Provider, MD or Ex-Partner: Not on file    Emotionally Abused: Not on file    Physically Abused: Not on file    Sexually Abused: Not on file   Housing Stability:     Unable to Pay for Housing in the Last Year: Not on file    Number of Places Lived in the Last Year: Not on file    Unstable Housing in the Last Year: Not on file       Family History   Adopted: Yes       REVIEW OF SYSTEMS:     Wilmar Span denies fever/chills, chest pain, shortness of breath, new bowel or bladder complaints. All other review of systems was negative. PHYSICAL EXAMINATION:      BP (!) 140/80   Pulse 88   Temp 97.3 °F (36.3 °C) (Infrared)   Resp 16   Ht 6' 1\" (1.854 m)   Wt 285 lb (129.3 kg)   SpO2 100%   BMI 37.60 kg/m²   General:       General appearance:  Pleasant and well-hydrated, in no distress and A & O x 3  Build:Obese  Function: Rises from seated position easily and Moves about room without difficulty     HEENT:     Head:normocephalic, atraumatic       Lungs:     Breathing:normal breathing pattern      CVS:     RRR     Abdomen:     Shape:obese, non-distended and normal     Cervical spine:     Inspection:normal     Thoracic spine:                Spine inspection:normal      Lumbar spine:     Spine inspection: Normal   Palpation: Tenderness paravertebral muscles Yes left  Range of motion: Decreased, flexion Decreased, Lateral bending, extension and rotation bilaterally reduced is painful.   Lumbar facet loading +   Sacroiliac joint tenderness Yes left  Piriformis tenderness: negative bilaterally  SLR : negative bilaterally  Trochanteric bursa tenderness: positive left     Musculoskeletal:     Trigger points no     Extremities:     Tremors:None bilaterally upper and lower  Edema:none x all 4 extremities     Neurological:     Sensory: Normal to light touch      Motor:                 Right Quadriceps 5/5          Left Quadriceps 5/5           Right Gastrocnemius 5/5    Left Gastrocnemius 5/5  Right Ant Tibialis 5/5  Left Ant Tibialis 5/5     Reflexes:    B/l equal reduced     Gait:normal Yes     Dermatology:     Skin:no rashes or lesions noted    Assessment/Plan:     Diagnosis Orders   1. Lumbosacral spondylosis without myelopathy      2. DDD (degenerative disc disease), lumbar      3. Sacroiliac dysfunction      4. Obesity, unspecified classification, unspecified obesity type, unspecified whether serious comorbidity present            54 y.o. male with h/o low back pain for > a year following an injury. Failed > 6 weeks of conservative treatment.     Prior MRI: of LS spine reviewed. Has been evaluated by NSG and referred for interventions. S/P LESI + left SIJ injection > 80% relief for 2 weeks. S/P RFA- has helped the low back pain. Recent exacerbation of pain and noted new onset left LE pain and left LE give way and weakness. Continues to do HEP. Also has left hip pain.     Plan:  MRI LS spine- to assess the new onset left LE radicular pain. MRI of Left hip. Gabapentin - caused drowsiness. Has been D/ C'd. Will start Lyrica 50 mg BID and increase to 50 mg TID. Use instructions reviewed. Medrol Dose pack. Muscle relaxant for prn us. He has Relafen for prn use -(Caution for long term use) (BUN/ creatinine: N on 8/23/2021).     Counseling : Patient encouraged to stay active and to watch/lose weight and to do Regular home exercise program as tolerated - stretching / strengthening. F/U after MRI     Treatment plan discussed with the patient including medication and procedure side effects.     Controlled Substances Monitoring:      OARRS reviewed- not on chronic opioids.     We discussed with the patient that combining opioids, benzodiazepines, alcohol, illicit drugs or sleep aids increases the risk of respiratory depression including death. We discussed that these medications may cause drowsiness, sedation or dizziness and have counseled the patient not to drive or operate machinery.  We have discussed that these medications will be prescribed only by one provider. We have discussed with the patient about age related risk factors and have thoroughly discussed the importance of taking these medications as prescribed.  The patient verbalizes understanding.     Malini Mathis MD    CC:  Katina Lyman MD

## 2022-01-03 ENCOUNTER — TELEPHONE (OUTPATIENT)
Dept: PAIN MANAGEMENT | Age: 52
End: 2022-01-03

## 2022-01-03 ENCOUNTER — APPOINTMENT (OUTPATIENT)
Dept: MRI IMAGING | Age: 52
End: 2022-01-03
Payer: COMMERCIAL

## 2022-01-03 ENCOUNTER — HOSPITAL ENCOUNTER (EMERGENCY)
Age: 52
Discharge: HOME | End: 2022-01-03
Attending: EMERGENCY MEDICINE
Payer: COMMERCIAL

## 2022-01-03 VITALS
HEIGHT: 73 IN | DIASTOLIC BLOOD PRESSURE: 90 MMHG | TEMPERATURE: 97.3 F | HEART RATE: 94 BPM | BODY MASS INDEX: 37.77 KG/M2 | RESPIRATION RATE: 18 BRPM | OXYGEN SATURATION: 98 % | SYSTOLIC BLOOD PRESSURE: 143 MMHG | WEIGHT: 285 LBS

## 2022-01-03 DIAGNOSIS — M54.16 LUMBAR RADICULOPATHY: Primary | ICD-10-CM

## 2022-01-03 PROCEDURE — 72146 MRI CHEST SPINE W/O DYE: CPT

## 2022-01-03 PROCEDURE — 99282 EMERGENCY DEPT VISIT SF MDM: CPT

## 2022-01-03 PROCEDURE — 72148 MRI LUMBAR SPINE W/O DYE: CPT

## 2022-01-03 RX ORDER — PREDNISONE 50 MG/1
50 TABLET ORAL DAILY
Qty: 5 TABLET | Refills: 0 | Status: SHIPPED | OUTPATIENT
Start: 2022-01-03 | End: 2022-01-08

## 2022-01-03 ASSESSMENT — PAIN DESCRIPTION - LOCATION: LOCATION: BACK

## 2022-01-03 ASSESSMENT — PAIN SCALES - GENERAL: PAINLEVEL_OUTOF10: 10

## 2022-01-03 NOTE — TELEPHONE ENCOUNTER
Genaro aguayo stated that he has been experiencing what feels like foot drop. He said he has been taking Lyrica and after some research on this medication he learned that it could cause \"a gate\" in your walk. He said he he walked bout 30-40 feet and had to sit. He tried to walk 30-40 feet again the next day and experienced the foot drop again. The Edifilm Setting is working for him, but he is wondering if that could be the cause of the foot drop or if his hip is out of place. There was an MRI ordered, but he can't get in until February 2022. Please advise on next steps.

## 2022-01-03 NOTE — TELEPHONE ENCOUNTER
Patient advised that he should visit the ER. He said he doesn't understand why he would need to go visit the ER and rack up a big bill when he is reaching out to his doctor to include him in on his plan of care. He request that you give him a phone call.

## 2022-01-03 NOTE — ED NOTES
Radiology Procedure Waiver   Name: My Vides  : 1970  MRN: [id number]    Date:  1/3/22    Time: 4:41 PM EST    Benefits of immediately proceeding with Radiology exam(s) without pre-testing outweigh the risks or are not indicated as specified below and therefore the following is/are being waived:    [] Pregnancy test   [] Patients LMP on-time and regular.   [] Patient had Tubal Ligation or has other Contraception Device. [] Patient  is Menopausal or Premenarcheal.    [] Patient had Full or Partial Hysterectomy. [] Protocol for Iodine allergy    [] MRI Questionnaire     [] BUN/Creatinine   [x] Patient age w/no hx of renal dysfunction. [] Patient on Dialysis. [x] Recent Normal Labs.   Electronically signed by Ty Medrano PA-C on 1/3/22 at 4:41 PM EST               Ty Medrano PA-C  [id number]

## 2022-01-03 NOTE — TELEPHONE ENCOUNTER
Lyrica should not be causing foot drop. He need to get evaluated in the ER regarding his foot drop. Thank you.

## 2022-01-03 NOTE — ED NOTES
FIRST PROVIDER CONTACT ASSESSMENT NOTE           Department of Emergency Medicine                 First Provider Note            1/3/22  2:26 PM EST    Date of Encounter: No admission date for patient encounter. Patient Name: Carlota Head  : 1970  MRN: [id number]    Chief Complaint: Back Pain (lower left x 4 weeks )      History of Present Illness:   Carlota Head is a 54 y.o. male who presents to the ED for foot drop. Recent fluoroscopic radiofrequency ablation by Dr. Foreign Zhang 2 weeks ago. Worsening pain. Hx diabetes. Focused Physical Exam:  VS:    ED Triage Vitals [22 1423]   BP Temp Temp src Pulse Resp SpO2 Height Weight   (!) 143/90 97.3 °F (36.3 °C) -- 94 18 98 % 6' 1\" (1.854 m) 285 lb (129.3 kg)        Physical Ex: Constitutional: Alert and non-toxic. Medical History:  has a past medical history of Acid reflux, ADHD, Anxiety, DM (diabetes mellitus) ([address] Low back pain, and Protein in urine. Surgical History:  has a past surgical history that includes Patella surgery (Right); Upper gastrointestinal endoscopy (10/2020); Endoscopy, colon, diagnostic; Nerve Block (Left, 2021); Pain management procedure (Left, 2021); Nerve Block (Left, 2021); and Pain management procedure (Left, 2021). Social History:  reports that he has never smoked. He has never used smokeless tobacco. He reports current alcohol use. He reports that he does not use drugs. Family History: family history is not on file. He was adopted. Allergies: Patient has no known allergies.      Initial Plan of Care: Initiate Treatment-Testing, Proceed toTreatment Area When Bed Available for ED Attending/MLP to Continue Care      ---END OF FIRST PROVIDER CONTACT ASSESSMENT NOTE---  Electronically signed by Oswald Mcdaniel PA-C   DD: 1/3/22       Oswald Mcdaniel PA-C  [id number]

## 2022-01-04 ASSESSMENT — ENCOUNTER SYMPTOMS
EYE PAIN: 0
DIARRHEA: 0
VOMITING: 0
COUGH: 0
BACK PAIN: 1
WHEEZING: 0
SHORTNESS OF BREATH: 0
ABDOMINAL PAIN: 0
EYE DISCHARGE: 0
NAUSEA: 0
SORE THROAT: 0
EYE REDNESS: 0
SINUS PRESSURE: 0

## 2022-01-04 NOTE — ED PROVIDER NOTES
ED  Provider Note  Admit Date/RoomTime: 1/3/2022  7:59 PM  ED Room: 02/02     HPI:   Vonnie Adames is a 54 y.o. male presenting to the ED for back pain, beginning days ago. History comes primarily from the patient. Patient Active Problem List:     Lumbosacral spondylosis without myelopathy     DDD (degenerative disc disease), lumbar     C7 radiculopathy     Mood disorder (HCC)     Multiple joint pain     Neck pain on left side     Pain and swelling of left elbow     Pain in left forearm     Type 2 diabetes mellitus without complication (HCC)     Sacroiliac dysfunction  . The complaint has been persistent, mild in severity, improved by nothing and worsened by changing position, walking. Associated symptoms include leg numbness. Cindi Mcnally is already followed with pain management and neurosurgery due to chronic low back pain, and has had radiofrequency ablations multiple times secondary to this. Patient states at the beginning of December, he did several activities during which time he feels he may have hurt his back. These activities included carrying a large jug of water, having sex with his wife, and lifting a piece of luggage over his head on an airplane. He states that over the course of the last several days, when he ambulates he feels as though it is difficult to take a step, and he has to use additional muscles in order to walk normally. He feels that his leg is painful with intermittent numbness on the left. Patient denies any bowel or bladder incontinence or retention, saddle anesthesia, bilateral symptoms, or other red flag findings concerning for cauda equina. Due to the nature of his symptoms, he contacted his pain management physician, who advised that the patient present to the emergency department for further evaluation and treatment. On arrival, the patient was assessed with history, physical exam, imaging studies and laboratory studies, vital signs.   Vital signs were stable on arrival and the patient was afebrile. Review of Systems   Constitutional: Positive for activity change. Negative for chills and fever. HENT: Negative for ear pain, sinus pressure and sore throat. Eyes: Negative for pain, discharge and redness. Respiratory: Negative for cough, shortness of breath and wheezing. Cardiovascular: Negative for chest pain. Gastrointestinal: Negative for abdominal pain, diarrhea, nausea and vomiting. Genitourinary: Negative for dysuria and frequency. Musculoskeletal: Positive for arthralgias, back pain and gait problem. Skin: Negative for rash and wound. Neurological: Negative for weakness and headaches. Hematological: Negative for adenopathy. All other systems reviewed and are negative. Physical Exam  Vitals reviewed. Constitutional:       General: He is not in acute distress. Appearance: He is well-developed. He is not diaphoretic. HENT:      Head: Normocephalic and atraumatic. Right Ear: There is no impacted cerumen. Left Ear: There is no impacted cerumen. Mouth/Throat:      Dentition: Abnormal dentition. Eyes:      Pupils: Pupils are equal, round, and reactive to light. Neck:      Vascular: No JVD. Trachea: No tracheal deviation. Cardiovascular:      Rate and Rhythm: Regular rhythm. Heart sounds: No murmur heard. No friction rub. No gallop. Pulmonary:      Effort: Pulmonary effort is normal. No respiratory distress. Breath sounds: Normal breath sounds. No stridor. No wheezing or rales. Chest:      Chest wall: No tenderness. Abdominal:      General: Bowel sounds are normal. There is no distension. Palpations: Abdomen is soft. Tenderness: There is no abdominal tenderness. There is no guarding. Musculoskeletal:         General: No swelling, tenderness, deformity or signs of injury. Normal range of motion. Cervical back: Normal range of motion. Right lower leg: No edema. Left lower leg: No edema. Skin:     General: Skin is warm and dry. Neurological:      General: No focal deficit present. Mental Status: He is alert. Cranial Nerves: No cranial nerve deficit. Comments: Patient has 2 out of 4 patellar and Achilles reflexes bilaterally, distal sensation is intact bilaterally. Patient is observed during ambulation and displays no evidence of foot drop or gait instability. 5 out of 5 strength in bilateral lower extremities. Psychiatric:         Behavior: Behavior normal.          Procedures     MDM  Number of Diagnoses or Management Options  Lumbar radiculopathy  Diagnosis management comments: Emergency Department evaluation was notable for generally reassuring work-up in the setting of worsening left-sided radiculopathy. The patient did undergo MRI as per protocol orders upfront, and his MRI did show worsening narrowing of the L4 stenosis that was previously known to the patient's condition. This stenosis has gone from moderate to severe, however it is unilateral in nature and is currently severe only at one nerve root. This is not resulting in a significant impact on the patient's ability to perform his activities of daily living. Patient is to follow-up with both his pain management physician and is neurosurgeon for this as he may require decompressive surgery in an elective setting to properly address his condition. Patient's vital signs been stable for the entirety of the emergency department stay and he is considered stable for discharge to home with outpatient follow-up. Patient understood this plan of care and was amenable to plan. They were advised to return to the emergency department should they develop fever, chills, night sweats, nausea, vomiting, diarrhea, chest pain, shortness of breath, or worsening of their symptoms despite treatment from this emergency department visit.   They were instructed to follow-up with their primary care provider signs reviewed. Exam: No acute distress, resting comfortably, heart regular rate and rhythm, lung sounds clear, patient with equal strength and sensation bilateral lower extremities, patient ambulatory with steady gait without ataxia    MDM: Patient presents with acute exacerbation of chronic low back pain. MRI today revealed severe stenosis L5 nerve root, had MRI in April 2021 which revealed moderate stenosis at that time. No other acute changes on MRI. No neurological deficits. Patient does follow with a neurosurgeon. Patient informed to call his neurosurgeon for same tomorrow morning to schedule follow-up as soon as possible for reevaluation and ongoing management. Started on course of steroids. This patient's history, physical exam and any procedures were performed by the resident. I have personally seen and examined this patient. I have fully participated in the care of this patient.  I have reviewed all pertinent clinical information, including history, physical exam and plan with the resident. Morales Mike DO  9:25 PM EST   1/3/22      [DB]      ED Course User Index  [DB] Leah Jang, DO       --------------------------------------------- PAST HISTORY ---------------------------------------------  Past Medical History:  has a past medical history of Acid reflux, ADHD, Anxiety, [facility]) [facility] Low back pain, and Protein in urine. Past Surgical History:  has a past surgical history that includes Patella surgery (Right); Upper gastrointestinal endoscopy (10/2020); Endoscopy, colon, diagnostic; Nerve Block (Left, 6/8/2021); Pain management procedure (Left, 7/1/2021); Nerve Block (Left, 8/19/2021); and Pain management procedure (Left, 12/13/2021). Social History:  reports that he has never smoked. He has never used smokeless tobacco. He reports current alcohol use. He reports that he does not use drugs. Family History: family history is not on file. He was adopted. The patients home medications have been reviewed. Allergies: Patient has no known allergies. -------------------------------------------------- RESULTS -------------------------------------------------  Labs:  No results found for this visit on 01/03/22. Radiology:  MRI THORACIC SPINE WO CONTRAST   Final Result   Lumbar spine:      At L4-L5, left foraminal disc protrusion impinges on the exiting left L4   nerve root and results in severe left neural foraminal narrowing. Moderate   canal stenosis      Other mild degenerative changes of lumbar spine as described. Thoracic spine MRI:      At T9-T10, moderate bilateral neural foraminal narrowing. At T6-T7, mild canal stenosis. At T3-T4, central disc protrusion. RECOMMENDATIONS:   Unavailable         MRI LUMBAR SPINE WO CONTRAST   Final Result   Lumbar spine:      At L4-L5, left foraminal disc protrusion impinges on the exiting left L4   nerve root and results in severe left neural foraminal narrowing. Moderate   canal stenosis      Other mild degenerative changes of lumbar spine as described. Thoracic spine MRI:      At T9-T10, moderate bilateral neural foraminal narrowing. At T6-T7, mild canal stenosis. At T3-T4, central disc protrusion. RECOMMENDATIONS:   Unavailable             ------------------------- NURSING NOTES AND VITALS REVIEWED ---------------------------  Date / Time Roomed:  1/3/2022  7:59 PM  ED Bed Assignment:  02/02    The nursing notes within the ED encounter and vital signs as below have been reviewed.    BP (!) 143/90   Pulse 94   Temp 97.3 °F (36.3 °C)   Resp 18   Ht 6' 1\" (1.854 m)   Wt 285 lb (129.3 kg)   SpO2 98%   BMI 37.60 kg/m²   Oxygen Saturation Interpretation: Normal      ------------------------------------------ PROGRESS NOTES ------------------------------------------  9:18 PM EST  I have spoken with the patient and discussed todays results, in addition to providing specific

## 2022-01-10 ENCOUNTER — TELEPHONE (OUTPATIENT)
Dept: PAIN MANAGEMENT | Age: 52
End: 2022-01-10

## 2022-01-10 NOTE — TELEPHONE ENCOUNTER
Recommend to keep the appointment for 24th. I have reviewed the results of the MRI. I Will go over the results during the follow up visit. Thank you.

## 2022-01-10 NOTE — TELEPHONE ENCOUNTER
Genaro aguayo said he has had the MRI done that you requested. He is scheduled on 01/24/2022 but wants to know if you would like for him to come in sooner to go over the MRI result. Please advise.

## 2022-01-24 ENCOUNTER — OFFICE VISIT (OUTPATIENT)
Dept: PAIN MANAGEMENT | Age: 52
End: 2022-01-24
Payer: COMMERCIAL

## 2022-01-24 VITALS
RESPIRATION RATE: 16 BRPM | SYSTOLIC BLOOD PRESSURE: 138 MMHG | DIASTOLIC BLOOD PRESSURE: 88 MMHG | HEART RATE: 84 BPM | HEIGHT: 73 IN | OXYGEN SATURATION: 99 % | TEMPERATURE: 97.6 F | BODY MASS INDEX: 37.77 KG/M2 | WEIGHT: 285 LBS

## 2022-01-24 DIAGNOSIS — M54.16 LUMBAR RADICULOPATHY: ICD-10-CM

## 2022-01-24 DIAGNOSIS — M53.3 SACROILIAC DYSFUNCTION: ICD-10-CM

## 2022-01-24 DIAGNOSIS — M51.36 DDD (DEGENERATIVE DISC DISEASE), LUMBAR: Primary | ICD-10-CM

## 2022-01-24 DIAGNOSIS — M47.817 LUMBOSACRAL SPONDYLOSIS WITHOUT MYELOPATHY: ICD-10-CM

## 2022-01-24 DIAGNOSIS — E66.9 OBESITY, UNSPECIFIED CLASSIFICATION, UNSPECIFIED OBESITY TYPE, UNSPECIFIED WHETHER SERIOUS COMORBIDITY PRESENT: ICD-10-CM

## 2022-01-24 PROCEDURE — G8484 FLU IMMUNIZE NO ADMIN: HCPCS | Performed by: ANESTHESIOLOGY

## 2022-01-24 PROCEDURE — G8417 CALC BMI ABV UP PARAM F/U: HCPCS | Performed by: ANESTHESIOLOGY

## 2022-01-24 PROCEDURE — 99213 OFFICE O/P EST LOW 20 MIN: CPT

## 2022-01-24 PROCEDURE — 99213 OFFICE O/P EST LOW 20 MIN: CPT | Performed by: ANESTHESIOLOGY

## 2022-01-24 PROCEDURE — 1036F TOBACCO NON-USER: CPT | Performed by: ANESTHESIOLOGY

## 2022-01-24 PROCEDURE — G8427 DOCREV CUR MEDS BY ELIG CLIN: HCPCS | Performed by: ANESTHESIOLOGY

## 2022-01-24 PROCEDURE — 99214 OFFICE O/P EST MOD 30 MIN: CPT | Performed by: ANESTHESIOLOGY

## 2022-01-24 RX ORDER — CYCLOBENZAPRINE HCL 5 MG
5 TABLET ORAL 2 TIMES DAILY PRN
Qty: 30 TABLET | Refills: 1 | Status: SHIPPED | OUTPATIENT
Start: 2022-01-24 | End: 2022-02-23

## 2022-01-24 RX ORDER — HYDROCODONE BITARTRATE AND ACETAMINOPHEN 5; 325 MG/1; MG/1
1 TABLET ORAL 2 TIMES DAILY PRN
Qty: 14 TABLET | Refills: 0 | Status: SHIPPED
Start: 2022-01-24 | End: 2022-06-06 | Stop reason: SDUPTHER

## 2022-01-24 NOTE — PROGRESS NOTES
Do you currently have any of the following:    Fever: No  Headache:  No  Cough: No  Shortness of breath: No  Exposed to anyone with these symptoms: Lubna Lam TONI Henao presents to the [address] [place] [address] on 1/24/2022. Jonas Eastman is complaining of pain lower back  The pain is intermittent. The pain is described as  . Pain is rated on his best day at a 4, on his worst day at a 8, and on average at a 7 on the VAS scale. He took his last dose of Lyrica    Any procedures since your last visit:     Pacemaker or defibrillator: No managed by    He is not on NSAIDS and is not on anticoagulation medications to include none and is managed by      Medication Contract and Consent for Opioid Use Documents Filed      No documents found                /88   Pulse 84   Temp 97.6 °F (36.4 °C) (Infrared)   Resp 16   Ht 6' 1\" (1.854 m)   Wt 285 lb (129.3 kg)   SpO2 99%   BMI 37.60 kg/m²      No LMP for male patient.

## 2022-01-24 NOTE — PROGRESS NOTES
[facility] - [facility]AVIOR[facility] HE[facility] Pain Management  Shannon Sequeira  Dept: [phone]    Follow up Note      Edgardo Henao     Date of Visit:  1/24/2022     CC:  Patient presents for follow up   Chief Complaint   Patient presents with    Follow-up     post RFA      HPI:  Chronic low back pain for > a year ago after he felt a pop while he was stopping a trailer in Nov 2019.     Pain over the left low back and radiates to the left groin.     Tried multiple modalities of treatment including -meds/ chiropractic treatment, PT.     Has been evaluated by NSG and referred for interventions. Nursing notes and details of the pain history reviewed. Please see intake notes for details. S/P Interventions had helped- most recently had underwent RFA on 12/13/2021 with very good pain relief. Apparently lifted a heavy object and also strained recently and has noticed new onset left LE pain with intermittent left LE give way. Denies new onset B/ B symptoms.     Previous treatments:   Physical Therapy / HEP: yes      Chiropractic treatment: yes, for > 2 months recently     Medications: - NSAID's : yes                       - Membrane stabilizers : yes- gabapentin                       - Opioids : not on chronic opioids                       - Adjuvants or Others : yes     Surgeries: no LS or hip surgery      He has not been on anticoagulation medications      He has not been on herbal supplements.       He is diabetic.     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     Imaging:       MRI of left Hip: 1/5/2022:      MRI of LS spine and MRI of Thoracic spine: 1/3/2022:  Impression   Lumbar spine:       At L4-L5, left foraminal disc protrusion impinges on the exiting left L4   nerve root and results in severe left neural foraminal narrowing.  Moderate   canal stenosis       Other mild degenerative changes of lumbar spine as described.       Thoracic spine MRI:       At T9-T10, moderate bilateral neural foraminal narrowing.       At T6-T7, mild canal stenosis.       At T3-T4, central disc protrusion. MRI of LS spine: 4/6/2021:         X- ray Pelvis: 7/16/2020:      FINDINGS:   No fracture or dislocation seen. Bone mineralization is normal for   age. There is joint space narrowing in the bilateral hips. Nonobstructed bowel gas pattern. Multiple pelvic phleboliths.           Impression   Moderate degenerative changes in the bilateral hips.                Potential Aberrant Drug-Related Behavior: no     Urine Drug Screening: no    OARRS report[de-identified]  reviewed today- consistent    Past Medical History:   Diagnosis Date    Acid reflux     ADHD     Anxiety     DM (diabetes mellitus) (HCC)     Low back pain     Protein in urine        Past Surgical History:   Procedure Laterality Date    ENDOSCOPY, COLON, DIAGNOSTIC      NERVE BLOCK Left 6/8/2021    LEFT LUMBAR MEDIAL BRANCH NERVE BLOCK UNDER FLUOROSCOPIC GUIDANCE AT L2, L3, L4 AND L5 DORSAL RAMI WITH SEDATION (CPT 35845) performed by Devyn Cotter MD at [facility] 8/19/2021    LEFT S1, S2, S3 & L5 DORSAL RAMUS  BLOCK UNDER FLUOROSCOPY performed by Devyn Cotter MD at [address] [place] [address] Left 7/1/2021    LUMBAR EPIDURAL STEROID INJECTION UNDER FLUOROSCOPIC GUIDANCE AT L4-L5 LEFT PARAMEDIAN LEFT SACROILIAC JOINT INJECTION performed by Devyn Cotter MD at [address] [place] [address] Left 12/13/2021    RADIOFREQUENCY ABLATION OF LEFT LUMBAR L4, L5, S1 UNDER FLUOROSCOPY performed by Devyn Cotter MD at [address] [place] [address]     UPPER GASTROINTESTINAL ENDOSCOPY  10/2020         Prior to Admission medications    Medication Sig Start Date End Date Taking? Authorizing Provider   pregabalin (LYRICA) 50 MG capsule Take 1 capsule by mouth 3 times daily for 90 days.  12/20/21 3/20/22 Yes MD carlos manuel Gonsalves (RELAFEN) 750 MG tablet Take 1 tablet by mouth 2 times daily as needed for Pain 10/14/21  Yes Martin Segal MD   LANTUS SOLOSTAR 100 UNIT/ML injection pen Inject 32 Units into the skin nightly  6/7/21  Yes Historical Provider, MD   Lidocaine 1.8 % PTCH Apply patch 12 hours and remove for 12 hours 6/24/21  Yes Martin Segal MD   TRULICITY 1.5 OR/4.6VR SOPN 1.5 mg once a week  10/16/20  Yes Historical Provider, MD   pioglitazone (ACTOS) 15 MG tablet take 1 tablet by mouth once daily 10/14/20  Yes Historical Provider, MD   pantoprazole (PROTONIX) 40 MG tablet Take 40 mg by mouth daily    Yes Historical Provider, MD   ALPRAZolam Shelda George) 0.5 MG tablet as needed. Yes Historical Provider, MD   methylphenidate (CONCERTA) 27 MG extended release tablet Take 27 mg by mouth as needed. Yes Historical Provider, MD   glimepiride (AMARYL) 4 MG tablet Take 4 mg by mouth 2 times daily   Yes Historical Provider, MD       No Known Allergies    Social History     Socioeconomic History    Marital status:      Spouse name: Not on file    Number of children: Not on file    Years of education: Not on file    Highest education level: Not on file   Occupational History    Not on file   Tobacco Use    Smoking status: Never Smoker    Smokeless tobacco: Never Used   Vaping Use    Vaping Use: Never used   Substance and Sexual Activity    Alcohol use: Yes     Comment: Socially    Drug use: Never    Sexual activity: Not on file   Other Topics Concern    Not on file   Social History Narrative    Not on file     Social Determinants of Health     Financial Resource Strain:     Difficulty of Paying Living Expenses: Not on file   Food Insecurity:     Worried About Running Out of Food in the Last Year: Not on file    Trace of Food in the Last Year: Not on file   Transportation Needs:     Lack of Transportation (Medical): Not on file    Lack of Transportation (Non-Medical):  Not on file   Physical Activity:  Days of Exercise per Week: Not on file    Minutes of Exercise per Session: Not on file   Stress:     Feeling of Stress : Not on file   Social Connections:     Frequency of Communication with Friends and Family: Not on file    Frequency of Social Gatherings with Friends and Family: Not on file    Attends Yarsanism Services: Not on file    Active Member of [address] [place] [address] or Organizations: Not on file    Attends Club or Organization Meetings: Not on file    Marital Status: Not on file   Intimate Partner Violence:     Fear of Current or Ex-Partner: Not on file    Emotionally Abused: Not on file    Physically Abused: Not on file    Sexually Abused: Not on file   Housing Stability:     Unable to Pay for Housing in the Last Year: Not on file    Number of [place] in the Last Year: Not on file    Unstable Housing in the Last Year: Not on file       Family History   Adopted: Yes       REVIEW OF SYSTEMS:     Genaro denies fever/chills, chest pain, shortness of breath, new bowel or bladder complaints. All other review of systems was negative. PHYSICAL EXAMINATION:      /88   Pulse 84   Temp 97.6 °F (36.4 °C) (Infrared)   Resp 16   Ht 6' 1\" (1.854 m)   Wt 285 lb (129.3 kg)   SpO2 99%   BMI 37.60 kg/m²   General:       General appearance:  Pleasant and well-hydrated, in no distress and A & O x 3  Build:Obese  Function: Rises from seated position easily and Moves about room without difficulty     HEENT:     Head:normocephalic, atraumatic       Lungs:     Breathing:normal breathing pattern      CVS:     RRR     Abdomen:     Shape:obese, non-distended and normal     Cervical spine:     Inspection:normal     Thoracic spine:                Spine inspection:normal      Lumbar spine:     Spine inspection: Normal   Palpation: Tenderness paravertebral muscles Yes left  Range of motion: Decreased, flexion Decreased, Lateral bending, extension and rotation bilaterally reduced is painful.   Lumbar facet tenderness  Sacroiliac joint tenderness Yes left  Piriformis tenderness: negative bilaterally  SLR : negative bilaterally  Trochanteric bursa tenderness: positive left     Musculoskeletal:     Trigger points no     Extremities:     Tremors:None bilaterally upper and lower  Edema:none x all 4 extremities     Neurological:     Sensory: Normal to light touch      Motor:                 Right Quadriceps 5/5          Left Quadriceps 5/5           Right Gastrocnemius 5/5    Left Gastrocnemius 5/5  Right Ant Tibialis 55  Left Ant Tibialis 5/5     Reflexes:    B/l equal reduced     Gait:normal Yes     Dermatology:     Skin:no rashes or lesions noted    Assessment/Plan:     Diagnosis Orders   1. Lumbosacral spondylosis without myelopathy      2. DDD (degenerative disc disease), lumbar      3. Sacroiliac dysfunction      4. Obesity, unspecified classification, unspecified obesity type, unspecified whether serious comorbidity present            54 y.o. male with h/o low back pain for > a year following an injury. Failed > 6 weeks of conservative treatment.     Prior MRI: of LS spine reviewed. Has been evaluated by NSG and referred for interventions. S/P LESI + left SIJ injection > 80% relief for 2 weeks. S/P RFA- has helped the low back/ SIJ pain. Recent exacerbation of pain and noted new onset left LE pain and left LE give way and weakness. Continues to do HEP.     MRI LS spine done recently on 1/3/2022 reviewed: L4-5 disc protrusion/ left foraminal narrowing. MRI of Left hip mild OA. Gabapentin - caused drowsiness. Has been D/ C'd. Lyrica 50 mg TID. Use instructions reviewed. Has tried- Medrol Dose pack. Muscle relaxant for prn use- Refill flexeril    EMG/ NCS    Short course fo Norco for prn use. He has an appointment with NSG this week. Consider left TFESI L4/5. PT- script given. He has Relafen for prn use -(Caution for long term use) (BUN/ creatinine: N on 8/23/2021).       Counseling : Patient encouraged to stay active and to watch/lose weight and to do Regular home exercise program as tolerated - stretching / strengthening.      Treatment plan discussed with the patient including medication and procedure side effects.     Controlled Substances Monitoring:      OARRS reviewed- not on chronic opioids.     We discussed with the patient that combining opioids, benzodiazepines, alcohol, illicit drugs or sleep aids increases the risk of respiratory depression including death. We discussed that these medications may cause drowsiness, sedation or dizziness and have counseled the patient not to drive or operate machinery. We have discussed that these medications will be prescribed only by one provider. We have discussed with the patient about age related risk factors and have thoroughly discussed the importance of taking these medications as prescribed.  The patient verbalizes understanding.     Elinor Cardona MD    CC:  Eloy Lozoya MD fax: [phone]    Dr. Eri Hernandez  Fax: [phone]

## 2022-01-25 ENCOUNTER — OFFICE VISIT (OUTPATIENT)
Dept: NEUROSURGERY | Age: 52
End: 2022-01-25
Payer: COMMERCIAL

## 2022-01-25 VITALS — HEIGHT: 73 IN | BODY MASS INDEX: 37.77 KG/M2 | WEIGHT: 285 LBS

## 2022-01-25 DIAGNOSIS — M51.26 LUMBAR DISC HERNIATION: Primary | ICD-10-CM

## 2022-01-25 PROCEDURE — 1036F TOBACCO NON-USER: CPT | Performed by: PHYSICIAN ASSISTANT

## 2022-01-25 PROCEDURE — G8417 CALC BMI ABV UP PARAM F/U: HCPCS | Performed by: PHYSICIAN ASSISTANT

## 2022-01-25 PROCEDURE — G8484 FLU IMMUNIZE NO ADMIN: HCPCS | Performed by: PHYSICIAN ASSISTANT

## 2022-01-25 PROCEDURE — 99214 OFFICE O/P EST MOD 30 MIN: CPT | Performed by: PHYSICIAN ASSISTANT

## 2022-01-25 PROCEDURE — G8427 DOCREV CUR MEDS BY ELIG CLIN: HCPCS | Performed by: PHYSICIAN ASSISTANT

## 2022-01-25 NOTE — PROGRESS NOTES
Patient is here for follow up for low back and left leg pain/numbness/weakness for the past 6-8 weeks. He has had multiple SI, facet and AMRIK in the past with good improvements. Physical exam  Alert and Oriented X3  PERRLA, EOMI  NIELSON 5/5, except left 4/5 DF weakness  Sensation intact to LT and PP  Reflexes are 2+ and symmetric    A/P: patient is here for follow up for: low back and left leg pain. He does have severe left L4-5 foraminal stenosis due to disc herniation. He will follow up with pain management for an AMRIK.

## 2022-02-03 ENCOUNTER — OFFICE VISIT (OUTPATIENT)
Dept: NEUROLOGY | Age: 52
End: 2022-02-03
Payer: COMMERCIAL

## 2022-02-03 VITALS
BODY MASS INDEX: 37.77 KG/M2 | WEIGHT: 285 LBS | DIASTOLIC BLOOD PRESSURE: 80 MMHG | SYSTOLIC BLOOD PRESSURE: 100 MMHG | HEIGHT: 73 IN

## 2022-02-03 DIAGNOSIS — M54.16 LUMBAR RADICULOPATHY: ICD-10-CM

## 2022-02-03 DIAGNOSIS — M21.372 LEFT FOOT DROP: Chronic | ICD-10-CM

## 2022-02-03 DIAGNOSIS — M51.36 DDD (DEGENERATIVE DISC DISEASE), LUMBAR: ICD-10-CM

## 2022-02-03 DIAGNOSIS — M54.16 LEFT LUMBAR RADICULITIS: ICD-10-CM

## 2022-02-03 DIAGNOSIS — M79.605 LEFT LEG PAIN: Chronic | ICD-10-CM

## 2022-02-03 PROCEDURE — 95911 NRV CNDJ TEST 9-10 STUDIES: CPT | Performed by: PSYCHIATRY & NEUROLOGY

## 2022-02-03 PROCEDURE — 95886 MUSC TEST DONE W/N TEST COMP: CPT | Performed by: PSYCHIATRY & NEUROLOGY

## 2022-02-03 NOTE — PROGRESS NOTES
[facility]  Electrodiagnostic Laboratory  *Accredited by the [facility] with exemplary status  [address] [facility]  Phone: [phone]  Fax: [phone]    Referring Provider: Aravind Marina MD  Primary Care Physician: Denae Harrison MD  Patient Name: Ephraim Martinez  Patient YOB: 1970  Gender: male  BMI: Body mass index is 37.6 kg/m². Blood pressure 100/80, height 6' 1\" (1.854 m), weight 285 lb (129.3 kg). 2/3/2022    Description of clinical problem: Chronic left lumbar radiculopathy; history chronic left leg pain, foot drop, lumbar degenerative disc disease, diabetes mellitus. Chief Complaint   Patient presents with    Procedure     EMG     Pain Yes   ; Numbness/tingling  Yes; Weakness  Yes       Brief physical exam:   Sensory deficit No-denies subjective sensory deficit to light touch and pp, denies a stocking distribution of sensory loss; Weakness Yes-mild left dorsiflexion weakness, does not produce full effort due to guarding due to left leg and lower back pain. , history of foot drop; Atrophy  No; Reflex abnormality Yes-DTRs of lower extremities, not able to elicit. No ankle clonus. Musculoskeletal: Positive left leg straight leg raising test to an angle of 25-30 degrees. No fasciculations    Study Limitations: Pain of left leg, back pain. Full Name: Zari Yuen Gender: Male  MRN: [id number] YOB: 1970      Visit Date: 2/3/2022 10:53  Age: 54 Years 2 Months Old  Examining Physician: Dr. Quan Andrea   Referring Physician: Dr. Citlali Talamantes  Technician: Ashley Coast   Height: 6 feet 1 inch  Weight: 285 lbs  Notes: Radiculopathy (Lumbar)\DDD (Lumbar)      Motor NCS      Nerve / Sites Latency Amplitude Amp. 1-2 Distance Lat Diff Velocity Temp.    ms mV % cm ms m/s °C   L Peroneal - EDB      Ankle 4.22 7.4 100 8   32.7      Fib head 12.34 6.0 80.8 32 8.13 39 32.6      Pop fossa 14.64 5.4 72.3 10 2.29 44 32.6   R Peroneal - EDB Ankle 5.26 7.1 100 8   33.1      Fib head 12.92 5.5 78.1 32 7.66 42 33.3      Pop fossa 15.42 5.2 72.7 10 2.50 40 33.3   L Tibial - AH      Ankle 4.69 7.4 100 8   33      Pop fossa 15.52 6.4 86.6 45 10.83 42 32.9             Sensory NCS      Nerve / Sites Onset Lat Peak Lat PP Amp Amp. 1-2 Distance Velocity Temp.    ms ms µV % cm m/s °C   L Sural - Ankle (Calf)      Calf 3.39 4.17 5.8 100 14 41 33   R Sural - Ankle (Calf)      Calf 4.11 4.58 14.1 100 14 34 33.4   L Superficial peroneal - Ankle      Lat leg NR NR NR NR 10 NR 33   R Superficial peroneal - Ankle      Lat leg 2.45 3.18 11.4 100 10 41 33.4               F  Wave      Nerve F Lat M Lat F-M Lat    ms ms ms   L Peroneal - EDB 58.5 3.6 54.9   L Tibial - AH 59.5 4.5 55.0   R Peroneal - EDB 60.2 5.2 55.0       H Reflex      Nerve Lat Hmax    ms   L Tibial - Soleus 32.0   R Tibial - Soleus 31. 0       EMG         EMG Summary Table     Spontaneous MUAP Recruitment   Muscle IA Fib PSW Fasc H.F. Amp Dur. PPP Pattern   L. Tibialis anterior Incr 1+ 2+ None None Normal Normal None Normal   L. Gastroc (Medial head) Normal None None None None Normal Normal None *PA, Pain   L. Flexor digitorum longus Normal None None None None 1+ 1+ None Reduced   L. Extensor digitorum brevis Incr None 1+ None None Giant 3+ None Reduced   L. Abductor hallucis Incr None 1+ None None 2+ 2+ None Reduced   L. Vastus lateralis Normal None None None None 2+ 2+ None Reduced   L. Gluteus medius Normal None None None None Normal Normal None Normal   L Sacral paraspinals (upper) Normal None None None None -- -- -- Tech. Difficulty, adipose    *PA= activation (pain). Summary of Findings:   Nerve conduction studies:   · The following nerve conduction studies were abnormal:   · The left superficial peroneal sensory nerve conduction shows no response, possible technical difficulty related to large muscles, pain of the leg, adipose, positioning difficulty.   · All other nerve conduction studies listed in the table above were normal in latency, amplitude and conduction velocity. Needle EMG:   · Needle EMG was performed of the left lower extremity using a concentric needle. · The following abnormalities were seen on needle EMG: Increased insertional activity, 1+ fibrillation potentials or positive sharp waves noted of the left tibialis anterior muscle and left intrinsic foot muscles as listed with enlarged motor unit potentials in duration and amplitude and decreased recruitment (loss of motor units) in primarily a left lumbosacral (I.e., L5/S1) myotomal distribution. · Examination of the left upper sacral paraspinal muscle group was attempted but due to significant adipose tissue and positioning difficulty due to back pain could not be examined.  All other muscles tested, as listed in the table above demonstrated normal amplitude, duration, phases and recruitment and no active denervation signs were seen. Diagnostic Interpretation: This study was abnormal.   Electrodiagnosis: NCS/EMG examination performed of the left and right lower extremities and needle EMG examination of the left lower extremity shows electrodiagnostic evidence of a chronic left lumbosacral radiculopathy (I.e., primarily L5/S1 myotomal distribution) with mild ongoing and chronic denervation of a mild-moderate degree. There is no electrodiagnostic evidence for a chronic large fiber sensorimotor peripheral neuropathy. Clinical correlation is recommended. Previous Study: None prior or recent known. Technologist:   Physician: Johanny Jacome MD    Nerve conduction studies and electromyography were performed according to our laboratory policies and procedures which can be provided upon request. All abnormal values are identified in the table.  Laboratory normal values can also be provided upon request.       Cc: MD Shekhar Garcia MD

## 2022-02-24 ENCOUNTER — OFFICE VISIT (OUTPATIENT)
Dept: NEUROSURGERY | Age: 52
End: 2022-02-24
Payer: COMMERCIAL

## 2022-02-24 VITALS
WEIGHT: 285 LBS | RESPIRATION RATE: 20 BRPM | SYSTOLIC BLOOD PRESSURE: 119 MMHG | TEMPERATURE: 98.1 F | DIASTOLIC BLOOD PRESSURE: 84 MMHG | HEIGHT: 73 IN | BODY MASS INDEX: 37.77 KG/M2 | HEART RATE: 83 BPM | OXYGEN SATURATION: 98 %

## 2022-02-24 DIAGNOSIS — M54.42 ACUTE MIDLINE LOW BACK PAIN WITH LEFT-SIDED SCIATICA: Primary | ICD-10-CM

## 2022-02-24 PROCEDURE — G8427 DOCREV CUR MEDS BY ELIG CLIN: HCPCS | Performed by: NEUROLOGICAL SURGERY

## 2022-02-24 PROCEDURE — G8417 CALC BMI ABV UP PARAM F/U: HCPCS | Performed by: NEUROLOGICAL SURGERY

## 2022-02-24 PROCEDURE — 99213 OFFICE O/P EST LOW 20 MIN: CPT | Performed by: NEUROLOGICAL SURGERY

## 2022-02-24 PROCEDURE — G8484 FLU IMMUNIZE NO ADMIN: HCPCS | Performed by: NEUROLOGICAL SURGERY

## 2022-02-24 PROCEDURE — 1036F TOBACCO NON-USER: CPT | Performed by: NEUROLOGICAL SURGERY

## 2022-02-24 NOTE — PROGRESS NOTES
Patient is here for follow up consult for: left back and leg pain    Physical exam  Alert and Oriented X3  PERRLA, EOMI  NIELSON 5/5 except 4/5 in left dorsiflexor  Sensation intact to LT and PP  Reflexes are 2+ and symmetric    A/P: patient is here for follow up for: back and left leg pain. His MRI shows a left L4-L5 herniated disk. He is neurologically stable. He has failed PT and epidurals. I am recommending a left L4-L5 hemilaminotomy and diskectomy.  He will call when he is ready    Mushtaq Nieves MD

## 2022-03-02 ENCOUNTER — PREP FOR PROCEDURE (OUTPATIENT)
Dept: PAIN MANAGEMENT | Age: 52
End: 2022-03-02

## 2022-03-02 ENCOUNTER — OFFICE VISIT (OUTPATIENT)
Dept: PAIN MANAGEMENT | Age: 52
End: 2022-03-02
Payer: COMMERCIAL

## 2022-03-02 VITALS
OXYGEN SATURATION: 95 % | TEMPERATURE: 96.4 F | HEIGHT: 73 IN | DIASTOLIC BLOOD PRESSURE: 68 MMHG | SYSTOLIC BLOOD PRESSURE: 128 MMHG | HEART RATE: 87 BPM | RESPIRATION RATE: 16 BRPM | BODY MASS INDEX: 37.77 KG/M2 | WEIGHT: 285 LBS

## 2022-03-02 DIAGNOSIS — M51.36 DDD (DEGENERATIVE DISC DISEASE), LUMBAR: ICD-10-CM

## 2022-03-02 DIAGNOSIS — M51.36 DDD (DEGENERATIVE DISC DISEASE), LUMBAR: Primary | ICD-10-CM

## 2022-03-02 DIAGNOSIS — M54.16 LUMBAR RADICULAR PAIN: ICD-10-CM

## 2022-03-02 DIAGNOSIS — M54.16 LUMBAR RADICULAR PAIN: Primary | ICD-10-CM

## 2022-03-02 DIAGNOSIS — M54.16 LUMBAR RADICULOPATHY: ICD-10-CM

## 2022-03-02 DIAGNOSIS — M47.817 LUMBOSACRAL SPONDYLOSIS WITHOUT MYELOPATHY: ICD-10-CM

## 2022-03-02 PROCEDURE — 99214 OFFICE O/P EST MOD 30 MIN: CPT | Performed by: ANESTHESIOLOGY

## 2022-03-02 PROCEDURE — 99213 OFFICE O/P EST LOW 20 MIN: CPT | Performed by: ANESTHESIOLOGY

## 2022-03-02 RX ORDER — PREGABALIN 50 MG/1
50 CAPSULE ORAL 3 TIMES DAILY
Qty: 90 CAPSULE | Refills: 2 | Status: SHIPPED
Start: 2022-03-02 | End: 2022-07-06 | Stop reason: SDUPTHER

## 2022-03-02 RX ORDER — CYCLOBENZAPRINE HCL 5 MG
5 TABLET ORAL 2 TIMES DAILY PRN
Qty: 30 TABLET | Refills: 1 | Status: SHIPPED | OUTPATIENT
Start: 2022-03-02 | End: 2022-04-01

## 2022-03-02 NOTE — PROGRESS NOTES
Pain Management  Dept: [phone]    Follow up Note      Xenia Stevens     Date of Visit:  3/2/2022     CC:  Patient presents for follow up   Chief Complaint   Patient presents with    Back Pain    Follow-up    Other     down legs     HPI:  Chronic low back pain. And left LE pain. Tried multiple modalities of treatment including -meds/ chiropractic treatment, PT.     Has been evaluated by NSG and referred for interventions. Nursing notes and details of the pain history reviewed. Please see intake notes for details. S/P Interventions had helped-  had underwent RFA on 2021 with very good pain relief. Apparently lifted a heavy object and also strained recently and has noticed new onset left LE pain with intermittent left LE give way. Denies new onset B/ B symptoms. Previous treatments:   Physical Therapy / HEP: yes      Chiropractic treatment: yes, for > 2 months recently     Medications: - NSAID's : yes                       - Membrane stabilizers : yes- gabapentin                       - Opioids : not on chronic opioids                       - Adjuvants or Others : yes     Surgeries: no LS or hip surgery      He has not been on anticoagulation medications      He has not been on herbal supplements.       He is diabetic.     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     EM/3/2022: Dr. Cash Garibay: Diagnostic Interpretation:      This study was abnormal.   Electrodiagnosis: NCS/EMG examination performed of the left and right lower extremities and needle EMG examination of the left lower extremity shows electrodiagnostic evidence of a chronic left lumbosacral radiculopathy (I.e., primarily L5/S1 myotomal distribution) with mild ongoing and chronic denervation of a mild-moderate degree.   Imaging:       MRI of left Hip: 2022:      MRI of LUMBAR spine and MRI of Thoracic spine: 1/3/2022:  Impression Lumbar spine:       At L4-L5, left foraminal disc protrusion impinges on the exiting left L4   nerve root and results in severe left neural foraminal narrowing.  Moderate   canal stenosis       Other mild degenerative changes of lumbar spine as described.       Thoracic spine MRI:       At T9-T10, moderate bilateral neural foraminal narrowing.       At T6-T7, mild canal stenosis.       At T3-T4, central disc protrusion. MRI of LS spine: 4/6/2021:         X- ray Pelvis: 7/16/2020:      FINDINGS:   No fracture or dislocation seen. Bone mineralization is normal for   age. There is joint space narrowing in the bilateral hips. Nonobstructed bowel gas pattern.  Multiple pelvic phleboliths.           Impression   Moderate degenerative changes in the bilateral hips.                Potential Aberrant Drug-Related Behavior: no     Urine Drug Screening: no    OARRS report[de-identified]  reviewed today- consistent    Past Medical History:   Diagnosis Date    Acid reflux     ADHD     Anxiety     DM [facility]di[facility] mellitus) ([facility])     Left foot drop 2/3/2022    Left leg pain 2/3/2022    Left lumbar radiculitis 2/3/2022    Low back pain     Protein in urine        Past Surgical History:   Procedure Laterality Date    ENDOSCOPY, COLON, DIAGNOSTIC      NERVE BLOCK Left 6/8/2021    LEFT LUMBAR MEDIAL BRANCH NERVE BLOCK UNDER FLUOROSCOPIC GUIDANCE AT L2, L3, L4 AND L5 DORSAL RAMI WITH SEDATION (CPT 12874) performed by Conrado Martinez MD at [facility] Left 8/19/2021    LEFT S1, S2, S3 & L5 DORSAL RAMUS  BLOCK UNDER FLUOROSCOPY performed by Conrado Martinez MD at [address] [place] [address] Left 7/1/2021    LUMBAR EPIDURAL STEROID INJECTION UNDER FLUOROSCOPIC GUIDANCE AT L4-L5 LEFT PARAMEDIAN LEFT SACROILIAC JOINT INJECTION performed by Conrado Martinez MD at [address] [place] [address] Left 12/13/2021    RADIOFREQUENCY ABLATION OF LEFT LUMBAR L4, L5, S1 UNDER FLUOROSCOPY performed by Antonio Alcala MD at [id number]  10/2020         Prior to Admission medications    Medication Sig Start Date End Date Taking? Authorizing Provider   pregabalin (LYRICA) 50 MG capsule Take 1 capsule by mouth 3 times daily for 90 days. 12/20/21 3/20/22 Yes Antonio Alcala MD   nabumetone (RELAFEN) 750 MG tablet Take 1 tablet by mouth 2 times daily as needed for Pain 10/14/21  Yes Antonio Alcala MD   LANTUS SOLOSTAR 100 UNIT/ML injection pen Inject 32 Units into the skin nightly  6/7/21  Yes Historical Provider, MD   Lidocaine 1.8 % PTCH Apply patch 12 hours and remove for 12 hours 6/24/21  Yes Antonio Alcala MD   TRULICITY 1.5 LX/2.0LY SOPN 1.5 mg once a week  10/16/20  Yes Historical Provider, MD   pioglitazone (ACTOS) 15 MG tablet take 1 tablet by mouth once daily 10/14/20  Yes Historical Provider, MD   pantoprazole (PROTONIX) 40 MG tablet Take 40 mg by mouth daily    Yes Historical Provider, MD   ALPRAZolam Otf Priestly) 0.5 MG tablet as needed. Yes Historical Provider, MD   methylphenidate (CONCERTA) 27 MG extended release tablet Take 27 mg by mouth as needed.     Yes Historical Provider, MD   glimepiride (AMARYL) 4 MG tablet Take 4 mg by mouth 2 times daily   Yes Historical Provider, MD       No Known Allergies    Social History     Socioeconomic History    Marital status:      Spouse name: Not on file    Number of children: Not on file    Years of education: Not on file    Highest education level: Not on file   Occupational History    Not on file   Tobacco Use    Smoking status: Never Smoker    Smokeless tobacco: Never Used   Vaping Use    Vaping Use: Never used   Substance and Sexual Activity    Alcohol use: Yes     Comment: Socially    Drug use: Never    Sexual activity: Not on file   Other Topics Concern    Not on file   Social History Narrative    Not on file     Social Determinants of Health Financial Resource Strain:     Difficulty of Paying Living Expenses: Not on file   Food Insecurity:     Worried About Running Out of Food in the Last Year: Not on file    Trace of Food in the Last Year: Not on file   Transportation Needs:     Lack of Transportation (Medical): Not on file    Lack of Transportation (Non-Medical): Not on file   Physical Activity:     Days of Exercise per Week: Not on file    Minutes of Exercise per Session: Not on file   Stress:     Feeling of Stress : Not on file   Social Connections:     Frequency of Communication with Friends and Family: Not on file    Frequency of Social Gatherings with Friends and Family: Not on file    Attends Jainism Services: Not on file    Active Member of [address] [place] [address] or Organizations: Not on file    Attends Club or Organization Meetings: Not on file    Marital Status: Not on file   Intimate Partner Violence:     Fear of Current or Ex-Partner: Not on file    Emotionally Abused: Not on file    Physically Abused: Not on file    Sexually Abused: Not on file   Housing Stability:     Unable to Pay for Housing in the Last Year: Not on file    Number of [place] in the Last Year: Not on file    Unstable Housing in the Last Year: Not on file       Family History   Adopted: Yes       REVIEW OF SYSTEMS:     Genaro denies fever/chills, chest pain, shortness of breath, new bowel or bladder complaints. All other review of systems was negative.     PHYSICAL EXAMINATION:      /68   Pulse 87   Temp 96.4 °F (35.8 °C) (Infrared)   Resp 16   Ht 6' 1\" (1.854 m)   Wt 285 lb (129.3 kg)   SpO2 95%   BMI 37.60 kg/m²   General:       General appearance:  Pleasant and well-hydrated, in no distress and A & O x 3  Build:Obese  Function: Rises from seated position easily and Moves about room without difficulty     HEENT:     Head:normocephalic, atraumatic       Lungs:     Breathing:normal breathing pattern      CVS: RRR     Abdomen:     Shape:obese, non-distended and normal     Cervical spine:     Inspection:normal     Thoracic spine:                Spine inspection:normal      Lumbar spine:     Spine inspection: Normal   Palpation: Tenderness paravertebral muscles Yes left  Range of motion: Decreased, flexion Decreased, Lateral bending, extension and rotation bilaterally reduced is painful. Lumbar facet tenderness  Sacroiliac joint tenderness Yes left  Piriformis tenderness: negative bilaterally  SLR : negative bilaterally  Trochanteric bursa tenderness: positive left     Musculoskeletal:     Trigger points no     Extremities:     Tremors:None bilaterally upper and lower  Edema:none x all 4 extremities     Neurological:     Sensory: Normal to light touch      Motor:                 Right Quadriceps 5/5          Left Quadriceps 5/5           Right Gastrocnemius 5/5    Left Gastrocnemius 5/5  Right Ant Tibialis 55  Left Ant Tibialis 4/5     Reflexes:    B/l equal reduced     Gait:normal Yes     Dermatology:     Skin:no rashes or lesions noted    Assessment/Plan:     Diagnosis Orders   1. Lumbosacral spondylosis without myelopathy      2. DDD (degenerative disc disease), lumbar      3. Sacroiliac dysfunction      4. Obesity, unspecified classification, unspecified obesity type, unspecified whether serious comorbidity present            54 y.o. male with h/o low back pain for > a year following an injury. Failed > 6 weeks of conservative treatment.     S/P RFA- has helped the low back/ SIJ pain. Recent exacerbation of pain and noted new onset left LE pain and left LE give way and weakness. Continues to do HEP.     MRI LS spine done recently on 1/3/2022 reviewed: L4-5 disc protrusion/ left foraminal narrowing. MRI of Left hip mild OA. Gabapentin - caused drowsiness. Has been D/ C'd. Lyrica 50 mg TID. Use instructions reviewed.  Refill given today 3/2/22    Muscle relaxant for prn use- Refill flexeril 3/2/22    EMG/ NCS- reviewed : lumbar radic. He has been evaluated by Dr. Jada Power on 2/24/2022-recommended L4-5 hemilaminectomy and discectomy Patient wants to wait for now. Will plan left lumbar TFESI L4/5 under fluoroscopy. RBA discussed. PT- script given. He has Relafen for prn use -(Caution for long term use) (BUN/ creatinine: N on 8/23/2021).     Counseling : Patient encouraged to stay active and to watch/lose weight and to do Regular home exercise program as tolerated - stretching / strengthening.      Treatment plan discussed with the patient including medication and procedure side effects.     Controlled Substances Monitoring:      OARRS reviewed- not on chronic opioids.     We discussed with the patient that combining opioids, benzodiazepines, alcohol, illicit drugs or sleep aids increases the risk of respiratory depression including death. We discussed that these medications may cause drowsiness, sedation or dizziness and have counseled the patient not to drive or operate machinery. We have discussed that these medications will be prescribed only by one provider. We have discussed with the patient about age related risk factors and have thoroughly discussed the importance of taking these medications as prescribed.  The patient verbalizes understanding.     Elinor Cardona MD    CC:  Eloy Lozoya MD Fax: [phone]    Dr. Eri Hernandez  Fax: [phone]

## 2022-03-17 ENCOUNTER — TELEPHONE (OUTPATIENT)
Dept: PAIN MANAGEMENT | Age: 52
End: 2022-03-17

## 2022-03-17 NOTE — TELEPHONE ENCOUNTER
Call to [address] and message left that procedure was scheduled for 3/31/2022 and that the surgery center should call him a few days before for the pre op call and after 3:00 PM the business day before with the arrival time. Instructed Genaro to hold ibuprofen for 24 hours, naprosyn for 4 days and any aspirin containing products or fish oil for 7 days. Instructed to call office back.

## 2022-03-29 ENCOUNTER — TELEPHONE (OUTPATIENT)
Dept: PAIN MANAGEMENT | Age: 52
End: 2022-03-29

## 2022-03-29 NOTE — TELEPHONE ENCOUNTER
Patient contacted office to advise he is in the state [facility] working on a work project and will not be able to have his procedure done; he will reschedule at a later time.  Contacted [facility] OR spoke with Arpit Reeves and cxld procedure scheduled for 03.31.2022

## 2022-04-26 ENCOUNTER — TELEPHONE (OUTPATIENT)
Dept: NEUROSURGERY | Age: 52
End: 2022-04-26

## 2022-04-26 NOTE — TELEPHONE ENCOUNTER
Pt called, has decided to go forward with surgery. Does not need another consult. Please call pt with information.

## 2022-05-05 ENCOUNTER — TELEPHONE (OUTPATIENT)
Dept: NEUROSURGERY | Age: 52
End: 2022-05-05

## 2022-05-05 NOTE — TELEPHONE ENCOUNTER
Patient has upcoming surgery and has questions about methods used for surgery and how long the procedure is. How long the scar will be? Sounds like several questions.   [phone]

## 2022-05-07 ENCOUNTER — PREP FOR PROCEDURE (OUTPATIENT)
Dept: NEUROSURGERY | Age: 52
End: 2022-05-07

## 2022-05-07 DIAGNOSIS — Z01.818 PRE-OP TESTING: Primary | ICD-10-CM

## 2022-05-07 RX ORDER — SODIUM CHLORIDE 9 MG/ML
INJECTION, SOLUTION INTRAVENOUS CONTINUOUS
Status: CANCELLED | OUTPATIENT
Start: 2022-05-07

## 2022-05-07 RX ORDER — SODIUM CHLORIDE 9 MG/ML
INJECTION, SOLUTION INTRAVENOUS PRN
Status: CANCELLED | OUTPATIENT
Start: 2022-05-07

## 2022-05-07 RX ORDER — SODIUM CHLORIDE 0.9 % (FLUSH) 0.9 %
5-40 SYRINGE (ML) INJECTION EVERY 12 HOURS SCHEDULED
Status: CANCELLED | OUTPATIENT
Start: 2022-05-07

## 2022-05-07 RX ORDER — SODIUM CHLORIDE 0.9 % (FLUSH) 0.9 %
5-40 SYRINGE (ML) INJECTION PRN
Status: CANCELLED | OUTPATIENT
Start: 2022-05-07

## 2022-05-16 RX ORDER — CYCLOBENZAPRINE HCL 5 MG
5 TABLET ORAL 2 TIMES DAILY PRN
COMMUNITY
Start: 2022-04-24 | End: 2022-05-24 | Stop reason: SDUPTHER

## 2022-05-16 RX ORDER — FLASH GLUCOSE SENSOR
1 KIT MISCELLANEOUS
COMMUNITY
Start: 2022-04-14

## 2022-05-16 RX ORDER — METHYLPHENIDATE HYDROCHLORIDE 27 MG/1
27 TABLET, EXTENDED RELEASE ORAL DAILY
COMMUNITY
Start: 2022-04-19

## 2022-05-16 NOTE — PROGRESS NOTES
[address]   PRE-ADMISSION TESTING GENERAL INSTRUCTIONS  PAT Phone Number: [phone]      GENERAL INSTRUCTIONS:    [x] Antibacterial Soap Shower Night before and/or AM of Surgery  [x] Do not wear lotions, powders, deodorant. [x] Nothing by mouth after midnight, including gum, candy, mints, or water. [x] You may brush your teeth, gargle, but do NOT swallow water. [x] No tobacco products, illegal drugs, or alcohol within 24 hours of your surgery. [x] Jewelry or valuables should not be brought to the hospital. All body and/or tongue piercing's must be removed prior to arriving to hospital. ALL hair pins must be removed. [x] Arrange transportation with a responsible adult  to and from the hospital. Arrange for someone to be with you for the remainder of the day and for 24 hours after your procedure due to having had anesthesia. Who will be your  for transportation? _Wife_________________         Who will be staying with you for 24 hrs after your procedure? _Wife_________________  [x] Bring insurance card and photo ID. [x] Bring copy of living will or healthcare power of  papers to be placed in your electronic record. [x] Transfusion Bracelet: Please bring with you to hospital, day of surgery     PARKING INSTRUCTIONS:   [x] ARRIVAL TIME: 1100, May 27th  · [x] Enter into the [employer] [facility] [employer]. Two people may accompany you. Masks are required. The marifer door will be locked you will be assisted inside. · [x] Parking Lot \"I\" is where you will park. It is located on the corner of [facility] and [address] [facility]. The entrance is on [address] [facility]. · To enter, press the button and the gate will lift. A free token will be provided to exit the lot.     EDUCATION INSTRUCTIONS:      [x] Pre-admission Testing educational folder given  [] Incentive Spirometry,coughing & deep breathing exercises reviewed  [x] Medication information sheet(s)   [x] Fluoroscopy-Xray used in surgery reviewed with patient. Educational pamphlet placed in chart. [x] Pain: Post-op pain is normal and to be expected. You will be asked to rate your pain from 0-10. [x] Ask your nurse for your pain medication. [x] Spine Navigator to see in PAT. [x] Other: NO BENDING OVER, LIFTING, OR TWISTING UNTIL PERMITTED BY SURGEON. MEDICATION INSTRUCTIONS:  [x] Bring a complete list of your medications, please write the last time you took the medicine, give this list to the nurse. [x] Take the following medications the morning of surgery with 1-2 ounces of water: PREGABALIN, PANTOPRAZOLE. [x] Stop herbal supplements and vitamins 5 days before your surgery. [x] DO NOT take any diabetic medicine the morning of surgery. Follow instructions for insulin the day before surgery- GIVE YOURSELF HALF DOSE THE NIGHT BEFORE SURGERY. [x] If you are diabetic and your blood sugar is low or you feel symptomatic, you may drink 1-2 ounces of apple juice or take a glucose tablet.            -The morning of your procedure, you may call the pre-op area if you have concerns about your blood sugar [phone]. WHAT TO EXPECT:    [x] The day of surgery you will be greeted and checked in by the [facility].  In addition, you will be registered in the [facility] by a Patient Access Representative. Please bring your photo ID and insurance card. A nurse will greet you in accordance to the time you are needed in the pre-op area to prepare you for surgery. Please do not be discouraged if you are not greeted in the order you arrive as there are many variables that are involved in patient preparation. Your patience is greatly appreciated as you wait for your nurse. Please bring in items such as: books, magazines, newspapers, electronics, or any other items  to occupy your time in the waiting area.     [x]  Delays may occur with surgery and staff will make a sincere effort to keep you informed of delays. If any delays occur with your procedure, we apologize ahead of time for your inconvenience as we recognize the value of your time.

## 2022-05-24 ENCOUNTER — HOSPITAL ENCOUNTER (OUTPATIENT)
Dept: PREADMISSION TESTING | Age: 52
Setting detail: OUTPATIENT SURGERY
Discharge: HOME | End: 2022-05-24
Payer: COMMERCIAL

## 2022-05-24 ENCOUNTER — TELEPHONE (OUTPATIENT)
Dept: NEUROSURGERY | Age: 52
End: 2022-05-24

## 2022-05-24 ENCOUNTER — HOSPITAL ENCOUNTER (OUTPATIENT)
Dept: GENERAL RADIOLOGY | Age: 52
Discharge: HOME | End: 2022-05-26
Payer: COMMERCIAL

## 2022-05-24 VITALS
HEART RATE: 87 BPM | BODY MASS INDEX: 39.94 KG/M2 | RESPIRATION RATE: 16 BRPM | TEMPERATURE: 97.4 F | HEIGHT: 73 IN | SYSTOLIC BLOOD PRESSURE: 139 MMHG | DIASTOLIC BLOOD PRESSURE: 80 MMHG | OXYGEN SATURATION: 98 % | WEIGHT: 301.4 LBS

## 2022-05-24 DIAGNOSIS — Z01.818 PRE-OP TESTING: ICD-10-CM

## 2022-05-24 DIAGNOSIS — Z01.812 PRE-OPERATIVE LABORATORY EXAMINATION: Primary | ICD-10-CM

## 2022-05-24 LAB
ABO/RH: NORMAL
ANION GAP SERPL CALCULATED.3IONS-SCNC: 14 MMOL/L (ref 7–16)
ANTIBODY SCREEN: NORMAL
BACTERIA: NORMAL /HPF
BASOPHILS ABSOLUTE: 0.07 E9/L (ref 0–0.2)
BASOPHILS RELATIVE PERCENT: 0.6 % (ref 0–2)
BILIRUBIN URINE: NEGATIVE
BLOOD, URINE: NEGATIVE
BUN BLDV-MCNC: 13 MG/DL (ref 6–20)
CALCIUM SERPL-MCNC: 9.4 MG/DL (ref 8.6–10.2)
CHLORIDE BLD-SCNC: 102 MMOL/L (ref 98–107)
CLARITY: CLEAR
CO2: 24 MMOL/L (ref 22–29)
COLOR: YELLOW
CREAT SERPL-MCNC: 1.2 MG/DL (ref 0.7–1.2)
EKG ATRIAL RATE: 81 BPM
EKG P AXIS: 28 DEGREES
EKG P-R INTERVAL: 152 MS
EKG Q-T INTERVAL: 360 MS
EKG QRS DURATION: 90 MS
EKG QTC CALCULATION (BAZETT): 418 MS
EKG R AXIS: -29 DEGREES
EKG T AXIS: 6 DEGREES
EKG VENTRICULAR RATE: 81 BPM
EOSINOPHILS ABSOLUTE: 0.25 E9/L (ref 0.05–0.5)
EOSINOPHILS RELATIVE PERCENT: 2.3 % (ref 0–6)
GFR AFRICAN AMERICAN: >60
GFR NON-AFRICAN AMERICAN: >60 ML/MIN/1.73
GLUCOSE BLD-MCNC: 160 MG/DL (ref 74–99)
GLUCOSE URINE: NEGATIVE MG/DL
HCT VFR BLD CALC: 49.2 % (ref 37–54)
HEMOGLOBIN: 16.8 G/DL (ref 12.5–16.5)
IMMATURE GRANULOCYTES #: 0.08 E9/L
IMMATURE GRANULOCYTES %: 0.7 % (ref 0–5)
INR BLD: 1
KETONES, URINE: NEGATIVE MG/DL
LEUKOCYTE ESTERASE, URINE: NEGATIVE
LYMPHOCYTES ABSOLUTE: 1.92 E9/L (ref 1.5–4)
LYMPHOCYTES RELATIVE PERCENT: 17.3 % (ref 20–42)
MCH RBC QN AUTO: 30.3 PG (ref 26–35)
MCHC RBC AUTO-ENTMCNC: 34.1 % (ref 32–34.5)
MCV RBC AUTO: 88.8 FL (ref 80–99.9)
MONOCYTES ABSOLUTE: 1.29 E9/L (ref 0.1–0.95)
MONOCYTES RELATIVE PERCENT: 11.6 % (ref 2–12)
NEUTROPHILS ABSOLUTE: 7.48 E9/L (ref 1.8–7.3)
NEUTROPHILS RELATIVE PERCENT: 67.5 % (ref 43–80)
NITRITE, URINE: NEGATIVE
PDW BLD-RTO: 13.2 FL (ref 11.5–15)
PH UA: 6 (ref 5–9)
PLATELET # BLD: 329 E9/L (ref 130–450)
PMV BLD AUTO: 9.5 FL (ref 7–12)
POTASSIUM REFLEX MAGNESIUM: 4.2 MMOL/L (ref 3.5–5)
PROTEIN UA: 100 MG/DL
PROTHROMBIN TIME: 10.9 SEC (ref 9.3–12.4)
RBC # BLD: 5.54 E12/L (ref 3.8–5.8)
RBC UA: NORMAL /HPF (ref 0–2)
SODIUM BLD-SCNC: 140 MMOL/L (ref 132–146)
SPECIFIC GRAVITY UA: >=1.03 (ref 1–1.03)
UROBILINOGEN, URINE: 0.2 E.U./DL
WBC # BLD: 11.1 E9/L (ref 4.5–11.5)
WBC UA: NORMAL /HPF (ref 0–5)

## 2022-05-24 PROCEDURE — 80048 BASIC METABOLIC PNL TOTAL CA: CPT

## 2022-05-24 PROCEDURE — 87088 URINE BACTERIA CULTURE: CPT

## 2022-05-24 PROCEDURE — 85610 PROTHROMBIN TIME: CPT

## 2022-05-24 PROCEDURE — 87081 CULTURE SCREEN ONLY: CPT

## 2022-05-24 PROCEDURE — 86900 BLOOD TYPING SEROLOGIC ABO: CPT

## 2022-05-24 PROCEDURE — 93005 ELECTROCARDIOGRAM TRACING: CPT

## 2022-05-24 PROCEDURE — 86850 RBC ANTIBODY SCREEN: CPT

## 2022-05-24 PROCEDURE — 36415 COLL VENOUS BLD VENIPUNCTURE: CPT

## 2022-05-24 PROCEDURE — 85025 COMPLETE CBC W/AUTO DIFF WBC: CPT

## 2022-05-24 PROCEDURE — 86901 BLOOD TYPING SEROLOGIC RH(D): CPT

## 2022-05-24 PROCEDURE — 81001 URINALYSIS AUTO W/SCOPE: CPT

## 2022-05-24 PROCEDURE — 71046 X-RAY EXAM CHEST 2 VIEWS: CPT

## 2022-05-24 RX ORDER — CYCLOBENZAPRINE HCL 5 MG
5 TABLET ORAL 2 TIMES DAILY PRN
Qty: 30 TABLET | Refills: 0 | Status: SHIPPED
Start: 2022-05-24 | End: 2022-06-06 | Stop reason: SDUPTHER

## 2022-05-24 NOTE — TELEPHONE ENCOUNTER
Patient would like a refill of Flexeril 5 mg sent to [facility] in [facility]. He is having surgery this Friday.

## 2022-05-25 LAB — MRSA CULTURE ONLY: NORMAL

## 2022-05-26 ENCOUNTER — ANESTHESIA EVENT (OUTPATIENT)
Dept: OPERATING ROOM | Age: 52
End: 2022-05-26
Payer: COMMERCIAL

## 2022-05-26 LAB — URINE CULTURE, ROUTINE: NORMAL

## 2022-05-26 NOTE — H&P
History of Present Illness: This is a 55 yo male who presents with back pain x 1 year s/p lifting injury. States he heard and felt a \"pop\" at the time of the incident. C/o 6-7/10 pain on average, constant, and a combination of descriptors. Admits to radiation into the left hip and groin--states his hip and groin pain tend to be at the worse. F/U with GS to r/o hernia--no surgical issues. Admits to some n/t following the same pattern. Denies leg weakness. Has tried PT and chiropractic care with some relief. Denies seeing a Pain Specialist. No loss of bowel or bladder function. Ambulating independently.      Allergies:   Patient has no known allergies.     Past Medical History:  Past Medical History             Diagnosis Date    Acid reflux      DM (diabetes mellitus) ([facility] [address]      Protein in urine              Surgical History:  Past Surgical History             Procedure Laterality Date    PATELLA SURGERY Right      UPPER GASTROINTESTINAL ENDOSCOPY   10/2020            Social History:   reports that he has never smoked.  He has never used smokeless tobacco.   has no history on file for alcohol.     Family History:  Family History   No family history on file.        Review of Systems:  Denies fever, chills, or night sweats  Denies headache, dizziness, syncope  Denies blurred vision, double vision  Denies chest pain, palpitations, SOB  Denies diarrhea, constipation, n/v  Denies dysuria, hematuria  Denies recent infections  Denies easy bruising  Denies anxiety, depression     Physical Exam:  WDWN, resting comfortable, no apparent distress  Appears stated age  Vitals stable  Non-labored breathing   A&O x 3, normal affect   Head is normocephalic, atraumatic   No palpable lymphadenopathy   Abdomen soft, nontender  Pupils equal and reactive, no scleral icterus  EOMI bilaterally  Cranial nerves II-XII intact bilaterally  No drift  5/5 in BUE  5/5 in BLE  Sensation to LT intact x 4 ext  Toes going down  Skin warm and dry  +Left CARMELA     Review of Imaging: None      Assessment: Patient with back and left hip pain. Stable.      Plan:  I am recommending a left L4-L5 hemilaminotomy and diskectomy. R/B/A of surgery have been discussed and he wishes to proceed.

## 2022-05-27 ENCOUNTER — HOSPITAL ENCOUNTER (OUTPATIENT)
Age: 52
Setting detail: OBSERVATION
Discharge: HOME | End: 2022-05-28
Attending: NEUROLOGICAL SURGERY | Admitting: NEUROLOGICAL SURGERY
Payer: COMMERCIAL

## 2022-05-27 ENCOUNTER — ANESTHESIA (OUTPATIENT)
Dept: OPERATING ROOM | Age: 52
End: 2022-05-27
Payer: COMMERCIAL

## 2022-05-27 ENCOUNTER — APPOINTMENT (OUTPATIENT)
Dept: GENERAL RADIOLOGY | Age: 52
End: 2022-05-27
Attending: NEUROLOGICAL SURGERY
Payer: COMMERCIAL

## 2022-05-27 DIAGNOSIS — M51.26 LUMBAR HERNIATED DISC: Primary | ICD-10-CM

## 2022-05-27 DIAGNOSIS — Z01.812 PRE-OPERATIVE LABORATORY EXAMINATION: ICD-10-CM

## 2022-05-27 LAB
METER GLUCOSE: 107 MG/DL (ref 74–99)
METER GLUCOSE: 108 MG/DL (ref 74–99)
METER GLUCOSE: 114 MG/DL (ref 74–99)
METER GLUCOSE: 118 MG/DL (ref 74–99)
METER GLUCOSE: 270 MG/DL (ref 74–99)
METER GLUCOSE: 69 MG/DL (ref 74–99)
METER GLUCOSE: 97 MG/DL (ref 74–99)

## 2022-05-27 PROCEDURE — 7100000000 HC PACU RECOVERY - FIRST 15 MIN: Performed by: NEUROLOGICAL SURGERY

## 2022-05-27 PROCEDURE — 3209999900 FLUORO FOR SURGICAL PROCEDURES

## 2022-05-27 PROCEDURE — 2709999900 HC NON-CHARGEABLE SUPPLY: Performed by: NEUROLOGICAL SURGERY

## 2022-05-27 PROCEDURE — 3600000004 HC SURGERY LEVEL 4 BASE: Performed by: NEUROLOGICAL SURGERY

## 2022-05-27 PROCEDURE — 7100000001 HC PACU RECOVERY - ADDTL 15 MIN: Performed by: NEUROLOGICAL SURGERY

## 2022-05-27 PROCEDURE — 2500000003 HC RX 250 WO HCPCS

## 2022-05-27 PROCEDURE — 63030 LAMOT DCMPRN NRV RT 1 LMBR: CPT | Performed by: NEUROLOGICAL SURGERY

## 2022-05-27 PROCEDURE — 82962 GLUCOSE BLOOD TEST: CPT

## 2022-05-27 PROCEDURE — 3600000014 HC SURGERY LEVEL 4 ADDTL 15MIN: Performed by: NEUROLOGICAL SURGERY

## 2022-05-27 PROCEDURE — 2720000010 HC SURG SUPPLY STERILE: Performed by: NEUROLOGICAL SURGERY

## 2022-05-27 PROCEDURE — 6360000002 HC RX W HCPCS: Performed by: PHYSICIAN ASSISTANT

## 2022-05-27 PROCEDURE — A4217 STERILE WATER/SALINE, 500 ML: HCPCS | Performed by: NEUROLOGICAL SURGERY

## 2022-05-27 PROCEDURE — 2580000003 HC RX 258: Performed by: PHYSICIAN ASSISTANT

## 2022-05-27 PROCEDURE — 6370000000 HC RX 637 (ALT 250 FOR IP): Performed by: NEUROLOGICAL SURGERY

## 2022-05-27 PROCEDURE — 2500000003 HC RX 250 WO HCPCS: Performed by: NEUROLOGICAL SURGERY

## 2022-05-27 PROCEDURE — 3700000000 HC ANESTHESIA ATTENDED CARE: Performed by: NEUROLOGICAL SURGERY

## 2022-05-27 PROCEDURE — 2580000003 HC RX 258

## 2022-05-27 PROCEDURE — 6360000002 HC RX W HCPCS: Performed by: NEUROLOGICAL SURGERY

## 2022-05-27 PROCEDURE — 63030 LAMOT DCMPRN NRV RT 1 LMBR: CPT | Performed by: PHYSICIAN ASSISTANT

## 2022-05-27 PROCEDURE — G0378 HOSPITAL OBSERVATION PER HR: HCPCS

## 2022-05-27 PROCEDURE — 2580000003 HC RX 258: Performed by: NEUROLOGICAL SURGERY

## 2022-05-27 PROCEDURE — 88304 TISSUE EXAM BY PATHOLOGIST: CPT

## 2022-05-27 PROCEDURE — 6360000002 HC RX W HCPCS: Performed by: ANESTHESIOLOGY

## 2022-05-27 PROCEDURE — 6360000002 HC RX W HCPCS

## 2022-05-27 PROCEDURE — 3700000001 HC ADD 15 MINUTES (ANESTHESIA): Performed by: NEUROLOGICAL SURGERY

## 2022-05-27 RX ORDER — DEXAMETHASONE SODIUM PHOSPHATE 10 MG/ML
INJECTION INTRAMUSCULAR; INTRAVENOUS PRN
Status: DISCONTINUED | OUTPATIENT
Start: 2022-05-27 | End: 2022-05-27 | Stop reason: SDUPTHER

## 2022-05-27 RX ORDER — SODIUM CHLORIDE 0.9 % (FLUSH) 0.9 %
5-40 SYRINGE (ML) INJECTION PRN
Status: DISCONTINUED | OUTPATIENT
Start: 2022-05-27 | End: 2022-05-28 | Stop reason: HOSPADM

## 2022-05-27 RX ORDER — DEXTROSE MONOHYDRATE 25 G/50ML
INJECTION, SOLUTION INTRAVENOUS
Status: COMPLETED
Start: 2022-05-27 | End: 2022-05-27

## 2022-05-27 RX ORDER — ROCURONIUM BROMIDE 10 MG/ML
INJECTION, SOLUTION INTRAVENOUS PRN
Status: DISCONTINUED | OUTPATIENT
Start: 2022-05-27 | End: 2022-05-27 | Stop reason: SDUPTHER

## 2022-05-27 RX ORDER — ONDANSETRON 2 MG/ML
4 INJECTION INTRAMUSCULAR; INTRAVENOUS EVERY 6 HOURS PRN
Status: DISCONTINUED | OUTPATIENT
Start: 2022-05-27 | End: 2022-05-28 | Stop reason: HOSPADM

## 2022-05-27 RX ORDER — ONDANSETRON 4 MG/1
4 TABLET, ORALLY DISINTEGRATING ORAL EVERY 8 HOURS PRN
Status: DISCONTINUED | OUTPATIENT
Start: 2022-05-27 | End: 2022-05-28 | Stop reason: HOSPADM

## 2022-05-27 RX ORDER — SODIUM CHLORIDE 0.9 % (FLUSH) 0.9 %
5-40 SYRINGE (ML) INJECTION EVERY 12 HOURS SCHEDULED
Status: DISCONTINUED | OUTPATIENT
Start: 2022-05-27 | End: 2022-05-27 | Stop reason: HOSPADM

## 2022-05-27 RX ORDER — OXYCODONE HYDROCHLORIDE 5 MG/1
5 TABLET ORAL EVERY 4 HOURS PRN
Status: DISCONTINUED | OUTPATIENT
Start: 2022-05-27 | End: 2022-05-28 | Stop reason: HOSPADM

## 2022-05-27 RX ORDER — OXYCODONE HYDROCHLORIDE 10 MG/1
10 TABLET ORAL EVERY 4 HOURS PRN
Status: DISCONTINUED | OUTPATIENT
Start: 2022-05-27 | End: 2022-05-28 | Stop reason: HOSPADM

## 2022-05-27 RX ORDER — BISACODYL 10 MG
10 SUPPOSITORY, RECTAL RECTAL DAILY PRN
Status: DISCONTINUED | OUTPATIENT
Start: 2022-05-27 | End: 2022-05-28 | Stop reason: HOSPADM

## 2022-05-27 RX ORDER — SODIUM CHLORIDE 9 MG/ML
INJECTION, SOLUTION INTRAVENOUS PRN
Status: DISCONTINUED | OUTPATIENT
Start: 2022-05-27 | End: 2022-05-27 | Stop reason: HOSPADM

## 2022-05-27 RX ORDER — GLIPIZIDE 5 MG/1
10 TABLET ORAL 2 TIMES DAILY WITH MEALS
Status: DISCONTINUED | OUTPATIENT
Start: 2022-05-28 | End: 2022-05-28 | Stop reason: HOSPADM

## 2022-05-27 RX ORDER — DEXTROSE MONOHYDRATE 25 G/50ML
INJECTION, SOLUTION INTRAVENOUS PRN
Status: DISCONTINUED | OUTPATIENT
Start: 2022-05-27 | End: 2022-05-27 | Stop reason: SDUPTHER

## 2022-05-27 RX ORDER — SENNA AND DOCUSATE SODIUM 50; 8.6 MG/1; MG/1
1 TABLET, FILM COATED ORAL 2 TIMES DAILY
Status: DISCONTINUED | OUTPATIENT
Start: 2022-05-27 | End: 2022-05-28 | Stop reason: HOSPADM

## 2022-05-27 RX ORDER — CYCLOBENZAPRINE HCL 10 MG
10 TABLET ORAL 3 TIMES DAILY PRN
Status: DISCONTINUED | OUTPATIENT
Start: 2022-05-27 | End: 2022-05-28 | Stop reason: HOSPADM

## 2022-05-27 RX ORDER — FENTANYL CITRATE 50 UG/ML
INJECTION, SOLUTION INTRAMUSCULAR; INTRAVENOUS PRN
Status: DISCONTINUED | OUTPATIENT
Start: 2022-05-27 | End: 2022-05-27 | Stop reason: SDUPTHER

## 2022-05-27 RX ORDER — PIOGLITAZONEHYDROCHLORIDE 15 MG/1
15 TABLET ORAL DAILY
Status: DISCONTINUED | OUTPATIENT
Start: 2022-05-28 | End: 2022-05-28 | Stop reason: HOSPADM

## 2022-05-27 RX ORDER — SODIUM PHOSPHATE, DIBASIC AND SODIUM PHOSPHATE, MONOBASIC 7; 19 G/133ML; G/133ML
1 ENEMA RECTAL DAILY PRN
Status: DISCONTINUED | OUTPATIENT
Start: 2022-05-27 | End: 2022-05-28 | Stop reason: HOSPADM

## 2022-05-27 RX ORDER — SODIUM CHLORIDE 9 MG/ML
INJECTION, SOLUTION INTRAVENOUS CONTINUOUS
Status: DISCONTINUED | OUTPATIENT
Start: 2022-05-27 | End: 2022-05-27 | Stop reason: HOSPADM

## 2022-05-27 RX ORDER — SODIUM CHLORIDE 0.9 % (FLUSH) 0.9 %
5-40 SYRINGE (ML) INJECTION EVERY 12 HOURS SCHEDULED
Status: DISCONTINUED | OUTPATIENT
Start: 2022-05-27 | End: 2022-05-28 | Stop reason: HOSPADM

## 2022-05-27 RX ORDER — ALPRAZOLAM 0.25 MG/1
0.5 TABLET ORAL 3 TIMES DAILY PRN
Status: DISCONTINUED | OUTPATIENT
Start: 2022-05-27 | End: 2022-05-28 | Stop reason: HOSPADM

## 2022-05-27 RX ORDER — BUPIVACAINE HYDROCHLORIDE 2.5 MG/ML
INJECTION, SOLUTION EPIDURAL; INFILTRATION; INTRACAUDAL PRN
Status: DISCONTINUED | OUTPATIENT
Start: 2022-05-27 | End: 2022-05-27 | Stop reason: ALTCHOICE

## 2022-05-27 RX ORDER — LIDOCAINE HYDROCHLORIDE AND EPINEPHRINE 5; 5 MG/ML; UG/ML
INJECTION, SOLUTION INFILTRATION; PERINEURAL PRN
Status: DISCONTINUED | OUTPATIENT
Start: 2022-05-27 | End: 2022-05-27 | Stop reason: ALTCHOICE

## 2022-05-27 RX ORDER — MORPHINE SULFATE 2 MG/ML
2 INJECTION, SOLUTION INTRAMUSCULAR; INTRAVENOUS
Status: DISCONTINUED | OUTPATIENT
Start: 2022-05-27 | End: 2022-05-28 | Stop reason: HOSPADM

## 2022-05-27 RX ORDER — VANCOMYCIN HYDROCHLORIDE 500 MG/10ML
INJECTION, POWDER, LYOPHILIZED, FOR SOLUTION INTRAVENOUS PRN
Status: DISCONTINUED | OUTPATIENT
Start: 2022-05-27 | End: 2022-05-27 | Stop reason: ALTCHOICE

## 2022-05-27 RX ORDER — MEPERIDINE HYDROCHLORIDE 25 MG/ML
12.5 INJECTION INTRAMUSCULAR; INTRAVENOUS; SUBCUTANEOUS EVERY 5 MIN PRN
Status: DISCONTINUED | OUTPATIENT
Start: 2022-05-27 | End: 2022-05-27 | Stop reason: HOSPADM

## 2022-05-27 RX ORDER — SODIUM CHLORIDE 0.9 % (FLUSH) 0.9 %
5-40 SYRINGE (ML) INJECTION PRN
Status: DISCONTINUED | OUTPATIENT
Start: 2022-05-27 | End: 2022-05-27 | Stop reason: HOSPADM

## 2022-05-27 RX ORDER — MIDAZOLAM HYDROCHLORIDE 1 MG/ML
INJECTION INTRAMUSCULAR; INTRAVENOUS PRN
Status: DISCONTINUED | OUTPATIENT
Start: 2022-05-27 | End: 2022-05-27 | Stop reason: SDUPTHER

## 2022-05-27 RX ORDER — GLYCOPYRROLATE 1 MG/5 ML
SYRINGE (ML) INTRAVENOUS PRN
Status: DISCONTINUED | OUTPATIENT
Start: 2022-05-27 | End: 2022-05-27 | Stop reason: SDUPTHER

## 2022-05-27 RX ORDER — SODIUM CHLORIDE 9 MG/ML
INJECTION, SOLUTION INTRAVENOUS CONTINUOUS
Status: DISCONTINUED | OUTPATIENT
Start: 2022-05-27 | End: 2022-05-28 | Stop reason: HOSPADM

## 2022-05-27 RX ORDER — ONDANSETRON 2 MG/ML
INJECTION INTRAMUSCULAR; INTRAVENOUS PRN
Status: DISCONTINUED | OUTPATIENT
Start: 2022-05-27 | End: 2022-05-27 | Stop reason: SDUPTHER

## 2022-05-27 RX ORDER — POLYETHYLENE GLYCOL 3350 17 G/17G
17 POWDER, FOR SOLUTION ORAL DAILY
Status: DISCONTINUED | OUTPATIENT
Start: 2022-05-27 | End: 2022-05-28 | Stop reason: HOSPADM

## 2022-05-27 RX ORDER — SODIUM CHLORIDE 9 MG/ML
INJECTION, SOLUTION INTRAVENOUS PRN
Status: DISCONTINUED | OUTPATIENT
Start: 2022-05-27 | End: 2022-05-28 | Stop reason: HOSPADM

## 2022-05-27 RX ORDER — MORPHINE SULFATE 4 MG/ML
4 INJECTION, SOLUTION INTRAMUSCULAR; INTRAVENOUS
Status: DISCONTINUED | OUTPATIENT
Start: 2022-05-27 | End: 2022-05-28 | Stop reason: HOSPADM

## 2022-05-27 RX ORDER — LIDOCAINE HYDROCHLORIDE 20 MG/ML
INJECTION, SOLUTION INTRAVENOUS PRN
Status: DISCONTINUED | OUTPATIENT
Start: 2022-05-27 | End: 2022-05-27 | Stop reason: SDUPTHER

## 2022-05-27 RX ORDER — PREGABALIN 50 MG/1
50 CAPSULE ORAL 3 TIMES DAILY
Status: DISCONTINUED | OUTPATIENT
Start: 2022-05-27 | End: 2022-05-28 | Stop reason: HOSPADM

## 2022-05-27 RX ORDER — METHYLPHENIDATE HYDROCHLORIDE 27 MG/1
27 TABLET, EXTENDED RELEASE ORAL DAILY
Status: DISCONTINUED | OUTPATIENT
Start: 2022-05-27 | End: 2022-05-28 | Stop reason: HOSPADM

## 2022-05-27 RX ORDER — DOCUSATE SODIUM 100 MG/1
200 CAPSULE, LIQUID FILLED ORAL 2 TIMES DAILY
Status: DISCONTINUED | OUTPATIENT
Start: 2022-05-27 | End: 2022-05-28 | Stop reason: HOSPADM

## 2022-05-27 RX ORDER — SODIUM CHLORIDE 9 MG/ML
INJECTION, SOLUTION INTRAVENOUS CONTINUOUS PRN
Status: DISCONTINUED | OUTPATIENT
Start: 2022-05-27 | End: 2022-05-27 | Stop reason: SDUPTHER

## 2022-05-27 RX ORDER — PROPOFOL 10 MG/ML
INJECTION, EMULSION INTRAVENOUS PRN
Status: DISCONTINUED | OUTPATIENT
Start: 2022-05-27 | End: 2022-05-27 | Stop reason: SDUPTHER

## 2022-05-27 RX ORDER — PANTOPRAZOLE SODIUM 40 MG/1
40 TABLET, DELAYED RELEASE ORAL DAILY
Status: DISCONTINUED | OUTPATIENT
Start: 2022-05-27 | End: 2022-05-28 | Stop reason: HOSPADM

## 2022-05-27 RX ORDER — NEOSTIGMINE METHYLSULFATE 1 MG/ML
INJECTION, SOLUTION INTRAVENOUS PRN
Status: DISCONTINUED | OUTPATIENT
Start: 2022-05-27 | End: 2022-05-27 | Stop reason: SDUPTHER

## 2022-05-27 RX ORDER — MORPHINE SULFATE 2 MG/ML
2 INJECTION, SOLUTION INTRAMUSCULAR; INTRAVENOUS EVERY 5 MIN PRN
Status: COMPLETED | OUTPATIENT
Start: 2022-05-27 | End: 2022-05-27

## 2022-05-27 RX ORDER — DEXTROSE MONOHYDRATE 50 MG/ML
100 INJECTION, SOLUTION INTRAVENOUS PRN
Status: DISCONTINUED | OUTPATIENT
Start: 2022-05-27 | End: 2022-05-28 | Stop reason: HOSPADM

## 2022-05-27 RX ORDER — MORPHINE SULFATE 2 MG/ML
1 INJECTION, SOLUTION INTRAMUSCULAR; INTRAVENOUS EVERY 5 MIN PRN
Status: DISCONTINUED | OUTPATIENT
Start: 2022-05-27 | End: 2022-05-27 | Stop reason: HOSPADM

## 2022-05-27 RX ORDER — ACETAMINOPHEN 325 MG/1
650 TABLET ORAL EVERY 6 HOURS
Status: DISCONTINUED | OUTPATIENT
Start: 2022-05-27 | End: 2022-05-28 | Stop reason: HOSPADM

## 2022-05-27 RX ADMIN — Medication 3 MG: at 16:25

## 2022-05-27 RX ADMIN — ONDANSETRON 4 MG: 2 INJECTION INTRAMUSCULAR; INTRAVENOUS at 16:04

## 2022-05-27 RX ADMIN — FENTANYL CITRATE 100 MCG: 50 INJECTION, SOLUTION INTRAMUSCULAR; INTRAVENOUS at 14:40

## 2022-05-27 RX ADMIN — MORPHINE SULFATE 2 MG: 2 INJECTION, SOLUTION INTRAMUSCULAR; INTRAVENOUS at 17:03

## 2022-05-27 RX ADMIN — PROPOFOL 20 MG: 10 INJECTION, EMULSION INTRAVENOUS at 16:15

## 2022-05-27 RX ADMIN — DEXAMETHASONE SODIUM PHOSPHATE 10 MG: 10 INJECTION INTRAMUSCULAR; INTRAVENOUS at 14:58

## 2022-05-27 RX ADMIN — DEXTROSE MONOHYDRATE 12.5 G: 25 INJECTION, SOLUTION INTRAVENOUS at 14:45

## 2022-05-27 RX ADMIN — ROCURONIUM BROMIDE 20 MG: 10 SOLUTION INTRAVENOUS at 15:09

## 2022-05-27 RX ADMIN — MORPHINE SULFATE 2 MG: 2 INJECTION, SOLUTION INTRAMUSCULAR; INTRAVENOUS at 17:38

## 2022-05-27 RX ADMIN — SODIUM CHLORIDE, PRESERVATIVE FREE 10 ML: 5 INJECTION INTRAVENOUS at 21:46

## 2022-05-27 RX ADMIN — BISACODYL 5 MG: 5 TABLET, COATED ORAL at 20:24

## 2022-05-27 RX ADMIN — PROPOFOL 200 MG: 10 INJECTION, EMULSION INTRAVENOUS at 14:40

## 2022-05-27 RX ADMIN — CYCLOBENZAPRINE 10 MG: 10 TABLET, FILM COATED ORAL at 21:45

## 2022-05-27 RX ADMIN — OXYCODONE HYDROCHLORIDE 10 MG: 10 TABLET ORAL at 20:11

## 2022-05-27 RX ADMIN — DOCUSATE SODIUM 200 MG: 100 CAPSULE, LIQUID FILLED ORAL at 21:45

## 2022-05-27 RX ADMIN — DEXTROSE MONOHYDRATE 12.5 G: 25 INJECTION, SOLUTION INTRAVENOUS at 15:55

## 2022-05-27 RX ADMIN — FENTANYL CITRATE 150 MCG: 50 INJECTION, SOLUTION INTRAMUSCULAR; INTRAVENOUS at 15:09

## 2022-05-27 RX ADMIN — LIDOCAINE HYDROCHLORIDE 100 MG: 20 INJECTION, SOLUTION INTRAVENOUS at 14:40

## 2022-05-27 RX ADMIN — PREGABALIN 50 MG: 50 CAPSULE ORAL at 21:45

## 2022-05-27 RX ADMIN — CEFAZOLIN SODIUM 3000 MG: 10 INJECTION, POWDER, FOR SOLUTION INTRAVENOUS at 14:49

## 2022-05-27 RX ADMIN — BENZOCAINE AND MENTHOL 1 LOZENGE: 15; 3.6 LOZENGE ORAL at 22:56

## 2022-05-27 RX ADMIN — MORPHINE SULFATE 2 MG: 2 INJECTION, SOLUTION INTRAMUSCULAR; INTRAVENOUS at 16:58

## 2022-05-27 RX ADMIN — ACETAMINOPHEN 650 MG: 325 TABLET ORAL at 20:24

## 2022-05-27 RX ADMIN — MORPHINE SULFATE 4 MG: 4 INJECTION, SOLUTION INTRAMUSCULAR; INTRAVENOUS at 21:45

## 2022-05-27 RX ADMIN — MIDAZOLAM 2 MG: 1 INJECTION INTRAMUSCULAR; INTRAVENOUS at 14:34

## 2022-05-27 RX ADMIN — MORPHINE SULFATE 2 MG: 2 INJECTION, SOLUTION INTRAMUSCULAR; INTRAVENOUS at 17:15

## 2022-05-27 RX ADMIN — POLYETHYLENE GLYCOL 3350 17 G: 17 POWDER, FOR SOLUTION ORAL at 20:11

## 2022-05-27 RX ADMIN — ROCURONIUM BROMIDE 50 MG: 10 SOLUTION INTRAVENOUS at 14:40

## 2022-05-27 RX ADMIN — SODIUM CHLORIDE: 9 INJECTION, SOLUTION INTRAVENOUS at 20:24

## 2022-05-27 RX ADMIN — Medication 0.6 MG: at 16:25

## 2022-05-27 RX ADMIN — MEPERIDINE HYDROCHLORIDE 12.5 MG: 25 INJECTION INTRAMUSCULAR; INTRAVENOUS; SUBCUTANEOUS at 16:49

## 2022-05-27 RX ADMIN — MEPERIDINE HYDROCHLORIDE 12.5 MG: 25 INJECTION INTRAMUSCULAR; INTRAVENOUS; SUBCUTANEOUS at 16:54

## 2022-05-27 RX ADMIN — MORPHINE SULFATE 2 MG: 2 INJECTION, SOLUTION INTRAMUSCULAR; INTRAVENOUS at 17:10

## 2022-05-27 RX ADMIN — SENNOSIDES AND DOCUSATE SODIUM 1 TABLET: 50; 8.6 TABLET ORAL at 21:45

## 2022-05-27 RX ADMIN — SODIUM CHLORIDE: 9 INJECTION, SOLUTION INTRAVENOUS at 14:38

## 2022-05-27 ASSESSMENT — PAIN DESCRIPTION - LOCATION
LOCATION: BACK

## 2022-05-27 ASSESSMENT — PAIN DESCRIPTION - DESCRIPTORS
DESCRIPTORS: ACHING;BURNING;SHARP;SORE
DESCRIPTORS: ACHING;DISCOMFORT
DESCRIPTORS: ACHING;BURNING;CRAMPING
DESCRIPTORS: ACHING;DISCOMFORT
DESCRIPTORS: ACHING;BURNING;CRAMPING
DESCRIPTORS: ACHING;DISCOMFORT
DESCRIPTORS: ACHING;DISCOMFORT;BURNING;SHARP
DESCRIPTORS: ACHING;DISCOMFORT;NAGGING
DESCRIPTORS: ACHING;BURNING;SHARP;STABBING

## 2022-05-27 ASSESSMENT — PAIN DESCRIPTION - FREQUENCY
FREQUENCY: CONTINUOUS
FREQUENCY: INTERMITTENT

## 2022-05-27 ASSESSMENT — PAIN DESCRIPTION - ORIENTATION
ORIENTATION: LOWER;POSTERIOR
ORIENTATION: RIGHT;LEFT
ORIENTATION: LOWER;POSTERIOR
ORIENTATION: LOWER
ORIENTATION: MID
ORIENTATION: LOWER;POSTERIOR
ORIENTATION: MID

## 2022-05-27 ASSESSMENT — PAIN SCALES - GENERAL
PAINLEVEL_OUTOF10: 5
PAINLEVEL_OUTOF10: 8
PAINLEVEL_OUTOF10: 3
PAINLEVEL_OUTOF10: 7
PAINLEVEL_OUTOF10: 0
PAINLEVEL_OUTOF10: 9
PAINLEVEL_OUTOF10: 7
PAINLEVEL_OUTOF10: 9
PAINLEVEL_OUTOF10: 8
PAINLEVEL_OUTOF10: 9
PAINLEVEL_OUTOF10: 7
PAINLEVEL_OUTOF10: 7

## 2022-05-27 ASSESSMENT — PAIN - FUNCTIONAL ASSESSMENT
PAIN_FUNCTIONAL_ASSESSMENT: PREVENTS OR INTERFERES SOME ACTIVE ACTIVITIES AND ADLS

## 2022-05-27 ASSESSMENT — PAIN DESCRIPTION - PAIN TYPE
TYPE: SURGICAL PAIN

## 2022-05-27 ASSESSMENT — PAIN DESCRIPTION - ONSET
ONSET: ON-GOING

## 2022-05-27 ASSESSMENT — ENCOUNTER SYMPTOMS: SHORTNESS OF BREATH: 0

## 2022-05-27 ASSESSMENT — LIFESTYLE VARIABLES: SMOKING_STATUS: 0

## 2022-05-27 NOTE — PROGRESS NOTES
Kg Parish was ordered Methylphenidate CR  (Concerta) 38XB which is a nonformulary medication. This medication will need to be supplied by the patient as the pharmacy does not carry this non-formulary medication. If the medication has not been administered by 1400 on the following day from the time the order was placed, a pharmacist will follow-up with the nurse of the patient to assess the capability of the patient to bring in the medication. If it is determined that the patient cannot supply the medication and it is not available to be dispensed from the pharmacy, the provider will be notified.

## 2022-05-27 NOTE — ANESTHESIA PRE PROCEDURE
Department of Anesthesiology  Preprocedure Note       Name:  Rubina Huang   Age:  54 y.o.  :  1970                                          MRN:  [id number]         Date:  2022      Surgeon: Elaina Gonzalez):  Anthony Booker MD    Procedure: Procedure(s):  LEFT L4-L5 HEMILAMINECTOMY AND DISCECTOMY - C ARM, CODY TABLE    Medications prior to admission:   Prior to Admission medications    Medication Sig Start Date End Date Taking? Authorizing Provider   cyclobenzaprine (FLEXERIL) 5 MG tablet Take 1 tablet by mouth 2 times daily as needed for Muscle spasms Usually takes only once a day 22   Yasir Peace PA-C   methylphenidate 27 MG CR tablet Take 27 mg by mouth daily. 22   Historical Provider, MD   Continuous Blood Gluc Sensor (FREESTYLE VIDAL 2 SENSOR) MISC Inject 1 patch into the skin every 14 days 22   Historical Provider, MD   pregabalin (LYRICA) 50 MG capsule Take 1 capsule by mouth 3 times daily for 90 days. 3/2/22 5/31/22  Debbe Lennox, MD   LANTUS SOLOSTAR 100 UNIT/ML injection pen Inject 32 Units into the skin nightly  21   Historical Provider, MD   Lidocaine 1.8 % PTCH Apply patch 12 hours and remove for 12 hours 21   Debbe Lennox, MD   TRULICITY 1.5 FQ/3.3FK SOPN 1.5 mg once a week Sukh 10/16/20   Historical Provider, MD   pioglitazone (ACTOS) 15 MG tablet take 1 tablet by mouth once daily 10/14/20   Historical Provider, MD   pantoprazole (PROTONIX) 40 MG tablet Take 40 mg by mouth daily     Historical Provider, MD   ALPRAZolam Stacy Reid) 0.5 MG tablet as needed. Historical Provider, MD   glimepiride (AMARYL) 4 MG tablet Take 4 mg by mouth 2 times daily    Historical Provider, MD       Current medications:    No current facility-administered medications for this visit. No current outpatient medications on file.      Facility-Administered Medications Ordered in Other Visits   Medication Dose Route Frequency Provider Last Rate Last Admin    0.9 % sodium chloride infusion   IntraVENous PRN Ngozi Porras PA-C        0.9 % sodium chloride infusion   IntraVENous Continuous Neli Myles PA-C        ceFAZolin (ANCEF) 3,000 mg in dextrose 5 % 100 mL IVPB  3,000 mg IntraVENous On Call to [address]        sodium chloride flush 0.9 % injection 5-40 mL  5-40 mL IntraVENous 2 times per day Neli Myles PA-C        sodium chloride flush 0.9 % injection 5-40 mL  5-40 mL IntraVENous PRN Ngozi Porras PA-C           Allergies:  No Known Allergies    Problem List:    Patient Active Problem List   Diagnosis Code    Lumbosacral spondylosis without myelopathy M47.817    DDD (degenerative disc disease), lumbar M51.36    C7 radiculopathy M54.12    Mood disorder ([facility]) F39    Multiple joint pain M25.50    Neck pain on left side M54.2    Pain and swelling of left elbow M25.522, M25.422    Pain in left forearm M79.632    Type 2 diabetes mellitus without complication ([facility]) K18.4    Sacroiliac dysfunction M53.3    Lumbar radicular pain M54.16    Left leg pain M79.605    Left foot drop M21.372       Past Medical History:        Diagnosis Date    Acid reflux     ADHD     Anxiety     DM (diabetes mellitus) ([facility])     Left foot drop 2/3/2022    Left leg pain 2/3/2022    Left lumbar radiculitis 2/3/2022    Low back pain     Protein in urine     Dr Zamora       Past Surgical History:        Procedure Laterality Date    ENDOSCOPY, COLON, DIAGNOSTIC      NERVE BLOCK Left 6/8/2021    LEFT LUMBAR MEDIAL BRANCH NERVE BLOCK UNDER FLUOROSCOPIC GUIDANCE AT L2, L3, L4 AND L5 DORSAL RAMI WITH SEDATION (CPT 66363) performed by Annia Valentine MD at [facility] Left 8/19/2021    LEFT S1, S2, S3 & L5 DORSAL RAMUS  BLOCK UNDER FLUOROSCOPY performed by [facility]ia Valnetine MD at [facility] OR    PAIN MANAGEMENT PROCEDURE Left 7/1/2021    LUMBAR EPIDURAL STEROID INJECTION UNDER FLUOROSCOPIC GUIDANCE AT L4-L5 LEFT PARAMEDIAN LEFT SACROILIAC JOINT INJECTION performed by Chun Ortez MD at [facility] [address] Left 12/13/2021    RADIOFREQUENCY ABLATION OF LEFT LUMBAR L4, L5, S1 UNDER FLUOROSCOPY performed by Chun Ortez MD at [address] [place] [address] Right     UPPER GASTROINTESTINAL ENDOSCOPY  10/2020       Social History:    Social History     Tobacco Use    Smoking status: Never Smoker    Smokeless tobacco: Never Used   Substance Use Topics    Alcohol use: Yes     Comment: Socially                                Counseling given: Not Answered      Vital Signs (Current): There were no vitals filed for this visit.                                            BP Readings from Last 3 Encounters:   05/27/22 (!) 148/86   05/24/22 139/80   03/02/22 128/68       NPO Status:  Date of last solid food consumption: 05/26/2022    Time of last solid food consumption: 1700    Date of last liquid consumption: 05/26/2022    Time of last liquid consumption: 2200                                                                               BMI:   Wt Readings from Last 3 Encounters:   05/27/22 (!) 302 lb (137 kg)   05/24/22 (!) 301 lb 6.4 oz (136.7 kg)   03/02/22 285 lb (129.3 kg)     There is no height or weight on file to calculate BMI.    CBC:   Lab Results   Component Value Date    WBC 11.1 05/24/2022    RBC 5.54 05/24/2022    HGB 16.8 05/24/2022    HCT 49.2 05/24/2022    MCV 88.8 05/24/2022    RDW 13.2 05/24/2022     05/24/2022       CMP:   Lab Results   Component Value Date     05/24/2022    K 4.2 05/24/2022     05/24/2022    CO2 24 05/24/2022    BUN 13 05/24/2022    CREATININE 1.2 05/24/2022    GFRAA >60 05/24/2022    LABGLOM >60 05/24/2022    LABGLOM >60 06/05/2018    GLUCOSE 160 05/24/2022    GLUCOSE 113 06/05/2018    PROT 8.5 08/23/2021    CALCIUM 9.4 05/24/2022    BILITOT 0.6 08/23/2021    ALKPHOS 74 08/23/2021    AST 20 08/23/2021    ALT 30 08/23/2021       POC Tests: No results for input(s): POCGLU, POCNA, POCK, POCCL, POCBUN, POCHEMO, POCHCT in the last 72 hours. Coags:   Lab Results   Component Value Date    PROTIME 10.9 05/24/2022    INR 1.0 05/24/2022       HCG (If Applicable): No results found for: PREGTESTUR, PREGSERUM, HCG, HCGQUANT     ABGs: No results found for: PHART, PO2ART, AHJ9JDP, VAZ8RQO, BEART, A4OFWDIG     Type & Screen (If Applicable):  No results found for: LABABO, LABRH    Drug/Infectious Status (If Applicable):  No results found for: HIV, HEPCAB    COVID-19 Screening (If Applicable): No results found for: COVID19        Anesthesia Evaluation  Patient summary reviewed no history of anesthetic complications:   Airway: Mallampati: II  TM distance: >3 FB   Neck ROM: full  Mouth opening: > = 3 FB   Dental:          Pulmonary:Negative Pulmonary ROS breath sounds clear to auscultation      (-) pneumonia, COPD, asthma, shortness of breath, sleep apnea and not a current smoker                          ROS comment: CXR 05/24/2022  FINDINGS:  The lungs are without acute focal process.  There is no effusion or  pneumothorax. The cardiomediastinal silhouette is without acute process. The  osseous structures are without acute process. Cardiovascular:  Exercise tolerance: good (>4 METS),   (+) hypertension:,     (-) valvular problems/murmurs, past MI, CAD, CABG/stent, dysrhythmias,  angina,  CHF, orthopnea, PND, no pulmonary hypertension and no hyperlipidemia    ECG reviewed  Rhythm: regular  Rate: normal  Echocardiogram reviewed         Beta Blocker:  Not on Beta Blocker      ROS comment: EKG 05/24/2022  Normal sinus rhythm with sinus arrhythmia  Left axis deviation  Minimal voltage criteria for LVH, may be normal variant  Borderline ECG  No previous ECGs available  Confirmed by Ortiz Linares ([id number]) on 5/24/2022 1:39:28 PM    Specimen Collected: 05/24/22 07:30      2D Echocardiogram 12/17/2018  <Conclusion>        No prior echo.        LVEF is 65-70%.  The left        ventricular diastolic function is normal.        Moderate concentric left ventricular hypertrophy.       There is normal LV segmental wall motion.        The left ventricular systolic function is normal.        The left ventricular diastolic function is normal.                        PAGE 3               Signed Report                     (CONTINUED)                                             Name: BARAK FAULKNER                                             Phys: ANIL BROOKS DO                                             : 1970 Age: 57      Sex: M                                             Acct: [de-identified] Loc: 401 1                                               Exam Date: 2018 Status: ADM IN                                             Radiology No: [id number]                                             Unit No: [id number]       Neuro/Psych:   (+) neuromuscular disease (Hx DDD, Lumbosacral spondylosis, C7 radiculopathy):, psychiatric history (ADHD):depression/anxiety    (-) seizures            ROS comment: MRI Lumbar Spine 2022  Impression  Lumbar spine:     At L4-L5, left foraminal disc protrusion impinges on the exiting left L4  nerve root and results in severe left neural foraminal narrowing.  Moderate  canal stenosis     Other mild degenerative changes of lumbar spine as described.     Thoracic spine MRI:     At T9-T10, moderate bilateral neural foraminal narrowing.     At T6-T7, mild canal stenosis.     At T3-T4, central disc protrusion.     RECOMMENDATIONS:  Unavailable    Denies weakness in legs, arms GI/Hepatic/Renal:   (+) GERD (Protonix this AM): well controlled, renal disease: CRI, morbid obesity     (-) PUD, hepatitis, liver disease (proteinuria) and bowel prep       Endo/Other:    (+) Diabetes (15 units of lantus on evening of 2022)Type II DM, using insulin, no malignancy/cancer.     (-) hypothyroidism, hyperthyroidism, blood dyscrasia, arthritis, no electrolyte abnormalities, no malignancy/cancer Abdominal:   (+) obese,           Vascular: negative vascular ROS. Other Findings: 5/5 strength, range of motion BUE's, 5/5 strength RLE, 4/5 strength LLE- unable to sustain leg lift. Anesthesia Plan      general     ASA 3       Induction: intravenous. MIPS: Postoperative opioids intended, Prophylactic antiemetics administered and Postoperative trial extubation. Anesthetic plan and risks discussed with patient. Use of blood products discussed with patient whom consented to blood products. Plan discussed with CRNA and attending. ADIA Espino   5/27/2022      Pt seen, examined, chart reviewed, plan discussed.   Terrell Kaye MD  5/27/2022  2:54 PM

## 2022-05-27 NOTE — BRIEF OP NOTE
Brief Postoperative Note      Patient: Skip Kim  YOB: 1970  MRN: [id number]    Date of Procedure: 5/27/2022    Pre-Op Diagnosis: LEFT L4-L5 HERNIATED DISC    Post-Op Diagnosis: Same       Procedure(s):  LEFT L4-L5 HEMILAMINECTOMY AND DISCECTOMY    Surgeon(s):  Guillermo Rosales MD    Assistant:  Physician Assistant: Burak Jha PA-C    Anesthesia: General    Estimated Blood Loss (mL): less than 50     Complications: None    Specimens:   ID Type Source Tests Collected by Time Destination   A : LUMBAR DISC Tissue Spine SURGICAL PATHOLOGY Guillermo Rosales MD 5/27/2022 1609        Implants:  * No implants in log *      Drains: * No LDAs found *    Findings: see dictated op note    Electronically signed by Narendra Torres MD on 5/27/2022 at 4:22 PM

## 2022-05-28 VITALS
SYSTOLIC BLOOD PRESSURE: 118 MMHG | RESPIRATION RATE: 16 BRPM | HEART RATE: 103 BPM | OXYGEN SATURATION: 95 % | HEIGHT: 73 IN | TEMPERATURE: 98.8 F | WEIGHT: 302 LBS | BODY MASS INDEX: 40.02 KG/M2 | DIASTOLIC BLOOD PRESSURE: 64 MMHG

## 2022-05-28 LAB
ANION GAP SERPL CALCULATED.3IONS-SCNC: 18 MMOL/L (ref 7–16)
BUN BLDV-MCNC: 11 MG/DL (ref 6–20)
CALCIUM SERPL-MCNC: 8.7 MG/DL (ref 8.6–10.2)
CHLORIDE BLD-SCNC: 103 MMOL/L (ref 98–107)
CO2: 18 MMOL/L (ref 22–29)
CREAT SERPL-MCNC: 1.2 MG/DL (ref 0.7–1.2)
GFR AFRICAN AMERICAN: >60
GFR NON-AFRICAN AMERICAN: >60 ML/MIN/1.73
GLUCOSE BLD-MCNC: 206 MG/DL (ref 74–99)
HCT VFR BLD CALC: 45.4 % (ref 37–54)
HEMOGLOBIN: 15.5 G/DL (ref 12.5–16.5)
MCH RBC QN AUTO: 30.9 PG (ref 26–35)
MCHC RBC AUTO-ENTMCNC: 34.1 % (ref 32–34.5)
MCV RBC AUTO: 90.6 FL (ref 80–99.9)
METER GLUCOSE: 177 MG/DL (ref 74–99)
METER GLUCOSE: 198 MG/DL (ref 74–99)
PDW BLD-RTO: 13.1 FL (ref 11.5–15)
PLATELET # BLD: 319 E9/L (ref 130–450)
PMV BLD AUTO: 9.6 FL (ref 7–12)
POTASSIUM SERPL-SCNC: 5 MMOL/L (ref 3.5–5)
RBC # BLD: 5.01 E12/L (ref 3.8–5.8)
SODIUM BLD-SCNC: 139 MMOL/L (ref 132–146)
WBC # BLD: 22.9 E9/L (ref 4.5–11.5)

## 2022-05-28 PROCEDURE — 97165 OT EVAL LOW COMPLEX 30 MIN: CPT

## 2022-05-28 PROCEDURE — 6370000000 HC RX 637 (ALT 250 FOR IP): Performed by: NEUROLOGICAL SURGERY

## 2022-05-28 PROCEDURE — 6370000000 HC RX 637 (ALT 250 FOR IP): Performed by: STUDENT IN AN ORGANIZED HEALTH CARE EDUCATION/TRAINING PROGRAM

## 2022-05-28 PROCEDURE — 85027 COMPLETE CBC AUTOMATED: CPT

## 2022-05-28 PROCEDURE — 2580000003 HC RX 258: Performed by: NEUROLOGICAL SURGERY

## 2022-05-28 PROCEDURE — 97535 SELF CARE MNGMENT TRAINING: CPT

## 2022-05-28 PROCEDURE — 80048 BASIC METABOLIC PNL TOTAL CA: CPT

## 2022-05-28 PROCEDURE — 97530 THERAPEUTIC ACTIVITIES: CPT

## 2022-05-28 PROCEDURE — S5553 INSULIN LONG ACTING 5 U: HCPCS | Performed by: STUDENT IN AN ORGANIZED HEALTH CARE EDUCATION/TRAINING PROGRAM

## 2022-05-28 PROCEDURE — 36415 COLL VENOUS BLD VENIPUNCTURE: CPT

## 2022-05-28 PROCEDURE — G0378 HOSPITAL OBSERVATION PER HR: HCPCS

## 2022-05-28 PROCEDURE — 82962 GLUCOSE BLOOD TEST: CPT

## 2022-05-28 PROCEDURE — 97161 PT EVAL LOW COMPLEX 20 MIN: CPT

## 2022-05-28 PROCEDURE — 6360000002 HC RX W HCPCS: Performed by: NEUROLOGICAL SURGERY

## 2022-05-28 RX ORDER — INSULIN GLARGINE-YFGN 100 [IU]/ML
32 INJECTION, SOLUTION SUBCUTANEOUS NIGHTLY
Status: DISCONTINUED | OUTPATIENT
Start: 2022-05-28 | End: 2022-05-28 | Stop reason: HOSPADM

## 2022-05-28 RX ORDER — INSULIN GLARGINE-YFGN 100 [IU]/ML
15 INJECTION, SOLUTION SUBCUTANEOUS NIGHTLY
Status: DISCONTINUED | OUTPATIENT
Start: 2022-05-28 | End: 2022-05-28

## 2022-05-28 RX ORDER — OXYCODONE HYDROCHLORIDE 5 MG/1
5 TABLET ORAL EVERY 4 HOURS PRN
Qty: 42 TABLET | Refills: 0 | Status: SHIPPED | OUTPATIENT
Start: 2022-05-28 | End: 2022-06-06 | Stop reason: SDUPTHER

## 2022-05-28 RX ORDER — INSULIN LISPRO 100 [IU]/ML
0-6 INJECTION, SOLUTION INTRAVENOUS; SUBCUTANEOUS NIGHTLY
Status: DISCONTINUED | OUTPATIENT
Start: 2022-05-28 | End: 2022-05-28 | Stop reason: HOSPADM

## 2022-05-28 RX ORDER — INSULIN LISPRO 100 [IU]/ML
0-12 INJECTION, SOLUTION INTRAVENOUS; SUBCUTANEOUS
Status: DISCONTINUED | OUTPATIENT
Start: 2022-05-28 | End: 2022-05-28 | Stop reason: HOSPADM

## 2022-05-28 RX ADMIN — OXYCODONE HYDROCHLORIDE 10 MG: 10 TABLET ORAL at 11:22

## 2022-05-28 RX ADMIN — GLIPIZIDE 10 MG: 5 TABLET ORAL at 08:35

## 2022-05-28 RX ADMIN — POLYETHYLENE GLYCOL 3350 17 G: 17 POWDER, FOR SOLUTION ORAL at 08:35

## 2022-05-28 RX ADMIN — BISACODYL 5 MG: 5 TABLET, COATED ORAL at 08:39

## 2022-05-28 RX ADMIN — OXYCODONE HYDROCHLORIDE 10 MG: 10 TABLET ORAL at 01:39

## 2022-05-28 RX ADMIN — OXYCODONE HYDROCHLORIDE 10 MG: 10 TABLET ORAL at 06:27

## 2022-05-28 RX ADMIN — CEFAZOLIN SODIUM 3000 MG: 10 INJECTION, POWDER, FOR SOLUTION INTRAVENOUS at 00:21

## 2022-05-28 RX ADMIN — PREGABALIN 50 MG: 50 CAPSULE ORAL at 13:18

## 2022-05-28 RX ADMIN — PIOGLITAZONE 15 MG: 15 TABLET ORAL at 08:35

## 2022-05-28 RX ADMIN — INSULIN GLARGINE-YFGN 15 UNITS: 100 INJECTION, SOLUTION SUBCUTANEOUS at 01:39

## 2022-05-28 RX ADMIN — MORPHINE SULFATE 4 MG: 4 INJECTION, SOLUTION INTRAMUSCULAR; INTRAVENOUS at 08:40

## 2022-05-28 RX ADMIN — ACETAMINOPHEN 650 MG: 325 TABLET ORAL at 13:18

## 2022-05-28 RX ADMIN — ACETAMINOPHEN 650 MG: 325 TABLET ORAL at 07:22

## 2022-05-28 RX ADMIN — ACETAMINOPHEN 650 MG: 325 TABLET ORAL at 00:20

## 2022-05-28 RX ADMIN — SENNOSIDES AND DOCUSATE SODIUM 1 TABLET: 50; 8.6 TABLET ORAL at 08:35

## 2022-05-28 RX ADMIN — PANTOPRAZOLE SODIUM 40 MG: 40 TABLET, DELAYED RELEASE ORAL at 08:35

## 2022-05-28 RX ADMIN — CYCLOBENZAPRINE 10 MG: 10 TABLET, FILM COATED ORAL at 14:56

## 2022-05-28 RX ADMIN — MORPHINE SULFATE 4 MG: 4 INJECTION, SOLUTION INTRAMUSCULAR; INTRAVENOUS at 00:20

## 2022-05-28 RX ADMIN — SODIUM CHLORIDE, PRESERVATIVE FREE 10 ML: 5 INJECTION INTRAVENOUS at 08:36

## 2022-05-28 RX ADMIN — MORPHINE SULFATE 4 MG: 4 INJECTION, SOLUTION INTRAMUSCULAR; INTRAVENOUS at 04:30

## 2022-05-28 RX ADMIN — PREGABALIN 50 MG: 50 CAPSULE ORAL at 08:40

## 2022-05-28 RX ADMIN — INSULIN LISPRO 2 UNITS: 100 INJECTION, SOLUTION INTRAVENOUS; SUBCUTANEOUS at 08:49

## 2022-05-28 RX ADMIN — CEFAZOLIN SODIUM 3000 MG: 10 INJECTION, POWDER, FOR SOLUTION INTRAVENOUS at 08:38

## 2022-05-28 RX ADMIN — OXYCODONE HYDROCHLORIDE 10 MG: 10 TABLET ORAL at 14:56

## 2022-05-28 RX ADMIN — INSULIN LISPRO 2 UNITS: 100 INJECTION, SOLUTION INTRAVENOUS; SUBCUTANEOUS at 11:43

## 2022-05-28 RX ADMIN — DOCUSATE SODIUM 200 MG: 100 CAPSULE, LIQUID FILLED ORAL at 08:36

## 2022-05-28 RX ADMIN — CYCLOBENZAPRINE 10 MG: 10 TABLET, FILM COATED ORAL at 08:35

## 2022-05-28 ASSESSMENT — PAIN SCALES - GENERAL
PAINLEVEL_OUTOF10: 8
PAINLEVEL_OUTOF10: 0
PAINLEVEL_OUTOF10: 8
PAINLEVEL_OUTOF10: 8
PAINLEVEL_OUTOF10: 7
PAINLEVEL_OUTOF10: 7
PAINLEVEL_OUTOF10: 8
PAINLEVEL_OUTOF10: 7
PAINLEVEL_OUTOF10: 7
PAINLEVEL_OUTOF10: 2
PAINLEVEL_OUTOF10: 8
PAINLEVEL_OUTOF10: 8

## 2022-05-28 ASSESSMENT — PAIN DESCRIPTION - FREQUENCY
FREQUENCY: CONTINUOUS

## 2022-05-28 ASSESSMENT — PAIN - FUNCTIONAL ASSESSMENT
PAIN_FUNCTIONAL_ASSESSMENT: PREVENTS OR INTERFERES SOME ACTIVE ACTIVITIES AND ADLS

## 2022-05-28 ASSESSMENT — PAIN DESCRIPTION - PAIN TYPE
TYPE: SURGICAL PAIN

## 2022-05-28 ASSESSMENT — PAIN DESCRIPTION - ORIENTATION
ORIENTATION: LOWER;POSTERIOR
ORIENTATION: MID;LOWER
ORIENTATION: LOWER;MID

## 2022-05-28 ASSESSMENT — PAIN DESCRIPTION - ONSET
ONSET: ON-GOING

## 2022-05-28 ASSESSMENT — PAIN DESCRIPTION - LOCATION
LOCATION: BACK
LOCATION: ABDOMEN

## 2022-05-28 ASSESSMENT — PAIN DESCRIPTION - DESCRIPTORS
DESCRIPTORS: ACHING;BURNING;SHARP
DESCRIPTORS: ACHING;BURNING;SORE
DESCRIPTORS: PRESSURE;ACHING

## 2022-05-28 ASSESSMENT — PAIN SCALES - WONG BAKER: WONGBAKER_NUMERICALRESPONSE: 0

## 2022-05-28 NOTE — PROGRESS NOTES
We are having difficulty getting walker and BSC d/t his insurance. 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 Pt states he is fine to have wife purchase equipment and he wants to discharge as soon as possible.

## 2022-05-28 NOTE — PROGRESS NOTES
Neurosurgery Attending    Patient was brought into the hospital under observation status because of increased post-operative pain and the need for pain control with IV pain medications    Hemant Marcelo MD

## 2022-05-28 NOTE — PROGRESS NOTES
Discharge instructions given and explained to pt who voiced understanding, denies needs or questions, has arrangements made to  a bariatric walker and bedside commode.

## 2022-05-28 NOTE — PROGRESS NOTES
Department of Neurosurgery  Attending Progress Note    CHIEF COMPLAINT:    SUBJECTIVE:  C/o incisional pain    ROS:    OBJECTIVE  Physical  VITALS:  /87   Pulse 90   Temp 97.9 °F (36.6 °C) (Temporal)   Resp 16   Ht 6' 1\" (1.854 m)   Wt (!) 302 lb (137 kg)   SpO2 96%   BMI 39.84 kg/m²   NEUROLOGIC:  Mental Status Exam:  Level of Alertness:   awake  Orientation:   person, place, time  Motor Exam:  Motor exam is symmetrical 5 out of 5 all extremities bilaterally  Sensory:  Sensory intact    Data  CBC:   Lab Results   Component Value Date    WBC 22.9 05/28/2022    RBC 5.01 05/28/2022    HGB 15.5 05/28/2022    HCT 45.4 05/28/2022    MCV 90.6 05/28/2022    MCH 30.9 05/28/2022    MCHC 34.1 05/28/2022    RDW 13.1 05/28/2022     05/28/2022    MPV 9.6 05/28/2022     BMP:    Lab Results   Component Value Date     05/28/2022    K 5.0 05/28/2022    K 4.2 05/24/2022     05/28/2022    CO2 18 05/28/2022    BUN 11 05/28/2022    LABALBU 4.4 08/23/2021    LABALBU 4.3 06/05/2018    CREATININE 1.2 05/28/2022    CALCIUM 8.7 05/28/2022    GFRAA >60 05/28/2022    LABGLOM >60 05/28/2022    LABGLOM >60 06/05/2018    GLUCOSE 206 05/28/2022    GLUCOSE 113 06/05/2018     Current Inpatient Medications  Current Facility-Administered Medications: insulin glargine-yfgn (SEMGLEE-YFGN) injection vial 15 Units, 15 Units, SubCUTAneous, Nightly  ALPRAZolam (XANAX) tablet 0.5 mg, 0.5 mg, Oral, TID PRN  cyclobenzaprine (FLEXERIL) tablet 10 mg, 10 mg, Oral, TID PRN  glipiZIDE (GLUCOTROL) tablet 10 mg, 10 mg, Oral, BID WC  methylphenidate CR tablet 27 mg, 27 mg, Oral, Daily  pantoprazole (PROTONIX) tablet 40 mg, 40 mg, Oral, Daily  pioglitazone (ACTOS) tablet 15 mg, 15 mg, Oral, Daily  pregabalin (LYRICA) capsule 50 mg, 50 mg, Oral, TID  sodium chloride flush 0.9 % injection 5-40 mL, 5-40 mL, IntraVENous, 2 times per day  sodium chloride flush 0.9 % injection 5-40 mL, 5-40 mL, IntraVENous, PRN  0.9 % sodium chloride infusion, , IntraVENous, PRN  acetaminophen (TYLENOL) tablet 650 mg, 650 mg, Oral, Q6H  ondansetron (ZOFRAN-ODT) disintegrating tablet 4 mg, 4 mg, Oral, Q8H PRN **OR** ondansetron (ZOFRAN) injection 4 mg, 4 mg, IntraVENous, Q6H PRN  0.9 % sodium chloride infusion, , IntraVENous, Continuous  ceFAZolin (ANCEF) 3,000 mg in dextrose 5 % 100 mL IVPB, 3,000 mg, IntraVENous, Q8H  oxyCODONE (ROXICODONE) immediate release tablet 5 mg, 5 mg, Oral, Q4H PRN **OR** oxyCODONE HCl (OXY-IR) immediate release tablet 10 mg, 10 mg, Oral, Q4H PRN  morphine (PF) injection 2 mg, 2 mg, IntraVENous, Q2H PRN **OR** morphine sulfate (PF) injection 4 mg, 4 mg, IntraVENous, Q2H PRN  polyethylene glycol (GLYCOLAX) packet 17 g, 17 g, Oral, Daily  bisacodyl (DULCOLAX) EC tablet 5 mg, 5 mg, Oral, Daily  sennosides-docusate sodium (SENOKOT-S) 8.6-50 MG tablet 1 tablet, 1 tablet, Oral, BID  bisacodyl (DULCOLAX) suppository 10 mg, 10 mg, Rectal, Daily PRN  fleet rectal enema 1 enema, 1 enema, Rectal, Daily PRN  benzocaine-menthol (CEPACOL SORE THROAT) lozenge 1 lozenge, 1 lozenge, Oral, Q2H PRN  docusate sodium (COLACE) capsule 200 mg, 200 mg, Oral, BID  glucose chewable tablet 16 g, 4 tablet, Oral, PRN  dextrose bolus 10% 125 mL, 125 mL, IntraVENous, PRN **OR** dextrose bolus 10% 250 mL, 250 mL, IntraVENous, PRN  glucagon (rDNA) injection 1 mg, 1 mg, IntraMUSCular, PRN  dextrose 5 % solution, 100 mL/hr, IntraVENous, PRN    ASSESSMENT AND PLAN:    POD 1 s/p lumbar laminectomy. Stable.  PT/OT and D/C planning    Peng Leslie MD

## 2022-05-28 NOTE — PROGRESS NOTES
Physical Therapy    Initial Assessment     Name: Bev Goodrich  : 1970  MRN: [id number]      Date of Service: 2022    Evaluating PT: Homa Hunter, PT, DPT [id number]      Room #:  1296/5993-G  Diagnosis:  Lumbar herniated disc [M51.26]  PMHx/PSHx:  DM, low back pain, ADHD, anxiety  Procedure/Surgery:  Left L4-L5 hemilaminectomy and discectomy ()  Precautions:  Fall risk, spinal neutrality  Equipment Needs:  [employer]    SUBJECTIVE:    Pt lives with wife and 24 y/o daughter in a 2 story house with 1 stair(s) and no rail(s) to enter. Full flight of stairs and 2 rails to second floor bed and bath. 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 Pt ambulated without AD prior to admission. OBJECTIVE:   Initial Evaluation  Date: 22 Treatment Date: Short Term/ Long Term   Goals   AM-PAC 6 Clicks      Was pt agreeable to Eval/treatment? Yes     Does pt have pain? No current complaints of pain     Bed Mobility  Rolling: NT  Supine to sit: SBA (HOB elevated)  Sit to supine: NT  Scooting: SBA to EOB  Rolling: Independent   Supine to sit: Independent   Sit to supine: Independent   Scooting: Independent    Transfers Sit to stand: SBA  Stand to sit: SBA  Stand pivot: SBA with [employer]  Sit to stand: Independent   Stand to sit: Independent   Stand pivot: Mod Independent with [employer]   Ambulation   50, 75 feet with [employer] with SBA  200 feet with [employer] Mod Independent    Stair negotiation: ascended and descended 10 steps with 1 rail with SBA  >12 step(s) with 1 rail(s) Mod Independent    ROM BUE: Refer to OT note  BLE: WFL     Strength BUE: Refer to OT note  BLE: 4/5 grossly     Balance Sitting EOB: Independent   Dynamic Standing: SBA with [employer]  Dynamic Standing: Mod Independent with [employer]     Pt is A & O x: 4 to person, place, month/year, and situation. Sensation: Pt denies numbness and tingling of extremities. Edema: Unremarkable. Patient education  Pt educated on spinal neutrality.     Patient response to education:   Pt verbalized understanding Pt demonstrated skill Pt requires further education in this area   Yes Yes Reinforce     ASSESSMENT:    Conditions Requiring Skilled Therapeutic Intervention:    [x]Decreased strength     []Decreased ROM  [x]Decreased functional mobility  [x]Decreased balance   []Decreased endurance   []Decreased posture  []Decreased sensation  []Decreased coordination   []Decreased vision  []Decreased safety awareness   []Increased pain     Comments:    Pt was in bed upon room entry; agreeable to PT evaluation. Pt completed supine to sit transfer with HOB elevated. Pt reports plan is to sleep in recliner once discharged home. Pt ambulated several times with [employer]. Gait was slow but steady. Pt reported mild dizziness with ambulation but symptoms were tolerable. Pt negotiated steps with nonreciprocal step pattern; pt was safe and had good controlled descent. Pt ambulated back to room and into bathroom then back to chair. All questions and concerns were addressed. Pt was left in chair with all needs met at conclusion of session. Treatment:  Patient practiced and was instructed in the following treatment:     Therapeutic activities:  o Bed mobility: Pt was cued for technique during bed mobility transfers. o Transfers: Pt was cued for hand placement during sit <> stand transfers. o Ambulation: Pt ambulated several times with [employer]. Pt was cued for safety and spinal neutrality. o Education: Pt was educated on spinal neutrality. Pt's/family goals:  1. To return home. Prognosis is Good for reaching above PT goals. Patient and or family understand(s) diagnosis, prognosis, and plan of care. Yes.     PHYSICAL THERAPY PLAN OF CARE:    PT POC is established based on physician order and patient diagnosis     Referring provider/PT Order:    Start   Ordering Provider    05/27/22 1900  PT eval and treat  Start:  05/27/22 1900,   End:  05/27/22 1900,   ONE TIME,   Standing Count:  1 JANES Swenson MD        Diagnosis:  Lumbar herniated disc [M51.26]  Specific instructions for next treatment:  Increase activity. Current Treatment Recommendations:     [x] Strengthening to improve independence with functional mobility   [x] ROM to improve independence with functional mobility   [x] Balance Training to improve static/dynamic balance and to reduce fall risk  [] Endurance Training to improve activity tolerance during functional mobility   [] Transfer Training to improve safety and independence with all functional transfers   [x] Gait Training to improve gait mechanics, endurance and assess need for appropriate assistive device  [x] Stair Training in preparation for safe discharge home and/or into the community   [] Positioning to prevent skin breakdown and contractures  [x] Safety and Education Training   [x] Patient/Caregiver Education   [] HEP  [] Other     PT long term treatment goals are located in above grid    Frequency of treatments: Daily x 1-2 days. Time in  0810  Time out  0900    Total Treatment Time  20 minutes     Evaluation Time includes thorough review of current medical information, gathering information on past medical history/social history and prior level of function, completion of standardized testing/informal observation of tasks, assessment of data and education on plan of care and goals.     CPT codes:  [x] Low Complexity PT evaluation 73708  [] Moderate Complexity PT evaluation 47705  [] High Complexity PT evaluation 53210  [] PT Re-evaluation 70897  [] Gait training 21842 0 minutes  [] Manual therapy 22151 0 minutes  [x] Therapeutic activities 71874 20 minutes  [] Therapeutic exercises 91925 0 minutes  [] Neuromuscular reeducation 57241 0 minutes     Timur Lao, PT, DPT  [id number]

## 2022-05-28 NOTE — PROGRESS NOTES
Hospitalist Progress Note      Chart reviewed. Our service was consulted this morning and seen by my colleague for medical management. Insulin was adjusted given poor intake. Discharge order noted. Continue current care until discharge and then resume home regimen. Discharge medications reviewed and updated as appropriate. Non-billable rounding. Thanks for allowing us to participate in the care of Claudia Lane. We will sign off.  Please do not hesitate to contact us if needed.  +++++++++++++++++++++++++++++++++++++++++++++++++  Alyssa Payment, [address]

## 2022-05-28 NOTE — PLAN OF CARE
Problem: Chronic Conditions and Co-morbidities  Goal: Patient's chronic conditions and co-morbidity symptoms are monitored and maintained or improved  Outcome: Progressing     Problem: Pain  Goal: Verbalizes/displays adequate comfort level or baseline comfort level  Outcome: Completed

## 2022-05-28 NOTE — PLAN OF CARE
Problem: Discharge Planning  Goal: Discharge to home or other facility with appropriate resources  5/28/2022 1424 by Yulisa Britton RN  Outcome: Completed  5/28/2022 1002 by Yulisa Britton RN  Outcome: Progressing  Flowsheets (Taken 5/28/2022 0728 by Aracelis Feliz RN)  Discharge to home or other facility with appropriate resources: Identify barriers to discharge with patient and caregiver     Problem: Chronic Conditions and Co-morbidities  Goal: Patient's chronic conditions and co-morbidity symptoms are monitored and maintained or improved  5/28/2022 1424 by Yulisa Britton RN  Outcome: Completed  5/28/2022 1002 by Yulisa Britton RN  Outcome: Progressing     Problem: Safety - Adult  Goal: Free from fall injury  5/28/2022 1424 by Yulisa Britton RN  Outcome: Completed  5/28/2022 1002 by Yulisa Britton RN  Outcome: Progressing     Problem: ABCDS Injury Assessment  Goal: Absence of physical injury  Outcome: Completed

## 2022-05-28 NOTE — PROGRESS NOTES
[address], [address] [place] [address], [address]                                                  Patient Name: Juhi Pulido  MRN: [id number]  : 1970  Room: [address] [place] [address]    Evaluating OT: ZOEY Barbour, OTR/L  # [id number]    Referring Provider:  Yue Moore MD  Specific Provider Orders:  Stacia Tang and Treat\"22    Diagnosis: Lumbar herniated disc [M51.26]    Pt was admitted for Spinal surgery r/t herniated disc    Pertinent Medical History:  Pt has a past medical history of Acid reflux, ADHD, Anxiety, DM (diabetes mellitus) ([facility] [address] Left foot drop, Left leg pain, Left lumbar radiculitis, Low back pain, and Protein in urine. ,  has a past surgical history that includes Patella surgery (Right); Upper gastrointestinal endoscopy (10/2020); Endoscopy, colon, diagnostic; Nerve Block (Left, 2021); Pain management procedure (Left, 2021); Nerve Block (Left, 2021); and Pain management procedure (Left, 2021). Surgeries this admission: 1. Left-sided L4-L5 hemilaminotomy, left-sided L4-L5 medial  facetectomy, left-sided L4-L5 foraminotomy and left-sided L4-L5 diskectomy.     Precautions:  Fall Risk  Spinal Precautions    Assessment of current deficits   [x] Functional mobility   [x]ADLs  [x] Strength               []Cognition   [x] Functional transfers   [x] IADLs         [x] Safety Awareness   [x]Endurance   [] Fine Coordination              [x] Balance      [] Vision/perception   []Sensation    []Gross Motor Coordination  [] ROM  [] Delirium                   [] Motor Control       OT PLAN OF CARE   OT POC based on physician orders, patient diagnosis and results of clinical assessment    Frequency/Duration 1-3 days/wk for 2 weeks PRN   Specific OT Treatment to include:     * Instruction/training on adapted ADL techniques and AE recommendations to increase functional independence within precautions       * Training on energy conservation strategies, correct breathing pattern and techniques to improve independence/tolerance for self-care routine  * Functional transfer/mobility training/DME recommendations for increased independence, safety, and fall prevention  * Patient/Family education to increase follow through with safety techniques and functional independence  * Recommendation of environmental modifications for increased safety with functional transfers/mobility and ADLs  * Cognitive retraining/development of therapeutic activities to improve problem solving, judgement, memory, and attention for increased safety/participation in ADL/IADL tasks  * Therapeutic exercise to improve motor endurance, ROM, and functional strength for ADLs/functional transfers  * Therapeutic activities to facilitate/challenge dynamic balance, stand tolerance for increased safety and independence with ADLs  * Therapeutic activities to facilitate gross/fine motor skills for increased independence with ADLs  * Neuro-muscular re-education: facilitation of righting/equilibrium reactions  * Positioning to improve skin integrity, interaction with environment and functional independence  * Delirium prevention/treatment  * Manual techniques for edema management  Other:    Recommended Adaptive Equipment: TBD as pt progresses - BSC, Shower chair, ISSUED ADAPTIVE EQUIPMENT      Home Living:  Pt lives with his wife and adult Dtr in a 2-story house. Bed/bath on 2nd floor. Office in Basement. Bathroom setup:  Tub-Shower, Walk-in-Shower, Standard-height Commode   Equipment owned:  None    Available Family Assist:  Family can assist PRN    Prior Level of Function:  Pt reported being IND with all ADLs, IADLs, Transfers and Mobility using no AD for ambulation.    Driving:  Yes  Occupation:  Works from [employer]    Pain Level:  3/10 LBP - mild Relief w/ Rest and Repositioning, Nsg Notified   Additional Complaints:  None    Cognition: A & O x 4   Able to Follow Multi-Step Commands INDly   Memory:  good    Sequencing:  good    Problem solving:  good    Judgement/safety:  good   Additional Comments:  Pt was pleasant and cooperative. Vitals/Lab Values:  WFL Room air       Functional Assessment:  AM-PAC Daily Activity Raw Score: 15/24     Initial Eval Status  Date: 5/28/22   Treatment Status  Date: STGs = LTGs  Time frame: 10-14 days   Feeding IND after set up      NA   Grooming SUP/Set up    Standing at the sink - Pt ed for techs to adhere to spinal prec w/ functional reaching for ax    Mod I  Standing at the Sink   UB Dressing SUP/Set up    Seated EOB  Unable to tolerate item retrieval for activities    Mod I     LB Dressing Max A    Footwear/pants seated/standing  Pt ed for safe/adaptive techs, use of adaptive equip - issued Adaptive equipment - Min A for task w/ use of AE    Mod I     Bathing NT    Pt ed re: Benefits of use of Shower chair/Tub Bench, resources for obtaining equip    Mod I      Toileting SUP    Standing at the Commode  VCs for safety, adaptive techs for adherence to Spinal precautions    Mod I     Bed Mobility  Supine to sit:  Min A  Sit to supine:  NT     Pt ed/VCs for log-rolling tech    Supine to sit: IND  Sit to supine: IND     Functional Transfers SUP    EOB, Chair  Pt ed for safety/hand placement    Mod I     Functional Mobility SUP w/ [employer]    Short household distances in room, bathroom  Pt ed for safety/improved safety awareness, walker safety    Mod I     Balance Sitting:     Static:  SUP    Dynamic:  SUP w/ functional ax EOB     Standing:     Static:  SUP    Dynamic:  SUP w/ functional ax/mobility w/ [employer]    Sitting:     Static:  IND    Dynamic:  IND w/ functional ax    Standing:     Static:  Mod I w/ AD PRN    Dynamic:  Mod I w/ functional ax/mobility w/ AD PRN   Activity Tolerance Fair      Good   Visual/  Perceptual    Hearing:  WNL   Glasses: Yes    WFL   Hearing Aids:  No Hand Dominance: Right   AROM Strength Additional Info:    RUE  WFL WNL Good ;   Good FMC/dexterity noted during ADL tasks     LUE WFL WNL Good ;    Good FMC/dexterity noted during ADL tasks       Sensation:  Denies numbness or tingling Doe UEs   Tone: WFL Doe UEs   Edema: None Noted Doe UEs     Comments: Upon arrival, patient was found in semi-supine. He was agreeable to participate in therapeutic ax. No Family present during session. Received permission from RN prior to engaging pt in OT services. Educated pt on role of OT services. At the end of the session, patient was properly positioned in b/s chair. Call light and phone within reach, all lines and tubes intact. Oriented pt to call bell. Made all appropriate Environmental Modifications to facilitate pt's level of IND and safety. All needs met. Overall patient demonstrated decreased independence and safety during completion of ADL/functional transfer/mobility tasks. Pt would benefit from continued skilled OT to increase safety and independence with completion of ADL/IADL tasks for functional independence and quality of life.     Treatment: OT treatment provided this date includes:    Instruction/training on safety and adapted techniques for completion of ADLs, use of DME/AD/Adaptive equip: within spinal precautions    Instruction/training on safe functional mobility/transfer techniques, use of DME/AD: within spinal precautions    Instruction/training on energy conservation techs (EC)/Pursed-Lip Breathing (PLB)/work simplification for completion of ADLs:      Neuromuscular Reeducation to facilitate balance/righting reactions for increased function with ADLs:     Skilled positioning/alignment for Pain Mgmt, to maximize Pt's safety and ability to [facility] interact w/ his/her environment   Activity tolerance - Sitting/Standing to improve endurance w/ functional ax    Cognitive retraining -  Cues for safety/safety awareness, techs for adherence to spinal precautions    Skilled monitoring of pt's response to tx ax       Consulted RN     Made all appropriate Environmental Modifications to facilitate pt's level of IND and safety.  Recommendations for Continued Participation in OT services during Hospitalization    Pt and/or Family verbalized/demonstrated a Good understanding of education provided. Will Review PRN. Rehab Potential: Good\ for established goals     Patient / Family Goal: Return home this date      Patient and/or family were instructed on functional diagnosis, prognosis/goals and OT plan of care. Demonstrated Good understanding. Eval Complexity: Low    Time In: 1304  Time Out: 1350  Total Treatment Time: 31 minutes    Min Units   OT Eval Low 97165  X  1   OT Eval Medium 00593      OT Eval High 11379      OT Re-Eval [id number]       Therapeutic Ex 56758       Therapeutic Activities 47286       ADL/Self Care 93322  31  2   Orthotic Management 16764       Manual 03010     Neuro Re-Ed 16126       Non-Billable Time              Evaluation Time additionally includes thorough review of current medical information, gathering information on past medical history/social history and prior level of function, completion of standardized testing/informal observation of tasks, assessment of data and education on plan of care and goals.             Ifeoma Garnica, MOT, OTR/L  # [id number]

## 2022-05-28 NOTE — PLAN OF CARE
Problem: Discharge Planning  Goal: Discharge to home or other facility with appropriate resources  Outcome: Progressing  Flowsheets (Taken 5/28/2022 0728 by Miguel Angel Henderson, RN)  Discharge to home or other facility with appropriate resources: Identify barriers to discharge with patient and caregiver     Problem: Chronic Conditions and Co-morbidities  Goal: Patient's chronic conditions and co-morbidity symptoms are monitored and maintained or improved  5/28/2022 1002 by Yanna Freeman RN  Outcome: Progressing  5/27/2022 2344 by Brandon Nunn RN  Outcome: Progressing     Problem: Safety - Adult  Goal: Free from fall injury  Outcome: Progressing

## 2022-05-28 NOTE — PROGRESS NOTES
CLINICAL PHARMACY NOTE: MEDS TO BEDS    Total # of Prescriptions Filled: 1   The following medications were delivered to the patient:  · Oxycodone 5mg    Additional Documentation:    Delivered to patient 5/28/22 @1:55pm

## 2022-05-28 NOTE — CARE COORDINATION
Social Work Discharge Planning:  Discharge order noted and DME orders. SW attempted to call [facility]. [facility] [employer] all closed will not reopen until Monday for DME. [facility] (do not accept pt [employer]). SW/CM awaiting call back from [facility] and Geno Cortes  Notified CM.   Electronically signed by MALINI Ruiz on 5/28/2022 at 11:54 AM

## 2022-05-28 NOTE — CARE COORDINATION
Care Coordination  Received a call from Daylin Yang on [address] telling me the patient is going to go home and he will take care of getting his own bariatric ww and bsc. . he does not want to wait anymore.

## 2022-05-28 NOTE — OP NOTE
[address]                  [address] [facility],  [facility]                                OPERATIVE REPORT    PATIENT NAME: Prabhu Naidu                     :        1970  MED REC NO:   [id number]                            ROOM:       5207  ACCOUNT NO:   [de-identified]                           ADMIT DATE: 2022  PROVIDER:     Leo Kellogg MD    DATE OF PROCEDURE:  2022    PREOPERATIVE DIAGNOSIS:  Left L4-L5 herniated nucleus pulposus. POSTOPERATIVE DIAGNOSIS:  Left L4-L5 herniated nucleus pulposus. OPERATIONS PERFORMED:  1.  Left-sided L4-L5 hemilaminotomy, left-sided L4-L5 medial  facetectomy, left-sided L4-L5 foraminotomy and left-sided L4-L5  diskectomy. 2.  Use of intraoperative fluoroscopy interpreted by myself, the  surgeon. 3.  AS modifier for Joan Reyes PA-C, who assisted with primary  exposure, primary closure and retraction of neural elements. ANESTHESIA:  Generalized endotracheal anesthesia. SURGEON:  Leo Kellogg MD    ASSISTANT:  Joan Reyes PA-C    COMPLICATIONS:  None. ESTIMATED BLOOD LOSS:  50 mL. SPECIMEN:  Disk. OPERATIVE INDICATIONS:  The patient is a 57-year-old gentleman who  presented to the office complaining of back pain that radiated into his  left leg. He had an MRI that showed that he had herniated disk at  L4-L5. He had failed conservative therapy and after risks, benefits and  alternatives were discussed with the patient, it was determined that he  would undergo the above-listed procedure. Of note, Joan Reyes PA-C  services were required as she was the only qualified assistant to assist  with primary exposure and primary closure. OPERATIVE PROCEDURE:  The patient was brought into the operating room. A time-out was performed where he was identified by his name, medical  record number and the operative procedure which he was about to undergo.   Next, induction of generalized endotracheal anesthesia was then  commenced. Upon completion of induction of generalized endotracheal  anesthesia, he received preoperative antibiotics. He was then flipped  into prone position on a Renaldo table. All pressure points were  padded. His lumbosacral region was prepped and draped in the usual  sterile fashion. After this was done, #10 blade was used to make the  skin incision. Monopolar cautery was used to dissect through the  subcutaneous tissue. I placed a self-retaining Weitlaner retractor into  the wound. Next, I opened up the lumbodorsal fascia sharply with  monopolar cautery and I exposed the spinous processes at L4 and L5. I  then proceeded to then perform a subperiosteal dissection to expose the  bilateral lamina at L4 and L5. I used intraoperative fluoroscopy to  confirm I was at the appropriate levels. I then placed a self-retaining  Zelpi retractor into the wound. Next, I used the high-speed terrie to  thin out the lamina at L4 and L5 on the patient's left side. After this  was done, I then used #4 Kerrison punch to perform a left-sided L4 and  L5 hemilaminotomy, left-sided L4-L5 medial facetectomy and left-sided L4  and L5 foraminotomy. After this was done, I identified the thecal sac  and the nerve root. I retracted thecal sac medially and I did identify  a small fragment of disk at the L4-L5 interspace. I used #11 blade to  perform an annulotomy. I subsequently used pituitary rongeurs to remove  the disk material.  After this was done, I inspected the nerve root and  thecal sac. They appeared pulsatile with good color. There was no  evidence of nerve root compression. I then obtained adequate hemostasis  with both monopolar and bipolar cautery. I irrigated the wound  copiously with antibiotic-impregnated saline.   I then proceeded to close  the wound in layers using 0 Vicryl for the fascia, 2-0 Vicryl for the  subcutaneous layer and 4-0 Monocryl in a subcuticular fashion for the  skin. Dermabond was applied over the skin surface. A dry sterile  dressing was placed over this. The patient was then flipped into supine  position on his hospital bed, was extubated and transported to the  postanesthesia care unit in stable condition. There were no  complications. Counts were correct. I was present for the entire case. Please note, Joan Reyes PA-C was the only qualified assistant. She  assisted with primary exposure and primary closure.         Saran Quintana MD    D: 05/27/2022 20:14:28       T: 05/27/2022 20:16:43     [email or web address]  Job#: [id number]     Doc#: [id number]    CC:

## 2022-05-31 NOTE — DISCHARGE SUMMARY
Neurosurgery Surgery Discharge Summary    [address] [facility] SUMMARY:                The patient is a 54 y.o. male who was admitted to the hospital on 5/27/2022 11:15 AM for treatment of lumbar herniated disc. On the day of admission, a left L4-L5 hemilaminectomy was performed. The patient's hospital course was uncomplicated and consisted of physical therapy, incision observation, and a return to normal oral intake. The patient was discharged on 5/28/2022  3:30 PM tolerating a diet, moving bowels, and urinating without difficulty. The incisions were clean and intact. The patient was discharged home in satisfactory condition with instructions to call the office for a follow up appointment. Hospital Problem List:  Principal Problem:    Lumbar herniated disc  Resolved Problems:    * No resolved hospital problems. *     Procedure(s) (LRB):  LEFT L4-L5 HEMILAMINECTOMY AND DISCECTOMY (Left)    Discharge Medications:   Discharge Medication List as of 5/28/2022  2:39 PM      START taking these medications    Details   oxyCODONE (ROXICODONE) 5 MG immediate release tablet Take 1 tablet by mouth every 4 hours as needed for Pain for up to 7 days. , Disp-42 tablet, R-0Normal         CONTINUE these medications which have NOT CHANGED    Details   cyclobenzaprine (FLEXERIL) 5 MG tablet Take 1 tablet by mouth 2 times daily as needed for Muscle spasms Usually takes only once a day, Disp-30 tablet, R-0Normal      methylphenidate 27 MG CR tablet Take 27 mg by mouth daily. Historical Med      Continuous Blood Gluc Sensor (FREESTYLE VIDAL 2 SENSOR) MISC Inject 1 patch into the skin every 14 daysHistorical Med      pregabalin (LYRICA) 50 MG capsule Take 1 capsule by mouth 3 times daily for 90 days. , Disp-90 capsule, R-2Normal      LANTUS SOLOSTAR 100 UNIT/ML injection pen Inject 32 Units into the skin nightly , DAWHistorical Med      Lidocaine 1.8 % PTCH Apply patch 12 hours and remove for 12 hours, Disp-30 patch, [id number]      TRULICITY 1.5 LN/2.6MS SOPN 1.5 mg once a week Sunday, DAWHistorical Med      pioglitazone (ACTOS) 15 MG tablet take 1 tablet by mouth once dailyHistorical Med      pantoprazole (PROTONIX) 40 MG tablet Take 40 mg by mouth daily Historical Med      ALPRAZolam (XANAX) 0.5 MG tablet as needed.  Historical Med      glimepiride (AMARYL) 4 MG tablet Take 4 mg by mouth 2 times dailyHistorical Med             Mini Parsons PA-C  5/31/2022

## 2022-06-06 DIAGNOSIS — M54.16 LUMBAR RADICULOPATHY: ICD-10-CM

## 2022-06-06 DIAGNOSIS — M51.26 LUMBAR HERNIATED DISC: ICD-10-CM

## 2022-06-06 RX ORDER — HYDROCODONE BITARTRATE AND ACETAMINOPHEN 5; 325 MG/1; MG/1
1 TABLET ORAL EVERY 4 HOURS PRN
Qty: 42 TABLET | Refills: 0 | Status: SHIPPED
Start: 2022-06-06 | End: 2022-06-06 | Stop reason: ALTCHOICE

## 2022-06-06 RX ORDER — CYCLOBENZAPRINE HCL 5 MG
5 TABLET ORAL 2 TIMES DAILY PRN
Qty: 30 TABLET | Refills: 0 | Status: SHIPPED
Start: 2022-06-06 | End: 2022-06-21 | Stop reason: SDUPTHER

## 2022-06-06 RX ORDER — OXYCODONE HYDROCHLORIDE 5 MG/1
5 TABLET ORAL EVERY 4 HOURS PRN
Qty: 42 TABLET | Refills: 0 | Status: SHIPPED
Start: 2022-06-06 | End: 2022-06-21 | Stop reason: SDUPTHER

## 2022-06-06 NOTE — TELEPHONE ENCOUNTER
Medication refill request      Medication pended on encounter      Pt states he feels that everything is healing well. He does have some pain in the right foot moving into his toes. Denies any swelling or redness.    Pt can be reached at [phone]

## 2022-06-07 ENCOUNTER — HOSPITAL ENCOUNTER (OUTPATIENT)
Age: 52
Discharge: HOME | End: 2022-06-09
Payer: COMMERCIAL

## 2022-06-07 ENCOUNTER — TELEPHONE (OUTPATIENT)
Dept: NEUROSURGERY | Age: 52
End: 2022-06-07

## 2022-06-07 ENCOUNTER — HOSPITAL ENCOUNTER (OUTPATIENT)
Dept: ULTRASOUND IMAGING | Age: 52
Discharge: HOME | End: 2022-06-09
Payer: COMMERCIAL

## 2022-06-07 DIAGNOSIS — Z98.890 HISTORY OF LUMBAR LAMINECTOMY: ICD-10-CM

## 2022-06-07 DIAGNOSIS — M79.604 RIGHT LEG PAIN: Primary | ICD-10-CM

## 2022-06-07 DIAGNOSIS — M79.604 RIGHT LEG PAIN: ICD-10-CM

## 2022-06-07 PROCEDURE — 93971 EXTREMITY STUDY: CPT

## 2022-06-07 NOTE — TELEPHONE ENCOUNTER
Patient wants a call back. He says his big toe can't bend and is painful. He says it started in his ankle. He had surgery recently. He is concerned about blood clot.   Phone

## 2022-06-07 NOTE — TELEPHONE ENCOUNTER
Called patient back. He is having right foot/big toe pain. Will order ultrasound RLE to r/o blood clot. It does sound more like gout. He will call us after ultrasound completed.

## 2022-06-21 ENCOUNTER — TELEPHONE (OUTPATIENT)
Dept: NEUROSURGERY | Age: 52
End: 2022-06-21

## 2022-06-21 DIAGNOSIS — M51.26 LUMBAR HERNIATED DISC: ICD-10-CM

## 2022-06-21 RX ORDER — OXYCODONE HYDROCHLORIDE 5 MG/1
5 TABLET ORAL EVERY 4 HOURS PRN
Qty: 42 TABLET | Refills: 0 | Status: SHIPPED
Start: 2022-06-21 | End: 2022-07-06 | Stop reason: SDUPTHER

## 2022-06-21 RX ORDER — CYCLOBENZAPRINE HCL 5 MG
5 TABLET ORAL 2 TIMES DAILY PRN
Qty: 30 TABLET | Refills: 0 | Status: SHIPPED
Start: 2022-06-21 | End: 2022-06-27 | Stop reason: SDUPTHER

## 2022-06-21 NOTE — TELEPHONE ENCOUNTER
Pt called today, needs 2 refills     Oxycodone HCL (no acetamin) 5 mg  Cyclobenzaprine 5 mg    [address]

## 2022-06-27 ENCOUNTER — OFFICE VISIT (OUTPATIENT)
Dept: NEUROSURGERY | Age: 52
End: 2022-06-27
Payer: COMMERCIAL

## 2022-06-27 ENCOUNTER — TELEPHONE (OUTPATIENT)
Dept: NEUROSURGERY | Age: 52
End: 2022-06-27

## 2022-06-27 VITALS
RESPIRATION RATE: 20 BRPM | HEART RATE: 80 BPM | OXYGEN SATURATION: 98 % | HEIGHT: 73 IN | TEMPERATURE: 98.1 F | WEIGHT: 302 LBS | SYSTOLIC BLOOD PRESSURE: 105 MMHG | DIASTOLIC BLOOD PRESSURE: 70 MMHG | BODY MASS INDEX: 40.02 KG/M2

## 2022-06-27 DIAGNOSIS — Z98.890 HISTORY OF LUMBAR LAMINECTOMY: ICD-10-CM

## 2022-06-27 DIAGNOSIS — M54.16 LUMBAR RADICULOPATHY: ICD-10-CM

## 2022-06-27 DIAGNOSIS — M51.26 LUMBAR HERNIATED DISC: Primary | ICD-10-CM

## 2022-06-27 PROCEDURE — 99024 POSTOP FOLLOW-UP VISIT: CPT | Performed by: PHYSICIAN ASSISTANT

## 2022-06-27 PROCEDURE — 99212 OFFICE O/P EST SF 10 MIN: CPT | Performed by: PHYSICIAN ASSISTANT

## 2022-06-27 RX ORDER — INSULIN GLARGINE-YFGN 100 [IU]/ML
INJECTION, SOLUTION SUBCUTANEOUS
COMMUNITY
Start: 2022-05-24

## 2022-06-27 RX ORDER — HYDROCODONE BITARTRATE AND ACETAMINOPHEN 5; 325 MG/1; MG/1
TABLET ORAL
COMMUNITY
Start: 2022-06-06 | End: 2022-08-22 | Stop reason: SDUPTHER

## 2022-06-27 RX ORDER — CYCLOBENZAPRINE HCL 5 MG
5 TABLET ORAL 2 TIMES DAILY PRN
Qty: 30 TABLET | Refills: 0 | Status: SHIPPED
Start: 2022-06-27 | End: 2022-07-06 | Stop reason: SDUPTHER

## 2022-06-27 NOTE — TELEPHONE ENCOUNTER
Pt called after his appt today, said his pcp called him after reading notes from this appt. PCP concerned that notes don't reflect that pt still has weakness in his left leg, as he has had.

## 2022-06-27 NOTE — PROGRESS NOTES
Post Operative Follow-up     This is a 54year old male who presents to the office for a 1 month follow-up s/p left L4-L5 hemilaminectomy      Subjective: Patient states he has been doing well. Admits to intermittent numbness down his left leg. Denies new pain, weakness, or issues with incision site. Physical Exam:              WDWN, no apparent distress              Non-labored breathing               Vitals Stable              Alert and oriented x3              CN 3-12 intact              PERRL              EOMI              NIELSON well              Left leg weaker compared to right              Decreased sensation LLE compared to RLE   Incision healing well without signs of infection                   Imaging: N/A     Assessment: This is a 54 y.o.  male presenting for a 1 month follow-up s/p left L4-L5 hemilaminectomy. Stable. Plan:  -Pain control and expectations discussed  -Continue restrictions   -Letter written and faxed to employer   -Can start PT in 4 weeks  -Refill flexeril sent to [address] [place] [address] report reviewed   -Follow-up in neurosurgery clinic in 2 months for 3 month follow up  -Call or return to neurosurgery office sooner if symptoms worsen or if new issues arise in the interim.     Electronically signed by Mayra Mcclure PA-C on 6/27/2022 at 10:27 AM

## 2022-07-06 ENCOUNTER — TELEPHONE (OUTPATIENT)
Dept: NEUROSURGERY | Age: 52
End: 2022-07-06

## 2022-07-06 DIAGNOSIS — M51.26 LUMBAR HERNIATED DISC: ICD-10-CM

## 2022-07-06 DIAGNOSIS — M54.16 LUMBAR RADICULOPATHY: ICD-10-CM

## 2022-07-06 RX ORDER — CYCLOBENZAPRINE HCL 5 MG
5 TABLET ORAL 2 TIMES DAILY PRN
Qty: 30 TABLET | Refills: 0 | Status: SHIPPED
Start: 2022-07-06 | End: 2022-07-25 | Stop reason: SDUPTHER

## 2022-07-06 RX ORDER — METHYLPREDNISOLONE 4 MG/1
TABLET ORAL
Qty: 1 KIT | Refills: 0 | Status: SHIPPED | OUTPATIENT
Start: 2022-07-06 | End: 2022-07-12

## 2022-07-06 RX ORDER — OXYCODONE HYDROCHLORIDE 5 MG/1
5 TABLET ORAL EVERY 4 HOURS PRN
Qty: 42 TABLET | Refills: 0 | Status: SHIPPED
Start: 2022-07-06 | End: 2022-07-25 | Stop reason: SDUPTHER

## 2022-07-06 RX ORDER — PREGABALIN 50 MG/1
50 CAPSULE ORAL 3 TIMES DAILY
Qty: 90 CAPSULE | Refills: 2 | Status: SHIPPED
Start: 2022-07-06 | End: 2022-10-03 | Stop reason: SDUPTHER

## 2022-07-06 NOTE — TELEPHONE ENCOUNTER
Patient wants to know if medication he was prescribed takes into account his kidney functions.   [address] [facility] phone

## 2022-07-06 NOTE — TELEPHONE ENCOUNTER
Pt called stating that he has been walking 30-50 yards and he feels that his hips are disjointed and inflamed. He is a little worried and would to know what to do. PLEASE call pt back. Pt can be reached at [phone]. He also needs medication refills. Stated that Dr Junito Locke @pain management told him he is under Dr Mario Soto care for his pain medication while in his 3 month post op period.      Lyrica and Flexeril were pended on encounter along with the oxycodone

## 2022-07-07 NOTE — TELEPHONE ENCOUNTER
Pt states he will not take this medication due to continued injury to his kidneys. Is there an alternative?

## 2022-07-07 NOTE — TELEPHONE ENCOUNTER
Phong Jamil prescribed a medro dose moreno, pt concerned regarding his kidney function. He mat not be able to take steroids.  Pt can be reached at [phone]

## 2022-07-07 NOTE — TELEPHONE ENCOUNTER
Thanks for the update. There is no alternative for the medrol dosepack. Continue to take pain medications as prescribed.

## 2022-07-08 ENCOUNTER — TELEPHONE (OUTPATIENT)
Dept: NEUROSURGERY | Age: 52
End: 2022-07-08

## 2022-07-08 NOTE — TELEPHONE ENCOUNTER
Patient called and wanted to let Aron Wanrer (and office) know that Dr. Nazia Selby said it was ok for him to take the Medrol Dose Pack. Patient notified Dr. Nazia Selby due to his kidney issues.

## 2022-07-15 ENCOUNTER — TELEPHONE (OUTPATIENT)
Dept: NEUROSURGERY | Age: 52
End: 2022-07-15

## 2022-07-15 DIAGNOSIS — Z98.890 S/P LAMINECTOMY: Primary | ICD-10-CM

## 2022-07-15 NOTE — TELEPHONE ENCOUNTER
Patient called requesting PT slip be faxed to [phone]. Patient stated he was told he could start PT around 7-8 wks and would like to start now.

## 2022-07-25 ENCOUNTER — TELEPHONE (OUTPATIENT)
Dept: NEUROSURGERY | Age: 52
End: 2022-07-25

## 2022-07-25 DIAGNOSIS — M51.26 LUMBAR HERNIATED DISC: ICD-10-CM

## 2022-07-25 RX ORDER — OXYCODONE HYDROCHLORIDE 5 MG/1
5 TABLET ORAL EVERY 4 HOURS PRN
Qty: 42 TABLET | Refills: 0 | Status: SHIPPED
Start: 2022-07-25 | End: 2022-08-09 | Stop reason: SDUPTHER

## 2022-07-25 RX ORDER — CYCLOBENZAPRINE HCL 5 MG
5 TABLET ORAL 2 TIMES DAILY PRN
Qty: 30 TABLET | Refills: 0 | Status: SHIPPED
Start: 2022-07-25 | End: 2022-08-09 | Stop reason: SDUPTHER

## 2022-07-25 NOTE — TELEPHONE ENCOUNTER
Patient is requesting refills of oxycodone and flexeril uses [facility] on [address] Mary Neumann you

## 2022-08-09 ENCOUNTER — TELEPHONE (OUTPATIENT)
Dept: NEUROSURGERY | Age: 52
End: 2022-08-09

## 2022-08-09 DIAGNOSIS — M51.26 LUMBAR HERNIATED DISC: ICD-10-CM

## 2022-08-09 RX ORDER — CYCLOBENZAPRINE HCL 5 MG
5 TABLET ORAL 2 TIMES DAILY PRN
Qty: 30 TABLET | Refills: 0 | Status: SHIPPED | OUTPATIENT
Start: 2022-08-09

## 2022-08-09 RX ORDER — OXYCODONE HYDROCHLORIDE 5 MG/1
5 TABLET ORAL EVERY 4 HOURS PRN
Qty: 42 TABLET | Refills: 0 | Status: SHIPPED | OUTPATIENT
Start: 2022-08-09 | End: 2022-08-16

## 2022-08-09 NOTE — TELEPHONE ENCOUNTER
Patient needs refill of Flexeril 5 mg and Oxycodone 5 mg sent to [facility] in [facility].

## 2022-08-22 ENCOUNTER — TELEPHONE (OUTPATIENT)
Dept: NEUROSURGERY | Age: 52
End: 2022-08-22

## 2022-08-22 ENCOUNTER — OFFICE VISIT (OUTPATIENT)
Dept: NEUROSURGERY | Age: 52
End: 2022-08-22

## 2022-08-22 VITALS
TEMPERATURE: 97.6 F | HEART RATE: 62 BPM | HEIGHT: 73 IN | SYSTOLIC BLOOD PRESSURE: 134 MMHG | BODY MASS INDEX: 40.02 KG/M2 | WEIGHT: 302 LBS | RESPIRATION RATE: 18 BRPM | OXYGEN SATURATION: 97 % | DIASTOLIC BLOOD PRESSURE: 87 MMHG

## 2022-08-22 DIAGNOSIS — Z98.890 S/P LAMINECTOMY: ICD-10-CM

## 2022-08-22 DIAGNOSIS — M54.16 LUMBAR RADICULOPATHY: ICD-10-CM

## 2022-08-22 DIAGNOSIS — M51.26 LUMBAR HERNIATED DISC: Primary | ICD-10-CM

## 2022-08-22 PROCEDURE — 99024 POSTOP FOLLOW-UP VISIT: CPT | Performed by: PHYSICIAN ASSISTANT

## 2022-08-22 PROCEDURE — 99212 OFFICE O/P EST SF 10 MIN: CPT | Performed by: PHYSICIAN ASSISTANT

## 2022-08-22 RX ORDER — HYDROCODONE BITARTRATE AND ACETAMINOPHEN 5; 325 MG/1; MG/1
1 TABLET ORAL EVERY 4 HOURS PRN
Qty: 42 TABLET | Refills: 0 | Status: SHIPPED | OUTPATIENT
Start: 2022-08-22 | End: 2022-08-29

## 2022-08-26 ENCOUNTER — TELEPHONE (OUTPATIENT)
Dept: NEUROSURGERY | Age: 52
End: 2022-08-26

## 2022-08-26 DIAGNOSIS — Z98.890 S/P LAMINECTOMY: Primary | ICD-10-CM

## 2022-08-26 RX ORDER — OXYCODONE HYDROCHLORIDE 5 MG/1
5 TABLET ORAL EVERY 6 HOURS PRN
Qty: 28 TABLET | Refills: 0 | Status: SHIPPED | OUTPATIENT
Start: 2022-08-26 | End: 2022-09-02

## 2022-08-26 NOTE — TELEPHONE ENCOUNTER
Noted. Call the office if pain does not dissipate or worsens. Will order x-rays and/or adjust medications and tx plan.

## 2022-08-26 NOTE — TELEPHONE ENCOUNTER
Called pt back to let him know. EXAM note    History  Bryce Shannon is a 33 year old male who generally is pretty healthy presents to me for physical exam.  States occasional fatigue but not too bad.  No chest pain, shortness of breath.  States both he and his wife go running for several miles 4 times a week.  Denies any joint problems    medical history  Past Medical History:   Diagnosis Date   • NO HX OF        SURGICAL history  Past Surgical History:   Procedure Laterality Date   • OPEN REPAIR THUMB FX/DISLOC Right 2007       social history  Social History     Social History   • Marital status:      Spouse name: N/A   • Number of children: N/A   • Years of education: N/A     Social History Main Topics   • Smoking status: Never Smoker   • Smokeless tobacco: None   • Alcohol use None   • Drug use: Unknown   • Sexual activity: Not Asked     Other Topics Concern   • None     Social History Narrative   • None       family history  Family History   Problem Relation Age of Onset   • Cancer Mother      cervical       mEDICATIONS  No current outpatient prescriptions on file.     No current facility-administered medications for this visit.        aLLERGIES  ALLERGIES:  No Known Allergies    Review of systems  All pertinent ROS reviewed and negative, only pertinent positives in HPI.    Physical Exam  Vital Signs:    Visit Vitals  /64   Pulse 56   Temp 98.5 °F (36.9 °C) (Oral)   Ht 5' 7.5\" (1.715 m)   Wt 74.7 kg   SpO2 98%   BMI 25.40 kg/m²      Wt Readings from Last 3 Encounters:   07/31/17 74.7 kg   10/30/15 73.9 kg   10/22/15 74.2 kg     Constitutional:  Well hydrated, appears stated age.  Integument:  Warm. Dry. No erythema. No rash.  HENT:  Normocephalic. Atraumatic. Bilateral external ears normal. Oropharynx moist. No oral exudates. Nose normal.  Neck: Normal range of motion. No tenderness. Supple. No stridor.  Eyes:  PERRL, EOMI. Conjunctivae normal. No discharge.  Cardiovascular:  Normal heart rate. Normal rhythm. No murmurs. No rubs.  No gallops.  Extremities: No edema, no clubbing, no cyanosis.  Respiratory:  Normal breath sounds. No respiratory distress. No wheezing. No chest tenderness.  GI:  Bowel sounds normal. Soft. No tenderness. No masses. No pulsatile masses.  Neurologic:  Alert & oriented x 3. Normal motor function. Normal sensory function. No focal deficits noted.      ASSESSMENT  1. Lipid screening    2. Fatigue, unspecified type    3. Annual physical exam        PLAN  Orders Placed This Encounter   • Comprehensive Metabolic Panel   • Lipid Panel with Reflex   • Thyroid Stimulating Hormone   • CBC & Auto Differential       No Follow-up on file.

## 2022-08-26 NOTE — TELEPHONE ENCOUNTER
Pt called stating that did some different exercises with PT on weds and woke up on Thursday with soreness and some numbness in bilateral legs. He did speak with PT regarding this and they will change his exercises. Pt wanted the office to know.

## 2022-08-26 NOTE — TELEPHONE ENCOUNTER
Patient says he needs rx sent to [facility] in [facility] on [address].  He was called in [address] [facility] but can't take  it. Patient says he can't take any medicine with Tylenol due to his kidney function. Oxycodone 5 mg immediate release is the rx he requests.

## 2022-08-30 ENCOUNTER — TELEPHONE (OUTPATIENT)
Dept: NEUROSURGERY | Age: 52
End: 2022-08-30

## 2022-08-30 DIAGNOSIS — M51.26 LUMBAR HERNIATED DISC: Primary | ICD-10-CM

## 2022-08-30 DIAGNOSIS — Z98.890 S/P LAMINECTOMY: ICD-10-CM

## 2022-08-30 DIAGNOSIS — M54.16 LUMBAR RADICULOPATHY: ICD-10-CM

## 2022-08-30 NOTE — TELEPHONE ENCOUNTER
Patient is questioning number of pills for Oxycodone.  His rx was for 28 pills and he thought it should be 42 from 8/26/22.  [id number]

## 2022-08-30 NOTE — TELEPHONE ENCOUNTER
He is over 3 months post op. No more pain medication will be prescribed. PCP can take over or we can refer to pain management.

## 2022-10-03 ENCOUNTER — OFFICE VISIT (OUTPATIENT)
Dept: PAIN MANAGEMENT | Age: 52
End: 2022-10-03
Payer: COMMERCIAL

## 2022-10-03 VITALS
SYSTOLIC BLOOD PRESSURE: 136 MMHG | DIASTOLIC BLOOD PRESSURE: 80 MMHG | RESPIRATION RATE: 16 BRPM | OXYGEN SATURATION: 97 % | HEART RATE: 76 BPM | HEIGHT: 73 IN | WEIGHT: 285 LBS | BODY MASS INDEX: 37.77 KG/M2 | TEMPERATURE: 97.1 F

## 2022-10-03 DIAGNOSIS — E66.9 OBESITY, UNSPECIFIED CLASSIFICATION, UNSPECIFIED OBESITY TYPE, UNSPECIFIED WHETHER SERIOUS COMORBIDITY PRESENT: ICD-10-CM

## 2022-10-03 DIAGNOSIS — Z98.890 S/P LUMBAR LAMINECTOMY: ICD-10-CM

## 2022-10-03 DIAGNOSIS — M51.36 DDD (DEGENERATIVE DISC DISEASE), LUMBAR: Primary | ICD-10-CM

## 2022-10-03 DIAGNOSIS — M47.817 LUMBOSACRAL SPONDYLOSIS WITHOUT MYELOPATHY: ICD-10-CM

## 2022-10-03 DIAGNOSIS — M54.16 LUMBAR RADICULOPATHY: ICD-10-CM

## 2022-10-03 PROCEDURE — 99213 OFFICE O/P EST LOW 20 MIN: CPT | Performed by: ANESTHESIOLOGY

## 2022-10-03 PROCEDURE — 99214 OFFICE O/P EST MOD 30 MIN: CPT | Performed by: ANESTHESIOLOGY

## 2022-10-03 RX ORDER — PREGABALIN 50 MG/1
50 CAPSULE ORAL 2 TIMES DAILY
Qty: 60 CAPSULE | Refills: 2 | Status: SHIPPED | OUTPATIENT
Start: 2022-10-03 | End: 2023-01-01

## 2022-10-03 RX ORDER — ALPRAZOLAM 1 MG/1
TABLET ORAL
COMMUNITY
Start: 2022-07-18

## 2022-10-03 RX ORDER — METHYLPHENIDATE HYDROCHLORIDE 10 MG/1
TABLET ORAL
COMMUNITY
Start: 2022-07-20

## 2022-10-03 RX ORDER — CYCLOBENZAPRINE HCL 5 MG
5 TABLET ORAL 2 TIMES DAILY PRN
Qty: 30 TABLET | Refills: 0 | Status: SHIPPED | OUTPATIENT
Start: 2022-10-03 | End: 2022-10-13

## 2022-10-03 NOTE — PROGRESS NOTES
[facility] Pain Management        [facility]  [facility], [address] [place] [address]  Dept: [phone]      Follow up Note      Shaniqua Mustafa     Date of Visit:  10/3/2022    CC:  Patient presents for follow up   Chief Complaint   Patient presents with    Follow-up     Low back pain - REEVALUATION AFTER SURGERY       HPI:    S/P Left-sided L4-L5 hemilaminotomy, left-sided L4-L5 medial  facetectomy, left-sided L4-L5 foraminotomy and left-sided L4-L5  Diskectomy- On 5/27/2022: by Dr. Zhou Brown. Has undergone surgery sicne the last visit- HERE FOR REEVALUATION    Significant improvement in radicular pain/ LE weakness. Has done PT/ rehab. Gets some pain on and off in the low back/ SIJ area. NO LE radicular pain. Had taken pain meds post op. Nursing notes and details of the pain history reviewed. Please see intake notes for details. Previous treatments:   Physical Therapy / HEP: yes      Chiropractic treatment: yes, for > 2 months recently     Medications: - NSAID's : yes                       - Membrane stabilizers : yes- gabapentin                       - Opioids : not on chronic opioids                       - Adjuvants or Others : yes     He has not been on anticoagulation medications      He has not been on herbal supplements. He is diabetic. H/O Smoking: no  H/O alcohol abuse : no  H/O Illicit drug use : no     Employment: employed- health care executive- IT contracts. Imaging studies:  Prior images reviewed. OARRS report::Reviewed.     Past Medical History:   Diagnosis Date    Acid reflux     ADHD     Anxiety     DM (diabetes mellitus) (HCC)     Left foot drop 2/3/2022    Left leg pain 2/3/2022    Left lumbar radiculitis 2/3/2022    Low back pain     Protein in urine     Dr Zamora       Past Surgical History:   Procedure Laterality Date    ENDOSCOPY, COLON, DIAGNOSTIC      LAMINECTOMY Left 5/27/2022    LEFT L4-L5 HEMILAMINECTOMY AND DISCECTOMY performed by Carmen Morris MD at [address] [place] [address] NERVE BLOCK Left 6/8/2021    LEFT LUMBAR MEDIAL BRANCH NERVE BLOCK UNDER FLUOROSCOPIC GUIDANCE AT L2, L3, L4 AND L5 DORSAL RAMI WITH SEDATION (CPT 27095) performed by Denver Muir, MD at [address] [place] [address] Left 8/19/2021    LEFT S1, S2, S3 & L5 DORSAL RAMUS  BLOCK UNDER FLUOROSCOPY performed by Denver Muir, MD at [facility] Left 7/1/2021    LUMBAR EPIDURAL STEROID INJECTION UNDER FLUOROSCOPIC GUIDANCE AT L4-L5 LEFT PARAMEDIAN LEFT SACROILIAC JOINT INJECTION performed by Denver Muir, MD at [facility] Left 12/13/2021    RADIOFREQUENCY ABLATION OF LEFT LUMBAR L4, L5, S1 UNDER FLUOROSCOPY performed by Denver Muir, MD at [address] [place] [address]  10/2020       Prior to Admission medications    Medication Sig Start Date End Date Taking? Authorizing Provider   ALPRAZolam (XANAX) 1 MG tablet TAKE 1 TABLET BY MOUTH TWICE DAILY AS NEEDED 7/18/22  Yes Historical Provider, MD   methylphenidate (RITALIN) 10 MG tablet TAKE 1 TABLET BY MOUTH ONCE DAILY FOR 90 DAYS 7/20/22  Yes Historical Provider, MD   pregabalin (LYRICA) 50 MG capsule Take 1 capsule by mouth 2 times daily for 90 days. 10/3/22 1/1/23 Yes Denver Muir, MD   cyclobenzaprine (FLEXERIL) 5 MG tablet Take 1 tablet by mouth 2 times daily as needed for Muscle spasms 10/3/22 10/13/22 Yes Denver Muir, MD   cyclobenzaprine (FLEXERIL) 5 MG tablet Take 1 tablet by mouth 2 times daily as needed for Muscle spasms Usually takes only once a day 8/9/22  Yes POLLY Cabrera   methylphenidate 27 MG CR tablet Take 27 mg by mouth daily.  4/19/22  Yes Historical Provider, MD   Continuous Blood Gluc Sensor (FREESTYLE VIDAL 2 SENSOR) MISC Inject 1 patch into the skin every 14 days 4/14/22  Yes Historical Provider, MD   LANTUS SOLOSTAR 100 UNIT/ML injection pen Inject 32 Units into the skin nightly  6/7/21  Yes Historical Provider, MD   Lidocaine 1.8 % PTCH Apply patch 12 hours and remove for 12 hours 6/24/21  Yes Denver Muir, MD   TRULICITY 1.5 GI/8.4EW SOPN 1.5 mg once a week Sukh 10/16/20  Yes Historical Provider, MD   pioglitazone (ACTOS) 15 MG tablet take 1 tablet by mouth once daily 10/14/20  Yes Historical Provider, MD   pantoprazole (PROTONIX) 40 MG tablet Take 40 mg by mouth daily    Yes Historical Provider, MD   ALPRAZolam Frank Reyna) 0.5 MG tablet as needed. Yes Historical Provider, MD   glimepiride (AMARYL) 4 MG tablet Take 4 mg by mouth 2 times daily   Yes Historical Provider, MD Kaveh Boone, 100 UNIT/ML SOPN INJECT 32 UNITS SUBCUTANEOUSLY AT BEDTIME  Patient not taking: Reported on 10/3/2022 5/24/22   Historical Provider, MD       No Known Allergies    Social History     Socioeconomic History    Marital status:      Spouse name: Not on file    Number of children: Not on file    Years of education: Not on file    Highest education level: Not on file   Occupational History    Not on file   Tobacco Use    Smoking status: Never    Smokeless tobacco: Never   Vaping Use    Vaping Use: Never used   Substance and Sexual Activity    Alcohol use: Yes     Comment: Socially    Drug use: Never    Sexual activity: Not on file   Other Topics Concern    Not on file   Social History Narrative    Not on file     Social Determinants of Health     Financial Resource Strain: Not on file   Food Insecurity: Not on file   Transportation Needs: Not on file   Physical Activity: Not on file   Stress: Not on file   Social Connections: Not on file   Intimate Partner Violence: Not on file   Housing Stability: Not on file       Family History   Adopted: Yes       REVIEW OF SYSTEMS:     Genaro denies fever/chills, chest pain, shortness of breath, new bowel or bladder complaints. All other review of systems was negative.     PHYSICAL EXAMINATION:      /80   Pulse 76   Temp 97.1 °F (36.2 °C)   Resp 16   Ht 6' 1\" (1.854 m)   Wt 285 lb (129.3 kg)   SpO2 97%   BMI 37.60 kg/m²   General:       General appearance:  Pleasant and well-hydrated, in no distress and A & O x 3  Build:Obese  Function: Rises from seated position easily and Moves about room without difficulty     HEENT:     Head:normocephalic, atraumatic        Lungs:     Breathing:normal breathing pattern      CVS:     RRR     Abdomen:     Shape:obese, non-distended and normal     Cervical spine:     Inspection:normal     Thoracic spine:                Spine inspection:normal      Lumbar spine:     Spine inspection: sacr healed well. Palpation: Tenderness paravertebral muscles Yes  Range of motion: Decreased, flexion Decreased, Lateral bending, extension and rotation bilaterally reduced is painful. Lumbar facet tenderness  Sacroiliac joint tenderness   Piriformis tenderness: negative bilaterally  SLR : negative bilaterally     Musculoskeletal:     Trigger points no     Extremities:     Tremors:None bilaterally upper and lower  Edema:no     Neurological:     Sensory: Normal to light touch      Motor:                 Right Quadriceps 5/5          Left Quadriceps 5/5           Right Gastrocnemius 5/5    Left Gastrocnemius 5/5  Right Ant Tibialis 55  Left Ant Tibialis 5/5      Gait:normal Yes     Dermatology:     Skin:no rashes or lesions noted     Assessment/Plan:   Diagnosis Orders   1. DDD (degenerative disc disease), lumbar  Ambulatory referral to Physical Therapy      2. Lumbosacral spondylosis without myelopathy  Ambulatory referral to Physical Therapy      3. Obesity, unspecified classification, unspecified obesity type, unspecified whether serious comorbidity present        4. Lumbar radiculopathy  pregabalin (LYRICA) 50 MG capsule      5. S/P lumbar laminectomy  Ambulatory referral to Physical Therapy        S/P left hemilaminectomy/ Discectomy/ decompression by Dr. Micheal Marks in May 2022. Did very well and has noted significant improvement in radicular symptoms.     Has some axial low back pain. Facet tenderness. Physical therapy    Has oxycodone from prior script. For prn use. Recommend to take only as needed. Compound cream  for local use. IF continued pain, consider Facet MBNB. Weight loss. Lyrica - restart 50 mg bid. E-scribed. Flexeril for prn use for muscle spasm. F/U in 3-4 months.     Sebastian Vasquez MD

## 2022-10-03 NOTE — PROGRESS NOTES
Do you currently have any of the following:    Fever: No  Headache:  No  Cough: No  Shortness of breath: No  Exposed to anyone with these symptoms: No         Genaro TONI Henao presents to the [address] [place] [address] on 10/3/2022. Fish Lloyd is complaining of pain in his low back. The pain is constant. The pain is described as aching, dull, sharp, and miserable. Pain is rated on his best day at a 0, on his worst day at a 8, and on average at a 3 on the VAS scale. He took his last dose of Lyrica and Neurontin 4 days ago. Any procedures since your last visit: No    Pacemaker or defibrillator: No    He is not on NSAIDS and is not on anticoagulation medications. Medication Contract and Consent for Opioid Use Documents Filed        No documents found                    /80   Pulse 76   Temp 97.1 °F (36.2 °C)   Resp 16   Ht 6' 1\" (1.854 m)   Wt 285 lb (129.3 kg)   SpO2 97%   BMI 37.60 kg/m²      No LMP for male patient.

## 2022-11-07 ENCOUNTER — HOSPITAL ENCOUNTER (OUTPATIENT)
Age: 52
Discharge: HOME | End: 2022-11-07
Payer: COMMERCIAL

## 2022-11-07 LAB
ALBUMIN SERPL-MCNC: 4.7 G/DL (ref 3.5–5.2)
ALP BLD-CCNC: 79 U/L (ref 40–129)
ALT SERPL-CCNC: 25 U/L (ref 0–40)
ANION GAP SERPL CALCULATED.3IONS-SCNC: 11 MMOL/L (ref 7–16)
AST SERPL-CCNC: 25 U/L (ref 0–39)
BASOPHILS ABSOLUTE: 0.07 E9/L (ref 0–0.2)
BASOPHILS RELATIVE PERCENT: 0.7 % (ref 0–2)
BILIRUB SERPL-MCNC: 0.6 MG/DL (ref 0–1.2)
BUN BLDV-MCNC: 13 MG/DL (ref 6–20)
CALCIUM SERPL-MCNC: 9.9 MG/DL (ref 8.6–10.2)
CHLORIDE BLD-SCNC: 101 MMOL/L (ref 98–107)
CHOLESTEROL, TOTAL: 175 MG/DL (ref 0–199)
CO2: 27 MMOL/L (ref 22–29)
CREAT SERPL-MCNC: 1.1 MG/DL (ref 0.7–1.2)
CREATININE URINE: 195 MG/DL (ref 40–278)
EOSINOPHILS ABSOLUTE: 0.17 E9/L (ref 0.05–0.5)
EOSINOPHILS RELATIVE PERCENT: 1.8 % (ref 0–6)
GFR SERPL CREATININE-BSD FRML MDRD: >60 ML/MIN/1.73
GLUCOSE BLD-MCNC: 143 MG/DL (ref 74–99)
HBA1C MFR BLD: 6.8 % (ref 4–5.6)
HCT VFR BLD CALC: 50.4 % (ref 37–54)
HDLC SERPL-MCNC: 53 MG/DL
HEMOGLOBIN: 16.9 G/DL (ref 12.5–16.5)
IMMATURE GRANULOCYTES #: 0.07 E9/L
IMMATURE GRANULOCYTES %: 0.7 % (ref 0–5)
LDL CHOLESTEROL CALCULATED: 92 MG/DL (ref 0–99)
LYMPHOCYTES ABSOLUTE: 1.76 E9/L (ref 1.5–4)
LYMPHOCYTES RELATIVE PERCENT: 18.3 % (ref 20–42)
MCH RBC QN AUTO: 31 PG (ref 26–35)
MCHC RBC AUTO-ENTMCNC: 33.5 % (ref 32–34.5)
MCV RBC AUTO: 92.5 FL (ref 80–99.9)
MICROALBUMIN UR-MCNC: 1113.5 MG/L
MICROALBUMIN/CREAT UR-RTO: 571 (ref 0–30)
MONOCYTES ABSOLUTE: 0.88 E9/L (ref 0.1–0.95)
MONOCYTES RELATIVE PERCENT: 9.1 % (ref 2–12)
NEUTROPHILS ABSOLUTE: 6.68 E9/L (ref 1.8–7.3)
NEUTROPHILS RELATIVE PERCENT: 69.4 % (ref 43–80)
PARATHYROID HORMONE INTACT: 42 PG/ML (ref 15–65)
PDW BLD-RTO: 13.1 FL (ref 11.5–15)
PHOSPHORUS: 2.8 MG/DL (ref 2.5–4.5)
PLATELET # BLD: 346 E9/L (ref 130–450)
PMV BLD AUTO: 9.3 FL (ref 7–12)
POTASSIUM SERPL-SCNC: 3.9 MMOL/L (ref 3.5–5)
RBC # BLD: 5.45 E12/L (ref 3.8–5.8)
SODIUM BLD-SCNC: 139 MMOL/L (ref 132–146)
TOTAL PROTEIN: 7.8 G/DL (ref 6.4–8.3)
TRIGL SERPL-MCNC: 149 MG/DL (ref 0–149)
VITAMIN D 25-HYDROXY: 32 NG/ML (ref 30–100)
VLDLC SERPL CALC-MCNC: 30 MG/DL
WBC # BLD: 9.6 E9/L (ref 4.5–11.5)

## 2022-11-07 PROCEDURE — 83036 HEMOGLOBIN GLYCOSYLATED A1C: CPT

## 2022-11-07 PROCEDURE — 83970 ASSAY OF PARATHORMONE: CPT

## 2022-11-07 PROCEDURE — 36415 COLL VENOUS BLD VENIPUNCTURE: CPT

## 2022-11-07 PROCEDURE — 82306 VITAMIN D 25 HYDROXY: CPT

## 2022-11-07 PROCEDURE — 82044 UR ALBUMIN SEMIQUANTITATIVE: CPT

## 2022-11-07 PROCEDURE — 80061 LIPID PANEL: CPT

## 2022-11-07 PROCEDURE — 82570 ASSAY OF URINE CREATININE: CPT

## 2022-11-07 PROCEDURE — 84100 ASSAY OF PHOSPHORUS: CPT

## 2022-11-07 PROCEDURE — 80053 COMPREHEN METABOLIC PANEL: CPT

## 2022-11-07 PROCEDURE — 85025 COMPLETE CBC W/AUTO DIFF WBC: CPT

## 2023-06-27 ENCOUNTER — OFFICE VISIT (OUTPATIENT)
Dept: NEUROSURGERY | Age: 53
End: 2023-06-27
Payer: COMMERCIAL

## 2023-06-27 VITALS — HEIGHT: 73 IN | WEIGHT: 285 LBS | BODY MASS INDEX: 37.77 KG/M2

## 2023-06-27 DIAGNOSIS — M51.26 LUMBAR HERNIATED DISC: Primary | ICD-10-CM

## 2023-06-27 PROCEDURE — 99213 OFFICE O/P EST LOW 20 MIN: CPT | Performed by: PHYSICIAN ASSISTANT

## 2023-06-27 PROCEDURE — 99212 OFFICE O/P EST SF 10 MIN: CPT

## 2023-12-09 ENCOUNTER — APPOINTMENT (OUTPATIENT)
Dept: ULTRASOUND IMAGING | Age: 53
End: 2023-12-09
Payer: COMMERCIAL

## 2023-12-09 ENCOUNTER — APPOINTMENT (OUTPATIENT)
Dept: GENERAL RADIOLOGY | Age: 53
End: 2023-12-09
Payer: COMMERCIAL

## 2023-12-09 ENCOUNTER — HOSPITAL ENCOUNTER (EMERGENCY)
Age: 53
Discharge: LEFT BEFORE DISCHARGE | End: 2023-12-09
Payer: COMMERCIAL

## 2023-12-09 VITALS
SYSTOLIC BLOOD PRESSURE: 147 MMHG | DIASTOLIC BLOOD PRESSURE: 49 MMHG | OXYGEN SATURATION: 99 % | HEART RATE: 95 BPM | HEIGHT: 73 IN | WEIGHT: 285 LBS | TEMPERATURE: 98.6 F | RESPIRATION RATE: 16 BRPM | BODY MASS INDEX: 37.77 KG/M2

## 2023-12-09 DIAGNOSIS — M77.8 TENDINITIS OF RIGHT FOREARM: ICD-10-CM

## 2023-12-09 DIAGNOSIS — M25.511 ACUTE PAIN OF RIGHT SHOULDER: ICD-10-CM

## 2023-12-09 DIAGNOSIS — M77.8 RIGHT SHOULDER TENDONITIS: ICD-10-CM

## 2023-12-09 DIAGNOSIS — Z53.21 ELOPED FROM EMERGENCY DEPARTMENT: Primary | ICD-10-CM

## 2023-12-09 PROCEDURE — 93971 EXTREMITY STUDY: CPT

## 2023-12-09 PROCEDURE — 99284 EMERGENCY DEPT VISIT MOD MDM: CPT

## 2023-12-09 PROCEDURE — 73030 X-RAY EXAM OF SHOULDER: CPT

## 2023-12-09 PROCEDURE — 73090 X-RAY EXAM OF FOREARM: CPT

## 2023-12-09 PROCEDURE — 73060 X-RAY EXAM OF HUMERUS: CPT

## 2023-12-09 ASSESSMENT — PAIN DESCRIPTION - ORIENTATION: ORIENTATION: RIGHT

## 2023-12-09 ASSESSMENT — PAIN DESCRIPTION - FREQUENCY: FREQUENCY: CONTINUOUS

## 2023-12-09 ASSESSMENT — PAIN DESCRIPTION - ONSET: ONSET: ON-GOING

## 2023-12-09 ASSESSMENT — PAIN - FUNCTIONAL ASSESSMENT
PAIN_FUNCTIONAL_ASSESSMENT: 0-10
PAIN_FUNCTIONAL_ASSESSMENT: PREVENTS OR INTERFERES SOME ACTIVE ACTIVITIES AND ADLS

## 2023-12-09 ASSESSMENT — PAIN DESCRIPTION - LOCATION: LOCATION: SHOULDER

## 2023-12-09 ASSESSMENT — PAIN DESCRIPTION - DESCRIPTORS: DESCRIPTORS: OTHER (COMMENT)

## 2023-12-09 ASSESSMENT — PAIN DESCRIPTION - PAIN TYPE: TYPE: ACUTE PAIN

## 2023-12-09 ASSESSMENT — PAIN SCALES - GENERAL: PAINLEVEL_OUTOF10: 10

## 2023-12-09 NOTE — ED NOTES
After multiple attempts, Pt unable to be located; provider notified     Bonnie Fenton RN  12/09/23 [phone]

## 2023-12-09 NOTE — ED PROVIDER NOTES
Independent [facility] Visit. Department of Emergency Medicine   ED  Provider Note  Admit Date/RoomTime: 12/9/2023  2:54 PM  ED Room: ROC/ROC    Chief Complaint:   Shoulder Pain (R shoulder pain x2 weeks, denies injuries radiates into elbow)    History of Present Illness      Nilda John is a 48 y.o. old male presents to the emergency department for the right shoulder pain that radiates into his right forearm. Patient states symptoms been ongoing for the past 2 weeks. He denies any direct injury or trauma. He states he has had recent travel. He has no limb swelling. He states he occasionally gets numbness into his index finger. He has full range of motion but does have pain with movement of his shoulder and forearm. He states the majority of his pain is located just below his elbow. He denies chest pain, shortness of breath, pain with breathing, abdominal pain, nausea, vomiting, diarrhea, fever/chills, rash, skin color changes, or difficulty with ambulation or balance. Patient is alert and oriented x 3 and in no apparent distress at this exam.  He is nontoxic-appearing. Patient denies any past history of shoulder problems. PCP: Maggie Coughlin MD  Ortho: None    ROS   Pertinent positives and negatives are stated within HPI, all other systems reviewed and are negative.     Past Medical History:   Past Medical History:   Diagnosis Date    Acid reflux     ADHD     Anxiety     DM (diabetes mellitus) (HCC)     Left foot drop 2/3/2022    Left leg pain 2/3/2022    Left lumbar radiculitis 2/3/2022    Low back pain     Protein in urine     Dr Zamora     Surgical History:   Past Surgical History:   Procedure Laterality Date    ENDOSCOPY, COLON, DIAGNOSTIC      LAMINECTOMY Left 5/27/2022    LEFT L4-L5 HEMILAMINECTOMY AND DISCECTOMY performed by Rai Saucedo MD at [facility] Left 6/8/2021    LEFT LUMBAR MEDIAL BRANCH NERVE BLOCK UNDER FLUOROSCOPIC GUIDANCE AT L2, L3, L4 AND L5 DORSAL RAMI WITH SEDATION

## 2025-03-10 ENCOUNTER — APPOINTMENT (OUTPATIENT)
Dept: GENERAL RADIOLOGY | Age: 55
End: 2025-03-10
Payer: COMMERCIAL

## 2025-03-10 ENCOUNTER — APPOINTMENT (OUTPATIENT)
Dept: MRI IMAGING | Age: 55
End: 2025-03-10
Payer: COMMERCIAL

## 2025-03-10 ENCOUNTER — APPOINTMENT (OUTPATIENT)
Dept: CT IMAGING | Age: 55
End: 2025-03-10
Payer: COMMERCIAL

## 2025-03-10 ENCOUNTER — HOSPITAL ENCOUNTER (EMERGENCY)
Age: 55
Discharge: ANOTHER HOSPITAL | End: 2025-03-11
Attending: EMERGENCY MEDICINE
Payer: COMMERCIAL

## 2025-03-10 DIAGNOSIS — I63.9 CEREBROVASCULAR ACCIDENT (CVA), UNSPECIFIED MECHANISM (HCC): Primary | ICD-10-CM

## 2025-03-10 LAB
ANION GAP SERPL CALCULATED.3IONS-SCNC: 10 MMOL/L (ref 7–16)
BASOPHILS # BLD: 0.08 K/UL (ref 0–0.2)
BASOPHILS NFR BLD: 1 % (ref 0–2)
BUN SERPL-MCNC: 12 MG/DL (ref 6–20)
CALCIUM SERPL-MCNC: 9.7 MG/DL (ref 8.6–10.2)
CHLORIDE SERPL-SCNC: 103 MMOL/L (ref 98–107)
CO2 SERPL-SCNC: 27 MMOL/L (ref 22–29)
CREAT SERPL-MCNC: 1.3 MG/DL (ref 0.7–1.2)
EOSINOPHIL # BLD: 0.34 K/UL (ref 0.05–0.5)
EOSINOPHILS RELATIVE PERCENT: 3 % (ref 0–6)
ERYTHROCYTE [DISTWIDTH] IN BLOOD BY AUTOMATED COUNT: 13.5 % (ref 11.5–15)
GFR, ESTIMATED: 67 ML/MIN/1.73M2
GLUCOSE BLD-MCNC: 112 MG/DL (ref 74–99)
GLUCOSE BLD-MCNC: 157 MG/DL (ref 74–99)
GLUCOSE SERPL-MCNC: 166 MG/DL (ref 74–99)
HCT VFR BLD AUTO: 48.3 % (ref 37–54)
HGB BLD-MCNC: 16.3 G/DL (ref 12.5–16.5)
IMM GRANULOCYTES # BLD AUTO: 0.08 K/UL (ref 0–0.58)
IMM GRANULOCYTES NFR BLD: 1 % (ref 0–5)
LYMPHOCYTES NFR BLD: 2.28 K/UL (ref 1.5–4)
LYMPHOCYTES RELATIVE PERCENT: 20 % (ref 20–42)
MCH RBC QN AUTO: 32 PG (ref 26–35)
MCHC RBC AUTO-ENTMCNC: 33.7 G/DL (ref 32–34.5)
MCV RBC AUTO: 94.9 FL (ref 80–99.9)
MONOCYTES NFR BLD: 1.37 K/UL (ref 0.1–0.95)
MONOCYTES NFR BLD: 12 % (ref 2–12)
NEUTROPHILS NFR BLD: 65 % (ref 43–80)
NEUTS SEG NFR BLD: 7.57 K/UL (ref 1.8–7.3)
PLATELET # BLD AUTO: 327 K/UL (ref 130–450)
PMV BLD AUTO: 9.6 FL (ref 7–12)
POTASSIUM SERPL-SCNC: 4.5 MMOL/L (ref 3.5–5)
RBC # BLD AUTO: 5.09 M/UL (ref 3.8–5.8)
SODIUM SERPL-SCNC: 140 MMOL/L (ref 132–146)
TROPONIN I SERPL HS-MCNC: 10 NG/L (ref 0–11)
WBC OTHER # BLD: 11.7 K/UL (ref 4.5–11.5)

## 2025-03-10 PROCEDURE — 80048 BASIC METABOLIC PNL TOTAL CA: CPT

## 2025-03-10 PROCEDURE — 6370000000 HC RX 637 (ALT 250 FOR IP)

## 2025-03-10 PROCEDURE — 85025 COMPLETE CBC W/AUTO DIFF WBC: CPT

## 2025-03-10 PROCEDURE — 82962 GLUCOSE BLOOD TEST: CPT

## 2025-03-10 PROCEDURE — 70498 CT ANGIOGRAPHY NECK: CPT

## 2025-03-10 PROCEDURE — 71045 X-RAY EXAM CHEST 1 VIEW: CPT

## 2025-03-10 PROCEDURE — 2500000003 HC RX 250 WO HCPCS

## 2025-03-10 PROCEDURE — 84484 ASSAY OF TROPONIN QUANT: CPT

## 2025-03-10 PROCEDURE — 99285 EMERGENCY DEPT VISIT HI MDM: CPT

## 2025-03-10 PROCEDURE — 70551 MRI BRAIN STEM W/O DYE: CPT

## 2025-03-10 PROCEDURE — 70450 CT HEAD/BRAIN W/O DYE: CPT

## 2025-03-10 PROCEDURE — 6360000004 HC RX CONTRAST MEDICATION: Performed by: RADIOLOGY

## 2025-03-10 PROCEDURE — 70496 CT ANGIOGRAPHY HEAD: CPT

## 2025-03-10 RX ORDER — INSULIN GLARGINE 100 [IU]/ML
15 INJECTION, SOLUTION SUBCUTANEOUS NIGHTLY
Status: DISCONTINUED | OUTPATIENT
Start: 2025-03-11 | End: 2025-03-11 | Stop reason: HOSPADM

## 2025-03-10 RX ORDER — SENNOSIDES A AND B 8.6 MG/1
1 TABLET, FILM COATED ORAL DAILY PRN
Status: CANCELLED | OUTPATIENT
Start: 2025-03-10

## 2025-03-10 RX ORDER — ASPIRIN 81 MG/1
324 TABLET, CHEWABLE ORAL ONCE
Status: COMPLETED | OUTPATIENT
Start: 2025-03-10 | End: 2025-03-10

## 2025-03-10 RX ORDER — SODIUM CHLORIDE 0.9 % (FLUSH) 0.9 %
10 SYRINGE (ML) INJECTION ONCE
Status: COMPLETED | OUTPATIENT
Start: 2025-03-10 | End: 2025-03-10

## 2025-03-10 RX ORDER — IOPAMIDOL 755 MG/ML
75 INJECTION, SOLUTION INTRAVASCULAR
Status: COMPLETED | OUTPATIENT
Start: 2025-03-10 | End: 2025-03-10

## 2025-03-10 RX ORDER — CLOPIDOGREL BISULFATE 75 MG/1
300 TABLET ORAL ONCE
Status: COMPLETED | OUTPATIENT
Start: 2025-03-10 | End: 2025-03-10

## 2025-03-10 RX ORDER — ONDANSETRON 2 MG/ML
4 INJECTION INTRAMUSCULAR; INTRAVENOUS EVERY 6 HOURS PRN
Status: CANCELLED | OUTPATIENT
Start: 2025-03-10

## 2025-03-10 RX ORDER — SODIUM CHLORIDE 0.9 % (FLUSH) 0.9 %
10 SYRINGE (ML) INJECTION PRN
Status: CANCELLED | OUTPATIENT
Start: 2025-03-10

## 2025-03-10 RX ORDER — ONDANSETRON 4 MG/1
4 TABLET, ORALLY DISINTEGRATING ORAL EVERY 8 HOURS PRN
Status: CANCELLED | OUTPATIENT
Start: 2025-03-10

## 2025-03-10 RX ORDER — ACETAMINOPHEN 650 MG/1
650 SUPPOSITORY RECTAL EVERY 6 HOURS PRN
Status: CANCELLED | OUTPATIENT
Start: 2025-03-10

## 2025-03-10 RX ORDER — SODIUM CHLORIDE 0.9 % (FLUSH) 0.9 %
10 SYRINGE (ML) INJECTION ONCE
Status: DISCONTINUED | OUTPATIENT
Start: 2025-03-10 | End: 2025-03-10

## 2025-03-10 RX ORDER — SODIUM CHLORIDE 0.9 % (FLUSH) 0.9 %
5-40 SYRINGE (ML) INJECTION PRN
Status: DISCONTINUED | OUTPATIENT
Start: 2025-03-10 | End: 2025-03-11 | Stop reason: HOSPADM

## 2025-03-10 RX ORDER — ACETAMINOPHEN 325 MG/1
650 TABLET ORAL EVERY 6 HOURS PRN
Status: CANCELLED | OUTPATIENT
Start: 2025-03-10

## 2025-03-10 RX ORDER — SODIUM CHLORIDE 9 MG/ML
INJECTION, SOLUTION INTRAVENOUS PRN
Status: CANCELLED | OUTPATIENT
Start: 2025-03-10

## 2025-03-10 RX ORDER — SODIUM CHLORIDE 9 MG/ML
INJECTION, SOLUTION INTRAVENOUS PRN
Status: DISCONTINUED | OUTPATIENT
Start: 2025-03-10 | End: 2025-03-11 | Stop reason: HOSPADM

## 2025-03-10 RX ORDER — SODIUM CHLORIDE 0.9 % (FLUSH) 0.9 %
10 SYRINGE (ML) INJECTION EVERY 12 HOURS SCHEDULED
Status: CANCELLED | OUTPATIENT
Start: 2025-03-10

## 2025-03-10 RX ORDER — ENOXAPARIN SODIUM 100 MG/ML
30 INJECTION SUBCUTANEOUS 2 TIMES DAILY
Status: CANCELLED | OUTPATIENT
Start: 2025-03-10

## 2025-03-10 RX ORDER — SODIUM CHLORIDE 0.9 % (FLUSH) 0.9 %
5-40 SYRINGE (ML) INJECTION EVERY 12 HOURS SCHEDULED
Status: DISCONTINUED | OUTPATIENT
Start: 2025-03-10 | End: 2025-03-11 | Stop reason: HOSPADM

## 2025-03-10 RX ORDER — FINERENONE 10 MG/1
10 TABLET, FILM COATED ORAL EVERY MORNING
COMMUNITY

## 2025-03-10 RX ADMIN — ASPIRIN 324 MG: 81 TABLET, CHEWABLE ORAL at 12:46

## 2025-03-10 RX ADMIN — CLOPIDOGREL BISULFATE 300 MG: 75 TABLET ORAL at 12:45

## 2025-03-10 RX ADMIN — SODIUM CHLORIDE, PRESERVATIVE FREE 10 ML: 5 INJECTION INTRAVENOUS at 18:00

## 2025-03-10 RX ADMIN — IOPAMIDOL 75 ML: 755 INJECTION, SOLUTION INTRAVENOUS at 10:28

## 2025-03-10 NOTE — ED NOTES
RN, pharmacist and physician has spoken with patient about plan of care and interventions. All questions and concerns addressed at this time. No further questions at this time. Pt declined TNK
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
## 2025-03-10 NOTE — ED NOTES
RN has spoken with patient about plan of care and interventions. All questions and concerns addressed at this time. Pt will speak with ED physician regarding on going decision making following neurology telehealth
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
## 2025-03-11 ENCOUNTER — HOSPITAL ENCOUNTER (INPATIENT)
Age: 55
LOS: 3 days | Discharge: HOME | End: 2025-03-14
Attending: STUDENT IN AN ORGANIZED HEALTH CARE EDUCATION/TRAINING PROGRAM | Admitting: STUDENT IN AN ORGANIZED HEALTH CARE EDUCATION/TRAINING PROGRAM
Payer: COMMERCIAL

## 2025-03-11 ENCOUNTER — APPOINTMENT (OUTPATIENT)
Age: 55
End: 2025-03-11
Attending: EMERGENCY MEDICINE
Payer: COMMERCIAL

## 2025-03-11 VITALS
TEMPERATURE: 97.7 F | WEIGHT: 240.1 LBS | HEART RATE: 67 BPM | OXYGEN SATURATION: 95 % | BODY MASS INDEX: 31.82 KG/M2 | RESPIRATION RATE: 12 BRPM | DIASTOLIC BLOOD PRESSURE: 77 MMHG | HEIGHT: 73 IN | SYSTOLIC BLOOD PRESSURE: 121 MMHG

## 2025-03-11 DIAGNOSIS — R20.0 LT FACIAL NUMBNESS: Primary | ICD-10-CM

## 2025-03-11 DIAGNOSIS — R29.810 FACIAL DROOP: ICD-10-CM

## 2025-03-11 PROBLEM — I63.9 ACUTE CEREBROVASCULAR ACCIDENT (CVA) (HCC): Status: ACTIVE | Noted: 2025-03-11

## 2025-03-11 LAB
ALBUMIN SERPL-MCNC: 3.8 G/DL (ref 3.5–5.2)
ALP SERPL-CCNC: 59 U/L (ref 40–129)
ALT SERPL-CCNC: 25 U/L (ref 0–40)
ANION GAP SERPL CALCULATED.3IONS-SCNC: 10 MMOL/L (ref 7–16)
AST SERPL-CCNC: 25 U/L (ref 0–39)
BASOPHILS # BLD: 0.06 K/UL (ref 0–0.2)
BASOPHILS NFR BLD: 1 % (ref 0–2)
BILIRUB SERPL-MCNC: 0.3 MG/DL (ref 0–1.2)
BUN SERPL-MCNC: 12 MG/DL (ref 6–20)
CALCIUM SERPL-MCNC: 8.1 MG/DL (ref 8.6–10.2)
CHLORIDE SERPL-SCNC: 107 MMOL/L (ref 98–107)
CHP ED QC CHECK: NORMAL
CO2 SERPL-SCNC: 22 MMOL/L (ref 22–29)
CREAT SERPL-MCNC: 1.1 MG/DL (ref 0.7–1.2)
ECHO AO ASC DIAM: 2.9 CM
ECHO AO ASCENDING AORTA INDEX: 1.24 CM/M2
ECHO AV AREA PEAK VELOCITY: 2.8 CM2
ECHO AV AREA VTI: 2.7 CM2
ECHO AV AREA/BSA PEAK VELOCITY: 1.2 CM2/M2
ECHO AV AREA/BSA VTI: 1.2 CM2/M2
ECHO AV CUSP MM: 1.9 CM
ECHO AV MEAN GRADIENT: 4 MMHG
ECHO AV MEAN VELOCITY: 0.9 M/S
ECHO AV PEAK GRADIENT: 6 MMHG
ECHO AV PEAK VELOCITY: 1.2 M/S
ECHO AV VELOCITY RATIO: 0.83
ECHO AV VTI: 21.5 CM
ECHO BSA: 2.37 M2
ECHO LA DIAMETER INDEX: 1.63 CM/M2
ECHO LA DIAMETER: 3.8 CM
ECHO LA VOL A-L A2C: 38 ML (ref 18–58)
ECHO LA VOL A-L A4C: 43 ML (ref 18–58)
ECHO LA VOL MOD A2C: 37 ML (ref 18–58)
ECHO LA VOL MOD A4C: 40 ML (ref 18–58)
ECHO LA VOLUME AREA LENGTH: 48 ML
ECHO LA VOLUME INDEX A-L A2C: 16 ML/M2 (ref 16–34)
ECHO LA VOLUME INDEX A-L A4C: 18 ML/M2 (ref 16–34)
ECHO LA VOLUME INDEX AREA LENGTH: 21 ML/M2 (ref 16–34)
ECHO LA VOLUME INDEX MOD A2C: 16 ML/M2 (ref 16–34)
ECHO LA VOLUME INDEX MOD A4C: 17 ML/M2 (ref 16–34)
ECHO LV E' LATERAL VELOCITY: 11 CM/S
ECHO LV E' SEPTAL VELOCITY: 8 CM/S
ECHO LV EF PHYSICIAN: 55 %
ECHO LV FRACTIONAL SHORTENING: 33 % (ref 28–44)
ECHO LV INTERNAL DIMENSION DIASTOLE INDEX: 2.19 CM/M2
ECHO LV INTERNAL DIMENSION DIASTOLIC: 5.1 CM (ref 4.2–5.9)
ECHO LV INTERNAL DIMENSION SYSTOLIC INDEX: 1.46 CM/M2
ECHO LV INTERNAL DIMENSION SYSTOLIC: 3.4 CM
ECHO LV ISOVOLUMETRIC RELAXATION TIME (IVRT): 96.9 MS
ECHO LV IVSD: 1.2 CM (ref 0.6–1)
ECHO LV IVSS: 1.3 CM
ECHO LV MASS 2D: 227.4 G (ref 88–224)
ECHO LV MASS INDEX 2D: 97.6 G/M2 (ref 49–115)
ECHO LV POSTERIOR WALL DIASTOLIC: 1.1 CM (ref 0.6–1)
ECHO LV POSTERIOR WALL SYSTOLIC: 1.3 CM
ECHO LV RELATIVE WALL THICKNESS RATIO: 0.43
ECHO LVOT AREA: 3.5 CM2
ECHO LVOT AV VTI INDEX: 0.75
ECHO LVOT DIAM: 2.1 CM
ECHO LVOT MEAN GRADIENT: 2 MMHG
ECHO LVOT PEAK GRADIENT: 4 MMHG
ECHO LVOT PEAK VELOCITY: 1 M/S
ECHO LVOT STROKE VOLUME INDEX: 24.1 ML/M2
ECHO LVOT SV: 56.1 ML
ECHO LVOT VTI: 16.2 CM
ECHO MV "A" WAVE DURATION: 138.4 MSEC
ECHO MV A VELOCITY: 0.51 M/S
ECHO MV AREA PHT: 3.8 CM2
ECHO MV AREA VTI: 2.7 CM2
ECHO MV E DECELERATION TIME (DT): 269.9 MS
ECHO MV E VELOCITY: 0.71 M/S
ECHO MV E/A RATIO: 1.39
ECHO MV E/E' LATERAL: 6.45
ECHO MV E/E' RATIO (AVERAGED): 7.66
ECHO MV E/E' SEPTAL: 8.88
ECHO MV LVOT VTI INDEX: 1.27
ECHO MV MAX VELOCITY: 0.7 M/S
ECHO MV MEAN GRADIENT: 1 MMHG
ECHO MV MEAN VELOCITY: 0.5 M/S
ECHO MV PEAK GRADIENT: 2 MMHG
ECHO MV PRESSURE HALF TIME (PHT): 58.5 MS
ECHO MV VTI: 20.6 CM
ECHO PV MAX VELOCITY: 1 M/S
ECHO PV MEAN GRADIENT: 2 MMHG
ECHO PV MEAN VELOCITY: 0.6 M/S
ECHO PV PEAK GRADIENT: 4 MMHG
ECHO PV VTI: 15.5 CM
ECHO PVEIN A DURATION: 147.6 MS
ECHO PVEIN A VELOCITY: 0.3 M/S
ECHO PVEIN PEAK D VELOCITY: 0.4 M/S
ECHO PVEIN PEAK S VELOCITY: 0.5 M/S
ECHO PVEIN S/D RATIO: 1.3
ECHO RV INTERNAL DIMENSION: 3.4 CM
ECHO RV TAPSE: 2.1 CM (ref 1.7–?)
EOSINOPHIL # BLD: 0.27 K/UL (ref 0.05–0.5)
EOSINOPHILS RELATIVE PERCENT: 3 % (ref 0–6)
ERYTHROCYTE [DISTWIDTH] IN BLOOD BY AUTOMATED COUNT: 13.8 % (ref 11.5–15)
GFR, ESTIMATED: 81 ML/MIN/1.73M2
GLUCOSE BLD-MCNC: 149 MG/DL
GLUCOSE BLD-MCNC: 149 MG/DL (ref 74–99)
GLUCOSE BLD-MCNC: 162 MG/DL (ref 74–99)
GLUCOSE BLD-MCNC: 164 MG/DL (ref 74–99)
GLUCOSE SERPL-MCNC: 156 MG/DL (ref 74–99)
HCT VFR BLD AUTO: 43.7 % (ref 37–54)
HGB BLD-MCNC: 14.9 G/DL (ref 12.5–16.5)
IMM GRANULOCYTES # BLD AUTO: 0.07 K/UL (ref 0–0.58)
IMM GRANULOCYTES NFR BLD: 1 % (ref 0–5)
LYMPHOCYTES NFR BLD: 2.1 K/UL (ref 1.5–4)
LYMPHOCYTES RELATIVE PERCENT: 21 % (ref 20–42)
MAGNESIUM SERPL-MCNC: 1.7 MG/DL (ref 1.6–2.6)
MCH RBC QN AUTO: 32.2 PG (ref 26–35)
MCHC RBC AUTO-ENTMCNC: 34.1 G/DL (ref 32–34.5)
MCV RBC AUTO: 94.4 FL (ref 80–99.9)
MONOCYTES NFR BLD: 1.09 K/UL (ref 0.1–0.95)
MONOCYTES NFR BLD: 11 % (ref 2–12)
NEUTROPHILS NFR BLD: 65 % (ref 43–80)
NEUTS SEG NFR BLD: 6.65 K/UL (ref 1.8–7.3)
PLATELET # BLD AUTO: 312 K/UL (ref 130–450)
PMV BLD AUTO: 9.3 FL (ref 7–12)
POTASSIUM SERPL-SCNC: 3.5 MMOL/L (ref 3.5–5)
PROT SERPL-MCNC: 6.5 G/DL (ref 6.4–8.3)
RBC # BLD AUTO: 4.63 M/UL (ref 3.8–5.8)
SODIUM SERPL-SCNC: 139 MMOL/L (ref 132–146)
WBC OTHER # BLD: 10.2 K/UL (ref 4.5–11.5)

## 2025-03-11 PROCEDURE — 6370000000 HC RX 637 (ALT 250 FOR IP): Performed by: PHYSICIAN ASSISTANT

## 2025-03-11 PROCEDURE — 6360000004 HC RX CONTRAST MEDICATION: Performed by: EMERGENCY MEDICINE

## 2025-03-11 PROCEDURE — 2500000003 HC RX 250 WO HCPCS: Performed by: STUDENT IN AN ORGANIZED HEALTH CARE EDUCATION/TRAINING PROGRAM

## 2025-03-11 PROCEDURE — 83735 ASSAY OF MAGNESIUM: CPT

## 2025-03-11 PROCEDURE — 80053 COMPREHEN METABOLIC PANEL: CPT

## 2025-03-11 PROCEDURE — 96372 THER/PROPH/DIAG INJ SC/IM: CPT

## 2025-03-11 PROCEDURE — 6370000000 HC RX 637 (ALT 250 FOR IP): Performed by: STUDENT IN AN ORGANIZED HEALTH CARE EDUCATION/TRAINING PROGRAM

## 2025-03-11 PROCEDURE — 82962 GLUCOSE BLOOD TEST: CPT

## 2025-03-11 PROCEDURE — 6370000000 HC RX 637 (ALT 250 FOR IP): Performed by: EMERGENCY MEDICINE

## 2025-03-11 PROCEDURE — 2060000000 HC ICU INTERMEDIATE R&B

## 2025-03-11 PROCEDURE — 6360000002 HC RX W HCPCS: Performed by: STUDENT IN AN ORGANIZED HEALTH CARE EDUCATION/TRAINING PROGRAM

## 2025-03-11 PROCEDURE — 85025 COMPLETE CBC W/AUTO DIFF WBC: CPT

## 2025-03-11 PROCEDURE — 93306 TTE W/DOPPLER COMPLETE: CPT | Performed by: INTERNAL MEDICINE

## 2025-03-11 PROCEDURE — 96374 THER/PROPH/DIAG INJ IV PUSH: CPT

## 2025-03-11 PROCEDURE — 6370000000 HC RX 637 (ALT 250 FOR IP)

## 2025-03-11 PROCEDURE — C8929 TTE W OR WO FOL WCON,DOPPLER: HCPCS

## 2025-03-11 RX ORDER — SODIUM CHLORIDE 9 MG/ML
INJECTION, SOLUTION INTRAVENOUS PRN
Status: DISCONTINUED | OUTPATIENT
Start: 2025-03-11 | End: 2025-03-14 | Stop reason: HOSPADM

## 2025-03-11 RX ORDER — ASPIRIN 81 MG/1
324 TABLET, CHEWABLE ORAL DAILY
Status: DISCONTINUED | OUTPATIENT
Start: 2025-03-11 | End: 2025-03-11 | Stop reason: HOSPADM

## 2025-03-11 RX ORDER — ACETAMINOPHEN 650 MG/1
650 SUPPOSITORY RECTAL EVERY 6 HOURS PRN
Status: DISCONTINUED | OUTPATIENT
Start: 2025-03-11 | End: 2025-03-14 | Stop reason: HOSPADM

## 2025-03-11 RX ORDER — ALLOPURINOL 100 MG/1
300 TABLET ORAL EVERY MORNING
Status: DISCONTINUED | OUTPATIENT
Start: 2025-03-12 | End: 2025-03-14 | Stop reason: HOSPADM

## 2025-03-11 RX ORDER — FINERENONE 10 MG/1
1 TABLET, FILM COATED ORAL EVERY MORNING
COMMUNITY
Start: 2024-09-24 | End: 2025-03-11 | Stop reason: ALTCHOICE

## 2025-03-11 RX ORDER — PANTOPRAZOLE SODIUM 40 MG/1
40 TABLET, DELAYED RELEASE ORAL EVERY MORNING
Status: DISCONTINUED | OUTPATIENT
Start: 2025-03-12 | End: 2025-03-11 | Stop reason: HOSPADM

## 2025-03-11 RX ORDER — PIOGLITAZONE 15 MG/1
15 TABLET ORAL EVERY MORNING
Status: DISCONTINUED | OUTPATIENT
Start: 2025-03-12 | End: 2025-03-11 | Stop reason: HOSPADM

## 2025-03-11 RX ORDER — CLOPIDOGREL BISULFATE 75 MG/1
75 TABLET ORAL DAILY
Status: DISCONTINUED | OUTPATIENT
Start: 2025-03-11 | End: 2025-03-11 | Stop reason: HOSPADM

## 2025-03-11 RX ORDER — INSULIN GLARGINE 100 [IU]/ML
15 INJECTION, SOLUTION SUBCUTANEOUS NIGHTLY
Status: DISCONTINUED | OUTPATIENT
Start: 2025-03-11 | End: 2025-03-14 | Stop reason: HOSPADM

## 2025-03-11 RX ORDER — ALLOPURINOL 300 MG/1
300 TABLET ORAL EVERY MORNING
Status: DISCONTINUED | OUTPATIENT
Start: 2025-03-12 | End: 2025-03-11 | Stop reason: HOSPADM

## 2025-03-11 RX ORDER — ACETAMINOPHEN 325 MG/1
650 TABLET ORAL EVERY 6 HOURS PRN
Status: DISCONTINUED | OUTPATIENT
Start: 2025-03-11 | End: 2025-03-14 | Stop reason: HOSPADM

## 2025-03-11 RX ORDER — GLIPIZIDE 5 MG/1
10 TABLET ORAL
Status: DISCONTINUED | OUTPATIENT
Start: 2025-03-12 | End: 2025-03-11 | Stop reason: HOSPADM

## 2025-03-11 RX ORDER — ALLOPURINOL 300 MG/1
300 TABLET ORAL EVERY MORNING
COMMUNITY
Start: 2024-09-12

## 2025-03-11 RX ORDER — SENNOSIDES A AND B 8.6 MG/1
1 TABLET, FILM COATED ORAL DAILY PRN
Status: DISCONTINUED | OUTPATIENT
Start: 2025-03-11 | End: 2025-03-14 | Stop reason: HOSPADM

## 2025-03-11 RX ORDER — ENOXAPARIN SODIUM 100 MG/ML
30 INJECTION SUBCUTANEOUS 2 TIMES DAILY
Status: DISCONTINUED | OUTPATIENT
Start: 2025-03-11 | End: 2025-03-14 | Stop reason: HOSPADM

## 2025-03-11 RX ORDER — ATORVASTATIN CALCIUM 40 MG/1
40 TABLET, FILM COATED ORAL ONCE
Status: COMPLETED | OUTPATIENT
Start: 2025-03-11 | End: 2025-03-11

## 2025-03-11 RX ORDER — COLCHICINE 0.6 MG/1
0.6 TABLET ORAL PRN
Status: ON HOLD | COMMUNITY
Start: 2024-07-01 | End: 2025-03-13 | Stop reason: HOSPADM

## 2025-03-11 RX ORDER — PIOGLITAZONE 15 MG/1
15 TABLET ORAL EVERY MORNING
Status: DISCONTINUED | OUTPATIENT
Start: 2025-03-12 | End: 2025-03-14 | Stop reason: HOSPADM

## 2025-03-11 RX ORDER — PANTOPRAZOLE SODIUM 40 MG/1
40 TABLET, DELAYED RELEASE ORAL EVERY MORNING
Status: DISCONTINUED | OUTPATIENT
Start: 2025-03-12 | End: 2025-03-14 | Stop reason: HOSPADM

## 2025-03-11 RX ORDER — SODIUM CHLORIDE 0.9 % (FLUSH) 0.9 %
10 SYRINGE (ML) INJECTION PRN
Status: DISCONTINUED | OUTPATIENT
Start: 2025-03-11 | End: 2025-03-14 | Stop reason: HOSPADM

## 2025-03-11 RX ORDER — SODIUM CHLORIDE 0.9 % (FLUSH) 0.9 %
10 SYRINGE (ML) INJECTION EVERY 12 HOURS SCHEDULED
Status: DISCONTINUED | OUTPATIENT
Start: 2025-03-11 | End: 2025-03-14 | Stop reason: HOSPADM

## 2025-03-11 RX ORDER — ONDANSETRON 2 MG/ML
4 INJECTION INTRAMUSCULAR; INTRAVENOUS EVERY 6 HOURS PRN
Status: DISCONTINUED | OUTPATIENT
Start: 2025-03-11 | End: 2025-03-14 | Stop reason: HOSPADM

## 2025-03-11 RX ORDER — TIRZEPATIDE 2.5 MG/.5ML
2.5 INJECTION, SOLUTION SUBCUTANEOUS WEEKLY
COMMUNITY
Start: 2025-03-15

## 2025-03-11 RX ORDER — ONDANSETRON 4 MG/1
4 TABLET, ORALLY DISINTEGRATING ORAL EVERY 8 HOURS PRN
Status: DISCONTINUED | OUTPATIENT
Start: 2025-03-11 | End: 2025-03-14 | Stop reason: HOSPADM

## 2025-03-11 RX ADMIN — SODIUM CHLORIDE, PRESERVATIVE FREE 10 ML: 5 INJECTION INTRAVENOUS at 21:30

## 2025-03-11 RX ADMIN — ASPIRIN 324 MG: 81 TABLET, CHEWABLE ORAL at 07:58

## 2025-03-11 RX ADMIN — ENOXAPARIN SODIUM 30 MG: 100 INJECTION SUBCUTANEOUS at 21:30

## 2025-03-11 RX ADMIN — INSULIN GLARGINE 15 UNITS: 100 INJECTION, SOLUTION SUBCUTANEOUS at 21:30

## 2025-03-11 RX ADMIN — INSULIN GLARGINE 15 UNITS: 100 INJECTION, SOLUTION SUBCUTANEOUS at 00:04

## 2025-03-11 RX ADMIN — SULFUR HEXAFLUORIDE 2 ML: 60.7; .19; .19 INJECTION, POWDER, LYOPHILIZED, FOR SUSPENSION INTRAVENOUS; INTRAVESICAL at 12:30

## 2025-03-11 RX ADMIN — CLOPIDOGREL BISULFATE 75 MG: 75 TABLET ORAL at 07:58

## 2025-03-11 RX ADMIN — ATORVASTATIN CALCIUM 40 MG: 40 TABLET, FILM COATED ORAL at 10:38

## 2025-03-11 ASSESSMENT — PAIN - FUNCTIONAL ASSESSMENT: PAIN_FUNCTIONAL_ASSESSMENT: NONE - DENIES PAIN

## 2025-03-11 ASSESSMENT — LIFESTYLE VARIABLES
HOW MANY STANDARD DRINKS CONTAINING ALCOHOL DO YOU HAVE ON A TYPICAL DAY: 1 OR 2
HOW OFTEN DO YOU HAVE A DRINK CONTAINING ALCOHOL: 2-4 TIMES A MONTH
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
## 2025-03-11 NOTE — ED NOTES
Pt is concerned about results of CT scan \"Mild beaded appearance of the right cervical internal carotid artery   raises the possibility of a mild fibromuscular dysplasia.\" Pt states he would like to speak to the provider downtown when he gets there.     Pt states he has had right sided neck pain x2 months and states he used an electric massager this AM 40 mins prior to the numbness starting.

## 2025-03-11 NOTE — ED NOTES
[facility] here to transport patient to [place] [id number] A. Report given to EMS and appropriate paper work sent

## 2025-03-11 NOTE — ED NOTES
Pt asking questions relating to general health going forward, relates that he is starting a weight loss medication and is going to be making some dietary changes, and healthier lifestyle choices, pts wife at bedside, education provided relating to stroke risks, also addressing diabetes, pt wearing a CGM  and monitoring his blood glucose levels, offered at this time to check glucose, pt declined as is own CGM correlating with his blood tests.

## 2025-03-11 NOTE — PROGRESS NOTES
4 Eyes Skin Assessment     NAME:  Genaro Henao  YOB: 1970  MEDICAL RECORD NUMBER:  [id number]    The patient is being assessed for  Admission    I agree that at least one RN has performed a thorough Head to Toe Skin Assessment on the patient. ALL assessment sites listed below have been assessed.      Areas assessed by both nurses:    Head, Face, Ears, Shoulders, Back, Chest, Arms, Elbows, Hands, Sacrum. Buttock, Coccyx, Ischium, Legs. Feet and Heels, Under Medical Devices , and Other          Does the Patient have a Wound? No noted wound(s)       Harvey Prevention initiated by RN: No  Wound Care Orders initiated by RN: No    Pressure Injury (Stage 3,4, Unstageable, DTI, NWPT, and Complex wounds) if present, place Wound referral order by RN under : No    New Ostomies, if present place, Ostomy referral order under : No     Nurse 1 eSignature: Electronically signed by Salinas Wetzel RN on 3/11/25 at 6:45 PM EDT    **SHARE this note so that the co-signing nurse can place an eSignature**    Nurse 2 eSignature: Electronically signed by Analy Gibbs RN on 3/11/25 at 6:47 PM EDT

## 2025-03-11 NOTE — ED NOTES
Pt extensively googling symptoms and showing this RN , reassurance given and at this time, offered to request anxiolytic medication, pt declines and stated that \" I do not want to stroke in my sleep \" pt reassured that he is on a cardiac monitor with continuous monitoring, call bed within reach, education and discussion on importance of rest, lights dimmed and additional comfort measures offers at this time. PM insulin requested prior to going to sleep, pt reassured that ED would provide and that at this time he is being provided with all ongoing care as he would if he was transferred at this time. Unfortunately no bed available pt verbalized understanding.

## 2025-03-11 NOTE — ED NOTES
Pt requesting to talk to a neurologist via telehealth. Dr Ureña made aware of pts request. Dr Ureña will speak with pt regarding the request.

## 2025-03-11 NOTE — ED NOTES
Pt continues to have a lot of questions regarding his POC and treatment, DO notified and in to speak with pt.

## 2025-03-11 NOTE — ED NOTES
RN has spoken with patient about plan of care and interventions. All questions and concerns addressed at this time. No bed available down town, comfort measures offered at this time and additional discussions relating to symptoms which he feels are resolving although continues to have some left lip decreased sensation.

## 2025-03-12 PROBLEM — G51.0 7TH NERVE PALSY: Status: ACTIVE | Noted: 2025-03-12

## 2025-03-12 PROBLEM — G50.9: Status: ACTIVE | Noted: 2025-03-12

## 2025-03-12 LAB
ALBUMIN SERPL-MCNC: 4.2 G/DL (ref 3.5–5.2)
ALP SERPL-CCNC: 75 U/L (ref 40–129)
ALT SERPL-CCNC: 28 U/L (ref 0–40)
ANION GAP SERPL CALCULATED.3IONS-SCNC: 15 MMOL/L (ref 7–16)
AST SERPL-CCNC: 24 U/L (ref 0–39)
BASOPHILS # BLD: 0.08 K/UL (ref 0–0.2)
BASOPHILS NFR BLD: 1 % (ref 0–2)
BILIRUB SERPL-MCNC: 0.4 MG/DL (ref 0–1.2)
BUN SERPL-MCNC: 16 MG/DL (ref 6–20)
CALCIUM SERPL-MCNC: 9.2 MG/DL (ref 8.6–10.2)
CHLORIDE SERPL-SCNC: 104 MMOL/L (ref 98–107)
CHOLEST SERPL-MCNC: 152 MG/DL
CO2 SERPL-SCNC: 21 MMOL/L (ref 22–29)
CREAT SERPL-MCNC: 1.2 MG/DL (ref 0.7–1.2)
CRP SERPL HS-MCNC: <3 MG/L (ref 0–5)
EOSINOPHIL # BLD: 0.46 K/UL (ref 0.05–0.5)
EOSINOPHILS RELATIVE PERCENT: 4 % (ref 0–6)
ERYTHROCYTE [DISTWIDTH] IN BLOOD BY AUTOMATED COUNT: 13.7 % (ref 11.5–15)
ERYTHROCYTE [SEDIMENTATION RATE] IN BLOOD BY WESTERGREN METHOD: 0 MM/HR (ref 0–15)
GFR, ESTIMATED: 71 ML/MIN/1.73M2
GLUCOSE BLD-MCNC: 142 MG/DL (ref 74–99)
GLUCOSE BLD-MCNC: 179 MG/DL (ref 74–99)
GLUCOSE BLD-MCNC: 286 MG/DL (ref 74–99)
GLUCOSE SERPL-MCNC: 161 MG/DL (ref 74–99)
HBA1C MFR BLD: 7.5 % (ref 4–5.6)
HCT VFR BLD AUTO: 48.6 % (ref 37–54)
HDLC SERPL-MCNC: 39 MG/DL
HGB BLD-MCNC: 16.2 G/DL (ref 12.5–16.5)
IMM GRANULOCYTES # BLD AUTO: 0.13 K/UL (ref 0–0.58)
IMM GRANULOCYTES NFR BLD: 1 % (ref 0–5)
LDLC SERPL CALC-MCNC: 67 MG/DL
LYMPHOCYTES NFR BLD: 2.06 K/UL (ref 1.5–4)
LYMPHOCYTES RELATIVE PERCENT: 19 % (ref 20–42)
MCH RBC QN AUTO: 31.6 PG (ref 26–35)
MCHC RBC AUTO-ENTMCNC: 33.3 G/DL (ref 32–34.5)
MCV RBC AUTO: 94.9 FL (ref 80–99.9)
MONOCYTES NFR BLD: 1.27 K/UL (ref 0.1–0.95)
MONOCYTES NFR BLD: 11 % (ref 2–12)
NEUTROPHILS NFR BLD: 64 % (ref 43–80)
NEUTS SEG NFR BLD: 7.11 K/UL (ref 1.8–7.3)
PLATELET # BLD AUTO: 335 K/UL (ref 130–450)
PMV BLD AUTO: 9.8 FL (ref 7–12)
POTASSIUM SERPL-SCNC: 4.2 MMOL/L (ref 3.5–5)
PROCALCITONIN SERPL-MCNC: 0.05 NG/ML (ref 0–0.08)
PROT SERPL-MCNC: 7.1 G/DL (ref 6.4–8.3)
RBC # BLD AUTO: 5.12 M/UL (ref 3.8–5.8)
SODIUM SERPL-SCNC: 140 MMOL/L (ref 132–146)
TRIGL SERPL-MCNC: 232 MG/DL
VLDLC SERPL CALC-MCNC: 46 MG/DL
WBC OTHER # BLD: 11.1 K/UL (ref 4.5–11.5)

## 2025-03-12 PROCEDURE — 86140 C-REACTIVE PROTEIN: CPT

## 2025-03-12 PROCEDURE — 80061 LIPID PANEL: CPT

## 2025-03-12 PROCEDURE — 6370000000 HC RX 637 (ALT 250 FOR IP): Performed by: STUDENT IN AN ORGANIZED HEALTH CARE EDUCATION/TRAINING PROGRAM

## 2025-03-12 PROCEDURE — 86618 LYME DISEASE ANTIBODY: CPT

## 2025-03-12 PROCEDURE — 2060000000 HC ICU INTERMEDIATE R&B

## 2025-03-12 PROCEDURE — 6370000000 HC RX 637 (ALT 250 FOR IP): Performed by: PHYSICIAN ASSISTANT

## 2025-03-12 PROCEDURE — 86038 ANTINUCLEAR ANTIBODIES: CPT

## 2025-03-12 PROCEDURE — 85652 RBC SED RATE AUTOMATED: CPT

## 2025-03-12 PROCEDURE — 86039 ANTINUCLEAR ANTIBODIES (ANA): CPT

## 2025-03-12 PROCEDURE — 84145 PROCALCITONIN (PCT): CPT

## 2025-03-12 PROCEDURE — 99223 1ST HOSP IP/OBS HIGH 75: CPT | Performed by: PSYCHIATRY & NEUROLOGY

## 2025-03-12 PROCEDURE — 97161 PT EVAL LOW COMPLEX 20 MIN: CPT

## 2025-03-12 PROCEDURE — 6370000000 HC RX 637 (ALT 250 FOR IP)

## 2025-03-12 PROCEDURE — 85025 COMPLETE CBC W/AUTO DIFF WBC: CPT

## 2025-03-12 PROCEDURE — 36415 COLL VENOUS BLD VENIPUNCTURE: CPT

## 2025-03-12 PROCEDURE — 80053 COMPREHEN METABOLIC PANEL: CPT

## 2025-03-12 PROCEDURE — 83036 HEMOGLOBIN GLYCOSYLATED A1C: CPT

## 2025-03-12 PROCEDURE — 97165 OT EVAL LOW COMPLEX 30 MIN: CPT

## 2025-03-12 PROCEDURE — 6360000002 HC RX W HCPCS: Performed by: STUDENT IN AN ORGANIZED HEALTH CARE EDUCATION/TRAINING PROGRAM

## 2025-03-12 PROCEDURE — 2500000003 HC RX 250 WO HCPCS: Performed by: STUDENT IN AN ORGANIZED HEALTH CARE EDUCATION/TRAINING PROGRAM

## 2025-03-12 PROCEDURE — 82962 GLUCOSE BLOOD TEST: CPT

## 2025-03-12 RX ORDER — PREDNISONE 20 MG/1
40 TABLET ORAL DAILY
Status: DISCONTINUED | OUTPATIENT
Start: 2025-03-18 | End: 2025-03-14 | Stop reason: HOSPADM

## 2025-03-12 RX ORDER — PREDNISONE 10 MG/1
10 TABLET ORAL DAILY
Status: DISCONTINUED | OUTPATIENT
Start: 2025-03-22 | End: 2025-03-14 | Stop reason: HOSPADM

## 2025-03-12 RX ORDER — ASPIRIN 81 MG/1
81 TABLET ORAL DAILY
Status: DISCONTINUED | OUTPATIENT
Start: 2025-03-12 | End: 2025-03-14 | Stop reason: HOSPADM

## 2025-03-12 RX ORDER — ALPRAZOLAM 1 MG/1
1 TABLET ORAL 2 TIMES DAILY PRN
Status: DISCONTINUED | OUTPATIENT
Start: 2025-03-12 | End: 2025-03-14 | Stop reason: HOSPADM

## 2025-03-12 RX ORDER — PREDNISONE 10 MG/1
5 TABLET ORAL DAILY
Status: DISCONTINUED | OUTPATIENT
Start: 2025-03-23 | End: 2025-03-14 | Stop reason: HOSPADM

## 2025-03-12 RX ORDER — PREDNISONE 20 MG/1
20 TABLET ORAL DAILY
Status: DISCONTINUED | OUTPATIENT
Start: 2025-03-21 | End: 2025-03-14 | Stop reason: HOSPADM

## 2025-03-12 RX ORDER — GLUCAGON 1 MG/ML
1 KIT INJECTION PRN
Status: DISCONTINUED | OUTPATIENT
Start: 2025-03-12 | End: 2025-03-12 | Stop reason: SDUPTHER

## 2025-03-12 RX ORDER — PREDNISONE 20 MG/1
60 TABLET ORAL DAILY
Status: DISCONTINUED | OUTPATIENT
Start: 2025-03-12 | End: 2025-03-14 | Stop reason: HOSPADM

## 2025-03-12 RX ORDER — DEXTROSE MONOHYDRATE 100 MG/ML
INJECTION, SOLUTION INTRAVENOUS CONTINUOUS PRN
Status: DISCONTINUED | OUTPATIENT
Start: 2025-03-12 | End: 2025-03-14 | Stop reason: HOSPADM

## 2025-03-12 RX ORDER — DEXTROSE MONOHYDRATE 100 MG/ML
INJECTION, SOLUTION INTRAVENOUS CONTINUOUS PRN
Status: DISCONTINUED | OUTPATIENT
Start: 2025-03-12 | End: 2025-03-12 | Stop reason: SDUPTHER

## 2025-03-12 RX ORDER — GLUCAGON 1 MG/ML
1 KIT INJECTION PRN
Status: DISCONTINUED | OUTPATIENT
Start: 2025-03-12 | End: 2025-03-14 | Stop reason: HOSPADM

## 2025-03-12 RX ORDER — PREDNISONE 20 MG/1
20 TABLET ORAL DAILY
Status: DISCONTINUED | OUTPATIENT
Start: 2025-03-20 | End: 2025-03-14 | Stop reason: HOSPADM

## 2025-03-12 RX ORDER — INSULIN LISPRO 100 [IU]/ML
0-8 INJECTION, SOLUTION INTRAVENOUS; SUBCUTANEOUS
Status: DISCONTINUED | OUTPATIENT
Start: 2025-03-12 | End: 2025-03-14 | Stop reason: HOSPADM

## 2025-03-12 RX ADMIN — ALPRAZOLAM 1 MG: 1 TABLET ORAL at 15:21

## 2025-03-12 RX ADMIN — INSULIN GLARGINE 15 UNITS: 100 INJECTION, SOLUTION SUBCUTANEOUS at 20:39

## 2025-03-12 RX ADMIN — SODIUM CHLORIDE, PRESERVATIVE FREE 10 ML: 5 INJECTION INTRAVENOUS at 08:23

## 2025-03-12 RX ADMIN — ACETAMINOPHEN 650 MG: 325 TABLET ORAL at 08:19

## 2025-03-12 RX ADMIN — ENOXAPARIN SODIUM 30 MG: 100 INJECTION SUBCUTANEOUS at 20:39

## 2025-03-12 RX ADMIN — PREDNISONE 60 MG: 20 TABLET ORAL at 12:20

## 2025-03-12 RX ADMIN — INSULIN LISPRO 4 UNITS: 100 INJECTION, SOLUTION INTRAVENOUS; SUBCUTANEOUS at 20:39

## 2025-03-12 RX ADMIN — SODIUM CHLORIDE, PRESERVATIVE FREE 10 ML: 5 INJECTION INTRAVENOUS at 20:38

## 2025-03-12 RX ADMIN — PANTOPRAZOLE SODIUM 40 MG: 40 TABLET, DELAYED RELEASE ORAL at 08:21

## 2025-03-12 RX ADMIN — ENOXAPARIN SODIUM 30 MG: 100 INJECTION SUBCUTANEOUS at 08:21

## 2025-03-12 RX ADMIN — PIOGLITAZONE 15 MG: 15 TABLET ORAL at 08:22

## 2025-03-12 RX ADMIN — ALLOPURINOL 300 MG: 100 TABLET ORAL at 08:20

## 2025-03-12 RX ADMIN — ASPIRIN 81 MG: 81 TABLET, COATED ORAL at 10:15

## 2025-03-12 ASSESSMENT — PAIN DESCRIPTION - PAIN TYPE: TYPE: ACUTE PAIN

## 2025-03-12 ASSESSMENT — PAIN SCALES - GENERAL
PAINLEVEL_OUTOF10: 0
PAINLEVEL_OUTOF10: 0
PAINLEVEL_OUTOF10: 2
PAINLEVEL_OUTOF10: 0
PAINLEVEL_OUTOF10: 2

## 2025-03-12 ASSESSMENT — PAIN DESCRIPTION - FREQUENCY: FREQUENCY: INTERMITTENT

## 2025-03-12 ASSESSMENT — PAIN DESCRIPTION - LOCATION: LOCATION: HEAD;NECK

## 2025-03-12 ASSESSMENT — PAIN DESCRIPTION - ORIENTATION: ORIENTATION: RIGHT;LEFT

## 2025-03-12 ASSESSMENT — PAIN DESCRIPTION - DESCRIPTORS: DESCRIPTORS: ACHING;DISCOMFORT;DULL

## 2025-03-12 ASSESSMENT — PAIN - FUNCTIONAL ASSESSMENT: PAIN_FUNCTIONAL_ASSESSMENT: PREVENTS OR INTERFERES SOME ACTIVE ACTIVITIES AND ADLS

## 2025-03-12 NOTE — PROGRESS NOTES
Physical Therapy  Initial Assessment     Name: Genaro Henao  : 1970  MRN: [id number]      Date of Service: 3/12/2025    Evaluating PT: Denisa Pugh PT, DPT [id number]      Room #:  8503/8503-A  Diagnosis:  CVA (cerebral vascular accident) ([facility]) [I63.9]  Acute cerebrovascular accident (CVA) ([facility]) [I63.9]  PMHx/PSHx:    Past Medical History:   Diagnosis Date    Acid reflux     ADHD     Anxiety     DM (diabetes mellitus) ([facility])     Left foot drop 2/3/2022    Left leg pain 2/3/2022    Left lumbar radiculitis 2/3/2022    Low back pain     Protein in urine     Dr Zamora      Procedure/Surgery:  none this admission  Precautions:  None   Equipment Needs:  none    SUBJECTIVE:    Pt lives wife in a 2 story home with 3 stair(s) and no rail(s) to enter. Bed/bath on 2nd floor with a full flight of stairs with railing. Pt ambulated with no AD prior to admission.    OBJECTIVE:   Initial Evaluation  Date: 3/12/25   AM-PAC 6 Clicks    Was pt agreeable to Eval/treatment? Yes    Does pt have pain? Denies pain  Previously had headache pain   Bed Mobility  Rolling: Independent  Supine to sit: Independent  Sit to supine: Independent  Scooting: Independent   Transfers Sit to stand: Independent   Stand to sit: Independent   Stand pivot: Independent    Ambulation   150 feet with no AD Independent   Stair negotiation: ascended and descended 12 step(s) with 1 rail(s) Modified Independent   ROM BUE: WFL  BLE: WFL   Strength BUE: WFL  BLE: 5/5    Balance Sitting EOB: Independent   Dynamic Standing: Independent      Pt is A & O x: 4 to person, place, month/year, and situation.  Sensation: numbness L side of face, L hand, L foot   Edema: unremarkable     Patient education  Pt educated on PT role and safety with all mobility.     Patient response to education:   Pt verbalized understanding Pt demonstrated skill Pt requires further education in this area   Yes  yes no     ASSESSMENT:    Comments:    Pt was supine upon room entry,  agreeable to PT evaluation. Pt completed all mobility independently. Pt reports they are at functional baseline and that there are no skilled PT needs at this time. Pt does report visual deficits (R eyelid lagging, difficulty focusing), but not impact on mobility or safety noted. Pt educated to have PT services re-consulted if there is a change in mobility status. Pt was left supine, all needs in place.      PHYSICAL THERAPY PLAN OF CARE:    PT POC is established based on physician order and patient diagnosis     Based on pt's current level of independence for all functional mobility, this pt is not a candidate for continued skilled PT services. Pt is agreeable that there are no skilled PT needs at this time. Will complete order and remove pt from PT caseload. Please re-consult if pt experiences functional decline. Thank you.    Referring provider/PT Order:    03/11/25 1815  PT evaluation and treat  Start:  03/11/25 1815,   End:  03/11/25 1815,   ONE TIME,   Standing Count:  1 Occurrences,   R         Stepan Coley MD     Diagnosis:  CVA (cerebral vascular accident) (HCC) [I63.9]  Acute cerebrovascular accident (CVA) (HCC) [I63.9]    Time in  1012  Time out  1035    Total Treatment Time  0 minutes     Evaluation Time includes thorough review of current medical information, gathering information on past medical history/social history and prior level of function, completion of standardized testing/informal observation of tasks, assessment of data and education on plan of care and goals.    CPT codes:  [x] Low Complexity PT evaluation 04762  [] Moderate Complexity PT evaluation 19738  [] High Complexity PT evaluation 92662  [] PT Re-evaluation 46355  [] Gait training 56533 0 minutes  [] Manual therapy 31095 0 minutes  [] Therapeutic activities 88693 0 minutes  [] Therapeutic exercises 11683 0 minutes  [] Neuromuscular reeducation 99583 0 minutes     Denisa Pugh, PT, DPT  [id number]

## 2025-03-12 NOTE — PLAN OF CARE
Problem: Chronic Conditions and Co-morbidities  Goal: Patient's chronic conditions and co-morbidity symptoms are monitored and maintained or improved  Outcome: Progressing     Problem: Discharge Planning  Goal: Discharge to home or other facility with appropriate resources  Outcome: Progressing  Flowsheets (Taken 3/11/2025 2512 by Salinas Wetzel, RN)  Discharge to home or other facility with appropriate resources:   Identify barriers to discharge with patient and caregiver   Arrange for needed discharge resources and transportation as appropriate     Problem: Safety - Adult  Goal: Free from fall injury  Outcome: Progressing

## 2025-03-12 NOTE — PROGRESS NOTES
OCCUPATIONAL THERAPY INITIAL EVALUATION AND DISCHARGE  [facility] [address] [place], [place]       Date:3/12/2025                                                  Patient Name: Genaro Henao  MRN: [id number]  : 1970  Room: [address] [place] [address]    Evaluating OT: Tim Rae OTR/L [id number]    Referring Provider: Stepan Coley MD      Specific Provider Orders/Date: OT evaluation and treatment 3/11/25 1815    Diagnosis:  CVA (cerebral vascular accident) ([facility]) [I63.9]  Acute cerebrovascular accident (CVA) ([facility]) [I63.9]      Pertinent Medical History:  has a past medical history of Acid reflux, ADHD, Anxiety, DM (diabetes mellitus) ([facility]), Left foot drop, Left leg pain, Left lumbar radiculitis, Low back pain, and Protein in urine.     Pt presented to the hospital on 3/10 with L sided numbness      Orders received, chart reviewed, eval complete.     Precautions: none      Assessment of current deficits: N/A    OT PLAN OF CARE   OT POC based on physician orders, patient diagnosis and results of clinical assessment    Frequency/Duration: N/A   Specific OT Treatment to include: N/A    Modified Phil Scale   Score     Description  0             No symptoms  1             No significant disability despite symptoms  2             Slight disability; able to look after own affairs  3             Moderate disability; able to ambulate without assist/ requires assist with ADLs  4             Moderate/Severe disability;requires assist to ambulate/assist with ADLs  5             Severe disability;bedridden/incontinent   6               Score:   1    Recommended Adaptive Equipment: no needs    Home Living:  Pt lives with spouse   in a 2 story house with 3 steps to enter  and and no hand rails    Bedroom setup: 2nd floor  standard bed   Bathroom setup: 2nd floor  walk in shower   Equipment owned:   none    Prior Level of Function:  Pt I with ADLs , I with  IADLs, and completed functional mobility with no AD   Falls: no   Driving: yes   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     Pain Level: did not rate pain when questioned  and tolerable for treatment  R headache reports resolved with tylenol this AM   OT provided: Pain medication received prior to OT session     Cognition: A&O: 4/4;   Follows multi step directions: good    Memory:  good    Sequencing:  good    Problem solving:  good    Judgement/safety:  good     Functional Assessment:   AM-PAC Inpatient Daily Activity Raw Score: 24 /24     Initial Eval Status  Date: 3/12/2025     Feeding Independent   Grooming Independent   UB Dressing Independent   LB Dressing Independent   Bathing Independent   Toileting Independent   Bed Mobility  Rolling L/R: Independent  Supine to sit: Independent  Sit to supine: Independent   Functional Transfers Sit to stand: Independent  Stand to sit: Independent  Stand pivot: Independent   Functional Mobility Independent    Balance Sitting: Independent     Standing: Independent   Activity Tolerance Good    Visual/  Perceptual wears glasses  Pt report his R eye is having trouble focusing - and noted R eye lag in blink     Pursuits and saccades WFL   Visual fields WFL   Pt able to read the clock and Ot's name eber FLEMING  ROM/Strength/  Fine motor Coordination Hand dominance: L    RUE: ROM WFL      Strength: grossly 5/5      Strength: WFL      Coordination:  WFL     LUE: ROM WFL      Strength: grossly 5/5      Strength: WFL      Coordination: WFL      Hearing: WFL   Sensation:  reports decreased sensation in L side of face, LUE, and LLE   Tone: WFL   Edema:  None noted    Comments:     RN cleared patient for OT.  Upon arrival, patient was  and agreeable to OT session. no visitors present throughout session.  OT edu Pt on the role of OT in the acute care setting. Pt verbalized understanding.   Pt demonstrated independence with functional mobility and  ADLs. Pt with no skilled OT needs at this time. Pt verbalized understanding and agreement with OT discharge. OT will sign off at this time. If status changes please re-consult OT.      Patient / Family Goal: to go home      Patient and/or family were instructed on functional diagnosis, prognosis/goals and OT plan of care. Pt/family demonstrated understanding.       Eval Complexity:      Description  Performance deficits  Clinical decision making  Co-morbidities affecting occupational performance  Modification or assistance to complete eval    Low Complexity   1 to 3 [x]  Low []  None []  None [x]   Moderate Complexity   3 to 5 []  Mod [x]  Maybe [x]  Min to Mod []   High Complexity   5 or more []  High []  Yes []  Max []     The above evaluation is classified as low complexity based off the noted performance deficits, personal factors, co-morbidities, assistance required, and other factors as noted in the clinical evaluation and functional testing.     Time In: 1012  Time Out: 1024  Total Treatment Time: 0 minutes    Min Units   OT Eval Low 97165  x 1   OT Eval Medium 43069      OT Eval High 74235       OT Re-Eval 26179       Therapeutic Ex 56604       Therapeutic Activities 41910      ADL/Self Care 88539      Orthotic Management 02041       Neuro Re-Ed 65251       Non-Billable Time          Evaluation Time includes thorough review of current medical information, gathering information on past medical history/social history and prior level of function, completion of standardized testing/informal observation of tasks, assessment of data and education on plan of care and goals.          Tim Rae OTR/L [id number]

## 2025-03-12 NOTE — CONSULTS
NEUROLOGY CONSULT NOTE      Patient:  Genaro Henao 54 y.o. male MRN: [id number]     Date of Service: 3/12/2025    Hospital Day: 2      Chief complaint: left sided facial numbness, left lip twitching, left sided numbness in left upper extremity and lower extremity.    Neurology was consulted for: CVA evaluation.     History of Present Illness   Genaro Henao 54 y.o. male with past medical history of type 2 diabetes mellitus, Chronic kidney diseases stage II, GERD, ADHD, Anxiety, lumbar radiculitis, presented to the [facility] ED initially with stroke like symptoms, which described as left sided lip twitching, started at 8:30 am in the morning, prior to that, he was using neck massager for neck pain around 7 am, he decided to come to hospital, later on, his symptoms progressed to left side facial numbness, left upper extremity and left lower leg numbness. In [facility] ED, NIH score was 3, stroke alert was called, Telestroke was consulted, patient was offered thrombolytics, patient elected not to use thrombolytics, Imaging was done, including CT head, was unremarkable, CTA head and neck showed mild beaded apperance of right ICA, and MRI brain wo contrast was unremarkable. He was given Aspirin 324 mg once. He was then transferred to [facility] for further evaluation.     Past Medical History:      Diagnosis Date    Acid reflux     ADHD     Anxiety     DM (diabetes mellitus) (HCC)     Left foot drop 2/3/2022    Left leg pain 2/3/2022    Left lumbar radiculitis 2/3/2022    Low back pain     Protein in urine     Dr Zamora     Past Surgical History:        Procedure Laterality Date    ENDOSCOPY, COLON, DIAGNOSTIC      LAMINECTOMY Left 5/27/2022    LEFT L4-L5 HEMILAMINECTOMY AND DISCECTOMY performed by Manan Murillo MD at [facility] OR    NERVE BLOCK Left 6/8/2021    LEFT LUMBAR MEDIAL BRANCH NERVE BLOCK UNDER FLUOROSCOPIC GUIDANCE AT L2, L3, L4 AND  side, unable to whistle blow  Neck: supple, symmetrical, trachea midline   Lungs: respirations non labored   Extremities: normal, atraumatic, no cyanosis or edema  Skin: color, texture, turgor normal---no rashes or lesions    Mental Status: alert, oriented, thought content appropriate    Appropriate attention/concentration  Intact fundus of knowledge  Repetition intact  Memories intact    Speech: no dysarthria  Language: no aphasias    Cranial Nerves:  I: smell intact   II: visual acuity  Normal   II: visual fields Full    II: pupils JEREMIAH   III,VII: ptosis None   III,IV,VI: extraocular muscles  EOMI without nystagmus    V: mastication Normal   V: facial light touch sensation  Numbness on left side   V,VII: corneal reflex  Present   VII: facial muscle function - upper  Asymmetric on right side   VII: facial muscle function - lower Asymmetric on right side.   VIII: hearing Normal   IX: soft palate elevation  Normal   IX,X: gag reflex Present   XI: trapezius strength  5/5   XI: sternocleidomastoid strength 5/5   XI: neck extension strength  5/5   XII: tongue strength  Normal   Sensory changes in tongue: more salty sensation on left  Motor:  Right   5/5              Left   5/5               Right Bicep  5/5           Left Bicep  5/5              Right Triceps   5/5       Left Triceps  5/5          Right Deltoid  5/5     Left Deltoid  5/5         Right IPS  5/5            Left IPS  5/5               Right Quadriceps  5/5          Left Quadriceps    5/5           Right Gastrocnemius    5/5    Left Gastrocnemius   5/5  Right Ant Tibialis   5/5  Left Ant Tibialis   5/5   5/5 throughout  Normal bulk and tone   No drift  No abnormal movements    Sensory:  LT and PP normal  Vibration normal     Coordination:   FN, FFM and CONNOR normal  HS normal    Gait:  normal    DTR:   Right Brachioradialis reflex 1+  Left Brachioradialis reflex 1+  Right Biceps reflex 1+  Left Biceps reflex 1+  Right Triceps reflex 1+  Left Triceps

## 2025-03-12 NOTE — H&P
Internal Medicine History & Physical     Name: Genaro Henao  : 1970  Chief Complaint: No chief complaint on file.  Primary Care Physician: Christophe Edwards MD  Admission date: 3/11/2025  Date of service: 3/12/2025     History of Present Illness  Genaro is a 54 y.o. year old male who presented with a chief complaint of stroke like symptoms      Patient started about a week ago with R neck pain and started using a massager which afterwards he started having L sided facial numbness. He has had facial weakness since. He states he is also having trouble blinking on the R side. This is also involving tingling of the L side of his face. Dr Lewis in room during evaluation. When he was presenting to [facility] he was offered tnkase by tele neurology and was offered tnkase which he declined.         Past Medical History:   Diagnosis Date    Acid reflux     ADHD     Anxiety     DM (diabetes mellitus) ([facility])     Left foot drop 2/3/2022    Left leg pain 2/3/2022    Left lumbar radiculitis 2/3/2022    Low back pain     Protein in urine     Dr Zamora       Past Surgical History:   Procedure Laterality Date    ENDOSCOPY, COLON, DIAGNOSTIC      LAMINECTOMY Left 2022    LEFT L4-L5 HEMILAMINECTOMY AND DISCECTOMY performed by Manan Murillo MD at [facility] OR    NERVE BLOCK Left 2021    LEFT LUMBAR MEDIAL BRANCH NERVE BLOCK UNDER FLUOROSCOPIC GUIDANCE AT L2, L3, L4 AND L5 DORSAL RAMI WITH SEDATION (CPT 52886) performed by Rajat Oleary MD at [facility] OR    NERVE BLOCK Left 2021    LEFT S1, S2, S3 & L5 DORSAL RAMUS  BLOCK UNDER FLUOROSCOPY performed by Rajat Oleary MD at [facility] OR    PAIN MANAGEMENT PROCEDURE Left 2021    LUMBAR EPIDURAL STEROID INJECTION UNDER FLUOROSCOPIC GUIDANCE AT L4-L5 LEFT PARAMEDIAN LEFT SACROILIAC JOINT INJECTION performed by Rajat Oleary MD at [facility] OR    PAIN MANAGEMENT PROCEDURE Left 2021    RADIOFREQUENCY ABLATION OF LEFT LUMBAR L4, L5, S1 UNDER FLUOROSCOPY performed  03/12/2025    CREATININE 1.2 03/12/2025    BUN 16 03/12/2025    CO2 21 (L) 03/12/2025    GLUCOSE 161 (H) 03/12/2025    ALT 28 03/12/2025    AST 24 03/12/2025    INR 1.0 05/24/2022     No results found for: \"CKTOTAL\", \"CKMB\", \"CKMBINDEX\", \"TROPONINI\"    Recent Radiological Studies:  No orders to display       Assessment  Active Hospital Problems    Diagnosis     Acute cerebrovascular accident (CVA) ([facility]) [I63.9]        Patient Active Problem List    Diagnosis Date Noted    Lumbar herniated disc 05/27/2022     Priority: Medium    Acute cerebrovascular accident (CVA) ([facility]) 03/11/2025    Lumbar radicular pain 02/03/2022    Left leg pain 02/03/2022    Left foot drop 02/03/2022    Sacroiliac dysfunction 06/24/2021    Lumbosacral spondylosis without myelopathy 05/28/2021    DDD (degenerative disc disease), lumbar 05/28/2021    Type 2 diabetes mellitus without complication ([facility]) 04/24/2020    Multiple joint pain 09/04/2018    Neck pain on left side 09/04/2018    C7 radiculopathy 08/14/2018    Mood disorder 08/14/2018    Pain and swelling of left elbow 08/14/2018    Pain in left forearm 08/14/2018       Plan  Stroke like symptoms   Left-sided facial and extremities numbness, slight slurred speech  Presented to the ED 3/10/25 declined TNKase due to his more than 20 year ago history of ICH   Neurology consultation   CTA negative for LVO   MRI brain negative for acute stroke   ECHO non diagnostic for shunt   Neurology consultation discussed with Dr Lewis     Discharge plan: TBD pending clinical improvement     Stepan Coley MD  Internal medicine   3/12/2025  8:47 AM    I can be reached through [employer].    NOTE:  This report was transcribed using voice recognition software.  Every effort was made to ensure accuracy; however, inadvertent computerized transcription errors may be present.

## 2025-03-12 NOTE — PLAN OF CARE
Problem: Chronic Conditions and Co-morbidities  Goal: Patient's chronic conditions and co-morbidity symptoms are monitored and maintained or improved  3/12/2025 1532 by Salinas Wetzel RN  Outcome: Progressing     Problem: Chronic Conditions and Co-morbidities  Goal: Patient's chronic conditions and co-morbidity symptoms are monitored and maintained or improved  3/12/2025 1528 by Salinas Wetzel RN  Outcome: Progressing     Problem: Discharge Planning  Goal: Discharge to home or other facility with appropriate resources  3/12/2025 1532 by Salinas Wetzel RN  Outcome: Progressing     Problem: Discharge Planning  Goal: Discharge to home or other facility with appropriate resources  3/12/2025 1528 by Salinas Wetzel RN  Outcome: Progressing     Problem: Safety - Adult  Goal: Free from fall injury  3/12/2025 1532 by Salinas Wetzel RN  Outcome: Progressing     Problem: Safety - Adult  Goal: Free from fall injury  3/12/2025 1528 by Salinas Wetzel RN  Outcome: Progressing     Problem: Pain  Goal: Verbalizes/displays adequate comfort level or baseline comfort level  3/12/2025 1532 by Salinas Wetzel RN  Outcome: Progressing     Problem: Pain  Goal: Verbalizes/displays adequate comfort level or baseline comfort level  3/12/2025 1528 by Salinas Wetzel RN  Outcome: Progressing

## 2025-03-12 NOTE — CARE COORDINATION
Pt live with wife and both are working. They couple has 4 children with none local, 3 in [place] and one in [place]. Pt is not a . His pcp is Dr. Saenz whom he saw about 2 weeks ago.  Pt said he travels a lot and is independent with all adl's. No hhc pta. He has old dme from back: fww, and canes and raised toilet seat. . His plan is home to a 2 story setting with bed and bath on the 2nd floor and a 1/2  bath on the first and 3 garage steps to enter. Wife to take pt home. PT and OT to eval.  No needs anticipated. Neurology to see. MRI of the brain was negative. Pt c/o facial numbness. MALINI Joel  .3/12/2025      Case Management Assessment  Initial Evaluation    Date/Time of Evaluation: 3/12/2025 10:00 AM  Assessment Completed by: MALINI Joel    If patient is discharged prior to next notation, then this note serves as note for discharge by case management.    Patient Name: Genaro Henao                   YOB: 1970  Diagnosis: CVA (cerebral vascular accident) ([facility]) [I63.9]  Acute cerebrovascular accident (CVA) ([facility]) [I63.9]                   Date / Time: 3/11/2025  5:30 PM    Patient Admission Status: Inpatient   Readmission Risk (Low < 19, Mod (19-27), High > 27): Readmission Risk Score: 8.4    Current PCP: Christophe Edwards MD  PCP verified by CM? Yes    Chart Reviewed: Yes      History Provided by: Patient  Patient Orientation: Alert and Oriented    Patient Cognition: Alert    Hospitalization in the last 30 days (Readmission):  No    If yes, Readmission Assessment in CM Navigator will be completed.    Advance Directives:      Code Status: Full Code   Patient's Primary Decision Maker is: Legal Next of Kin (need copy from wife.)    Primary Decision Maker: Dominique Torres - Spouse - [phone]    Discharge Planning:    Patient lives with: Spouse/Significant Other Type of Home: House  Primary Care Giver: Self  Patient Support Systems include: Spouse/Significant Other, Children, Family Members    Current Financial resources:    Current community resources:    Current services prior to admission: None            Current DME:              Type of Home Care services:  None    ADLS  Prior functional level: Independent in ADLs/IADLs  Current functional level: Independent in ADLs/IADLs    PT AM-PAC:   /24  OT AM-PAC:   /24    Family can provide assistance at DC: Yes  Would you like Case Management to discuss the discharge plan with any other family members/significant others, and if so, who? Yes  Plans to Return to Present Housing: Yes  Other Identified Issues/Barriers to RETURNING to current housing: therapy to eval.   Potential Assistance needed at discharge: N/A            Potential DME:    Patient expects to discharge to: House  Plan for transportation at discharge:      Financial    Payor: [employer] / Plan: [employer] - CHOICE PLUS / Product Type: *No Product type* /     Does insurance require precert for SNF:  none     Potential assistance Purchasing Medications: No  Meds-to-Beds request: Yes      [facility] [address] [place] [address] [employer] [facility] [phone] -  [phone]  [address] [place] [address]  Phone: [phone] Fax: [phone]      Notes:    Factors facilitating achievement of predicted outcomes: Family support, Motivated, Cooperative, Pleasant, Sense of humor, Good insight into deficits, and Has needed Durable Medical Equipment at home    Barriers to discharge:  none     Additional Case Management Notes: see above     The Plan for Transition of Care is related to the following treatment goals of CVA (cerebral vascular accident) (HCC) [I63.9]  Acute cerebrovascular accident (CVA) (HCC) [I63.9]    IF APPLICABLE: The Patient and/or patient representative Genaro and his family were provided with a choice of provider and agrees with the discharge plan. Freedom of choice list with basic dialogue that supports the patient's individualized plan of care/goals and shares  the quality data associated with the providers was provided to:     Patient Representative Name:       The Patient and/or Patient Representative Agree with the Discharge Plan?    MALINI Joel  Case Management Department  Ph: [phone]  Fax: [phone]

## 2025-03-12 NOTE — ACP (ADVANCE CARE PLANNING)
Advance Care Planning   Healthcare Decision Maker:    Primary Decision Maker: Dominique Torres [facility] - [phone]    Click here to complete Healthcare Decision Makers including selection of the Healthcare Decision Maker Relationship (ie \"Primary\").          need copy of documents. .MALINI Joel  3/12/2025   Severely Impaired: absence of useful hearing/no hearing aid worn

## 2025-03-13 ENCOUNTER — APPOINTMENT (OUTPATIENT)
Dept: MRI IMAGING | Age: 55
End: 2025-03-13
Attending: STUDENT IN AN ORGANIZED HEALTH CARE EDUCATION/TRAINING PROGRAM
Payer: COMMERCIAL

## 2025-03-13 PROBLEM — R20.0 LT FACIAL NUMBNESS: Status: ACTIVE | Noted: 2025-03-13

## 2025-03-13 PROBLEM — R29.810 FACIAL DROOP: Status: ACTIVE | Noted: 2025-03-13

## 2025-03-13 LAB
ALBUMIN SERPL-MCNC: 4.4 G/DL (ref 3.5–5.2)
ALP SERPL-CCNC: 82 U/L (ref 40–129)
ALT SERPL-CCNC: 27 U/L (ref 0–40)
ANA SER QL IA: NEGATIVE
ANION GAP SERPL CALCULATED.3IONS-SCNC: 19 MMOL/L (ref 7–16)
AST SERPL-CCNC: 23 U/L (ref 0–39)
BASOPHILS # BLD: 0 K/UL (ref 0–0.2)
BASOPHILS NFR BLD: 0 % (ref 0–2)
BILIRUB SERPL-MCNC: 0.4 MG/DL (ref 0–1.2)
BUN SERPL-MCNC: 17 MG/DL (ref 6–20)
CALCIUM SERPL-MCNC: 10 MG/DL (ref 8.6–10.2)
CHLORIDE SERPL-SCNC: 100 MMOL/L (ref 98–107)
CO2 SERPL-SCNC: 17 MMOL/L (ref 22–29)
CREAT SERPL-MCNC: 1.1 MG/DL (ref 0.7–1.2)
EOSINOPHIL # BLD: 0 K/UL (ref 0.05–0.5)
EOSINOPHILS RELATIVE PERCENT: 0 % (ref 0–6)
ERYTHROCYTE [DISTWIDTH] IN BLOOD BY AUTOMATED COUNT: 13.2 % (ref 11.5–15)
GFR, ESTIMATED: 77 ML/MIN/1.73M2
GLUCOSE BLD-MCNC: 176 MG/DL (ref 74–99)
GLUCOSE BLD-MCNC: 258 MG/DL (ref 74–99)
GLUCOSE BLD-MCNC: 310 MG/DL (ref 74–99)
GLUCOSE BLD-MCNC: 348 MG/DL (ref 74–99)
GLUCOSE SERPL-MCNC: 205 MG/DL (ref 74–99)
HCT VFR BLD AUTO: 50 % (ref 37–54)
HGB BLD-MCNC: 16.7 G/DL (ref 12.5–16.5)
LYMPHOCYTES NFR BLD: 2.12 K/UL (ref 1.5–4)
LYMPHOCYTES RELATIVE PERCENT: 9 % (ref 20–42)
MCH RBC QN AUTO: 31.3 PG (ref 26–35)
MCHC RBC AUTO-ENTMCNC: 33.4 G/DL (ref 32–34.5)
MCV RBC AUTO: 93.8 FL (ref 80–99.9)
MONOCYTES NFR BLD: 1.49 K/UL (ref 0.1–0.95)
MONOCYTES NFR BLD: 6 % (ref 2–12)
NEUTROPHILS NFR BLD: 85 % (ref 43–80)
NEUTS SEG NFR BLD: 20.59 K/UL (ref 1.8–7.3)
PLATELET # BLD AUTO: 380 K/UL (ref 130–450)
PMV BLD AUTO: 9.7 FL (ref 7–12)
POTASSIUM SERPL-SCNC: 5.1 MMOL/L (ref 3.5–5)
PROT SERPL-MCNC: 8 G/DL (ref 6.4–8.3)
RBC # BLD AUTO: 5.33 M/UL (ref 3.8–5.8)
RBC # BLD: ABNORMAL 10*6/UL
SODIUM SERPL-SCNC: 136 MMOL/L (ref 132–146)
WBC OTHER # BLD: 24.2 K/UL (ref 4.5–11.5)

## 2025-03-13 PROCEDURE — 6360000004 HC RX CONTRAST MEDICATION: Performed by: RADIOLOGY

## 2025-03-13 PROCEDURE — 99232 SBSQ HOSP IP/OBS MODERATE 35: CPT | Performed by: NURSE PRACTITIONER

## 2025-03-13 PROCEDURE — 70546 MR ANGIOGRAPH HEAD W/O&W/DYE: CPT

## 2025-03-13 PROCEDURE — 85025 COMPLETE CBC W/AUTO DIFF WBC: CPT

## 2025-03-13 PROCEDURE — 6370000000 HC RX 637 (ALT 250 FOR IP): Performed by: STUDENT IN AN ORGANIZED HEALTH CARE EDUCATION/TRAINING PROGRAM

## 2025-03-13 PROCEDURE — 2060000000 HC ICU INTERMEDIATE R&B

## 2025-03-13 PROCEDURE — 6370000000 HC RX 637 (ALT 250 FOR IP): Performed by: PHYSICIAN ASSISTANT

## 2025-03-13 PROCEDURE — 6370000000 HC RX 637 (ALT 250 FOR IP)

## 2025-03-13 PROCEDURE — 82962 GLUCOSE BLOOD TEST: CPT

## 2025-03-13 PROCEDURE — 80053 COMPREHEN METABOLIC PANEL: CPT

## 2025-03-13 PROCEDURE — 36415 COLL VENOUS BLD VENIPUNCTURE: CPT

## 2025-03-13 PROCEDURE — 70552 MRI BRAIN STEM W/DYE: CPT

## 2025-03-13 PROCEDURE — 82164 ANGIOTENSIN I ENZYME TEST: CPT

## 2025-03-13 PROCEDURE — A9579 GAD-BASE MR CONTRAST NOS,1ML: HCPCS | Performed by: RADIOLOGY

## 2025-03-13 PROCEDURE — 6360000002 HC RX W HCPCS: Performed by: STUDENT IN AN ORGANIZED HEALTH CARE EDUCATION/TRAINING PROGRAM

## 2025-03-13 PROCEDURE — 70553 MRI BRAIN STEM W/O & W/DYE: CPT

## 2025-03-13 PROCEDURE — 2500000003 HC RX 250 WO HCPCS: Performed by: STUDENT IN AN ORGANIZED HEALTH CARE EDUCATION/TRAINING PROGRAM

## 2025-03-13 RX ORDER — INSULIN LISPRO 100 [IU]/ML
5 INJECTION, SOLUTION INTRAVENOUS; SUBCUTANEOUS
Status: DISCONTINUED | OUTPATIENT
Start: 2025-03-13 | End: 2025-03-14 | Stop reason: HOSPADM

## 2025-03-13 RX ORDER — PREDNISONE 5 MG/1
TABLET ORAL
Qty: 71 TABLET | Refills: 0 | Status: SHIPPED | OUTPATIENT
Start: 2025-03-14 | End: 2025-03-14

## 2025-03-13 RX ORDER — ASPIRIN 81 MG/1
81 TABLET ORAL DAILY
Qty: 30 TABLET | Refills: 3 | Status: SHIPPED | OUTPATIENT
Start: 2025-03-14 | End: 2025-03-14

## 2025-03-13 RX ORDER — INSULIN GLARGINE 100 [IU]/ML
5 INJECTION, SOLUTION SUBCUTANEOUS ONCE
Status: COMPLETED | OUTPATIENT
Start: 2025-03-13 | End: 2025-03-13

## 2025-03-13 RX ORDER — GLIPIZIDE 5 MG/1
10 TABLET ORAL
Status: DISCONTINUED | OUTPATIENT
Start: 2025-03-14 | End: 2025-03-14 | Stop reason: HOSPADM

## 2025-03-13 RX ADMIN — ALPRAZOLAM 1 MG: 1 TABLET ORAL at 20:15

## 2025-03-13 RX ADMIN — INSULIN LISPRO 4 UNITS: 100 INJECTION, SOLUTION INTRAVENOUS; SUBCUTANEOUS at 09:26

## 2025-03-13 RX ADMIN — INSULIN GLARGINE 15 UNITS: 100 INJECTION, SOLUTION SUBCUTANEOUS at 20:14

## 2025-03-13 RX ADMIN — ASPIRIN 81 MG: 81 TABLET, COATED ORAL at 09:27

## 2025-03-13 RX ADMIN — SODIUM CHLORIDE, PRESERVATIVE FREE 10 ML: 5 INJECTION INTRAVENOUS at 09:27

## 2025-03-13 RX ADMIN — ALPRAZOLAM 1 MG: 1 TABLET ORAL at 05:47

## 2025-03-13 RX ADMIN — INSULIN LISPRO 5 UNITS: 100 INJECTION, SOLUTION INTRAVENOUS; SUBCUTANEOUS at 09:29

## 2025-03-13 RX ADMIN — INSULIN LISPRO 6 UNITS: 100 INJECTION, SOLUTION INTRAVENOUS; SUBCUTANEOUS at 18:10

## 2025-03-13 RX ADMIN — PANTOPRAZOLE SODIUM 40 MG: 40 TABLET, DELAYED RELEASE ORAL at 09:27

## 2025-03-13 RX ADMIN — POLYVINYL ALCOHOL, POVIDONE 1 DROP: 14; 6 SOLUTION/ DROPS OPHTHALMIC at 21:32

## 2025-03-13 RX ADMIN — PREDNISONE 60 MG: 20 TABLET ORAL at 09:27

## 2025-03-13 RX ADMIN — SODIUM ZIRCONIUM CYCLOSILICATE 10 G: 10 POWDER, FOR SUSPENSION ORAL at 13:34

## 2025-03-13 RX ADMIN — INSULIN LISPRO 5 UNITS: 100 INJECTION, SOLUTION INTRAVENOUS; SUBCUTANEOUS at 18:10

## 2025-03-13 RX ADMIN — ALLOPURINOL 300 MG: 100 TABLET ORAL at 09:25

## 2025-03-13 RX ADMIN — SODIUM CHLORIDE, PRESERVATIVE FREE 10 ML: 5 INJECTION INTRAVENOUS at 20:14

## 2025-03-13 RX ADMIN — INSULIN GLARGINE 5 UNITS: 100 INJECTION, SOLUTION SUBCUTANEOUS at 09:25

## 2025-03-13 RX ADMIN — GADOTERIDOL 20 ML: 279.3 INJECTION, SOLUTION INTRAVENOUS at 15:25

## 2025-03-13 RX ADMIN — INSULIN LISPRO 8 UNITS: 100 INJECTION, SOLUTION INTRAVENOUS; SUBCUTANEOUS at 20:14

## 2025-03-13 RX ADMIN — ENOXAPARIN SODIUM 30 MG: 100 INJECTION SUBCUTANEOUS at 20:14

## 2025-03-13 RX ADMIN — ENOXAPARIN SODIUM 30 MG: 100 INJECTION SUBCUTANEOUS at 09:27

## 2025-03-13 ASSESSMENT — PAIN SCALES - GENERAL: PAINLEVEL_OUTOF10: 0

## 2025-03-13 NOTE — PLAN OF CARE
Problem: Chronic Conditions and Co-morbidities  Goal: Patient's chronic conditions and co-morbidity symptoms are monitored and maintained or improved  3/13/2025 0400 by John Lane RN  Outcome: Progressing  3/12/2025 1532 by Salinas Wetzel RN  Outcome: Progressing  3/12/2025 1528 by Salinas Wetzel RN  Outcome: Progressing     Problem: Discharge Planning  Goal: Discharge to home or other facility with appropriate resources  3/13/2025 0400 by oJhn Lane RN  Outcome: Progressing  3/12/2025 1532 by Salinas Wetzel RN  Outcome: Progressing  3/12/2025 1528 by Salinas Wetzel RN  Outcome: Progressing     Problem: Safety - Adult  Goal: Free from fall injury  3/13/2025 0400 by John Lane RN  Outcome: Progressing  3/12/2025 1532 by Salinas Wetzel RN  Outcome: Progressing  3/12/2025 1528 by Salinas Wetzel RN  Outcome: Progressing     Problem: Pain  Goal: Verbalizes/displays adequate comfort level or baseline comfort level  3/13/2025 0400 by John Lane RN  Outcome: Progressing  3/12/2025 1532 by Salinas Wetzel RN  Outcome: Progressing  3/12/2025 1528 by Salinas Wetzel RN  Outcome: Progressing

## 2025-03-13 NOTE — PROGRESS NOTES
Neuro Inpatient Follow Up Note       Genaro Henao is a 54 y.o. 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  Since onset symptoms worsened.  He reported that his left lip was tingling and he had trouble moving it.  He now reports numbness in his left upper and lower extremities.  He has a history of traumatic head injury and 2004.  NIH was 3 and stroke alert was called.  Telestroke was utilized and patient was offered thrombolytics but he elected not to use them. Patient was given aspirin 324 mg.  Imaging was completed and CT head was unrevealing.  CTA head and neck showed a mild beaded appearance in the right ICA.  MRI brain without contrast was unremarkable.      + Right facial droop with left facial sensory deficits    Vitals have been stable on room air    There is no family present at bedside    Objective:     /74   Pulse 81   Temp 97.7 °F (36.5 °C)   Resp 18   Ht 1.854 m (6' 1\")   Wt (!) 138.1 kg (304 lb 8 oz)   SpO2 99%   BMI 40.17 kg/m²     General appearance: alert, appears stated age, cooperative and no distress  Head: normocephalic, without obvious abnormality, atraumatic  Eyes: conjunctivae/corneas clear. .  Neck: supple, symmetrical, trachea midline   Lungs: Unlabored breaths on room air  Heart: regular rate and rhythm  Extremities: normal, atraumatic, no cyanosis or edema  Pulses: 2+ and symmetric  Skin: color, texture, turgor normal---no rashes or lesions      Mental Status: Awake, alert oriented x 4, follows commands, pleasant    Appropriate attention/concentration  Intact fundus of knowledge  Intact memories    Speech: no dysarthria  Language: no aphasias    Cranial Nerves:  I: smell NA   II: visual acuity  NA   II: visual fields Full to  confrontation   II: pupils JEREMIAH   III,VII: ptosis R   III,IV,VI: extraocular muscles  EOMI without nystagmus   V: mastication Normal   V: facial light touch sensation  Decreased to the left   V,VII: corneal reflex     VII: facial muscle function - upper  Right facial droop   VII: facial muscle function - lower Right facial droop   VIII: hearing Normal   IX: soft palate elevation  Normal   IX,X: gag reflex    XI: trapezius strength  5/5   XI: sternocleidomastoid strength 5/5   XI: neck extension strength  5/5   XII: tongue strength  Normal     Motor:  Grossly 5/5 throughout  Normal bulk and tone  No abnormal movements  No drift    Sensory:  LT decreased to left face and left arm  Vibration decreased to left     Coordination:   FN, FFM CONNOR intact  HKS intact    Gait:  Not tested today    DTR:   + 1 throughout    No Babinskis  No Valdez's    No pathological reflexes    Laboratory/Radiology:     CBC with Differential:    Lab Results   Component Value Date/Time    WBC 24.2 03/13/2025 09:02 AM    RBC 5.33 03/13/2025 09:02 AM    HGB 16.7 03/13/2025 09:02 AM    HCT 50.0 03/13/2025 09:02 AM     03/13/2025 09:02 AM    MCV 93.8 03/13/2025 09:02 AM    MCH 31.3 03/13/2025 09:02 AM    MCHC 33.4 03/13/2025 09:02 AM    RDW 13.2 03/13/2025 09:02 AM    NRBC PENDING 03/13/2025 09:02 AM    METASPCT PENDING 03/13/2025 09:02 AM    LYMPHOPCT PENDING 03/13/2025 09:02 AM    LYMPHOPCT 24.3 06/05/2018 03:20 PM    PROMYELOPCT PENDING 03/13/2025 09:02 AM    MONOPCT PENDING 03/13/2025 09:02 AM    MONOPCT 9.8 06/05/2018 03:20 PM    MYELOPCT PENDING 03/13/2025 09:02 AM    EOSPCT PENDING 03/13/2025 09:02 AM    EOSPCT 1.6 06/05/2018 03:20 PM    BASOPCT PENDING 03/13/2025 09:02 AM    BASOPCT 0.6 06/05/2018 03:20 PM    MONOSABS PENDING 03/13/2025 09:02 AM    LYMPHSABS PENDING 03/13/2025 09:02 AM    EOSABS PENDING 03/13/2025 09:02 AM    BASOSABS PENDING 03/13/2025 09:02 AM     CMP:    Lab Results   Component Value Date/Time     03/13/2025

## 2025-03-13 NOTE — CARE COORDINATION
SOCIAL WORK/CASEMANAGEMENT TRANSITION OF CARE PLANNING( LUZ MARIA FLORY, -[phone]): met with pt in the room this a.m. he did very well with therapies and is independent. No needs anticipated for discharge home via wife. MRI of the brain, MRI of the IAC Posterior Fossa and MRV of the head are pending. Neurology is following. Labs are pending for lyme disease , BERENICE and CMP. Luz Maria Oquendo, MALINI  3/13/2025

## 2025-03-13 NOTE — PROGRESS NOTES
Spiritual Health History and Assessment/Progress Note  [facility] Katy Patterson    (P) Spiritual/Emotional Needs,  ,  ,      Name: Genaro Henao MRN: [id number]    Age: 54 y.o.     Sex: male   Language: English   Uatsdin: Non-Taoist   Acute cerebrovascular accident (CVA) ([facility])     Date: 3/13/2025                           Spiritual Assessment began in [facility] IMCU/NEURO        Referral/Consult From: (P) Rounding   Encounter Overview/Reason: (P) Spiritual/Emotional Needs  Service Provided For: (P) Patient    Jocelyne, Belief, Meaning:   Patient is connected with a jocelyne tradition or spiritual practice  Family/Friends are connected with a jocelyne tradition or spiritual practice      Importance and Influence:  Patient has spiritual/personal beliefs that influence decisions regarding their health  Family/Friends No family/friends present    Community:  Patient is connected with a spiritual community  Family/Friends No family/friends present    Assessment and Plan of Care:     Patient Interventions include: Facilitated expression of thoughts and feelings and Engaged in theological reflection  Family/Friends Interventions include: No family/friends present    Patient Plan of Care: Spiritual Care available upon further referral  Family/Friends Plan of Care: No family/friends present    Electronically signed by Chaplain Jennifer on 3/13/2025 at 2:58 PM

## 2025-03-13 NOTE — PROGRESS NOTES
Internal Medicine Progress Note    Patient's name: Genaro Henao  : 1970  Chief complaints (on day of admission): No chief complaint on file.  Admission date: 3/11/2025  Date of service: 3/13/2025   Room: [address] IMCU/NEURO  Primary care physician: Christophe Edwards MD  Reason for visit: Follow-up for stroke symptoms     Subjective  Genaro was seen and examined at bedside     Await MRI MRV today   Doing well but still having the same symptoms as yesterday   No improvement with prednisone yet  Sugars are elevated however have adjusted the insulin regimen     Review of Systems  There are no new complaints of chest pain, shortness of breath, abdominal pain, nausea, vomiting, diarrhea, constipation unless otherwise mentioned above.     Hospital Medications  Current Facility-Administered Medications   Medication Dose Route Frequency Provider Last Rate Last Admin    insulin glargine (LANTUS) injection vial 5 Units  5 Units SubCUTAneous Once Stepan Coley MD        insulin lispro (HUMALOG,ADMELOG) injection vial 5 Units  5 Units SubCUTAneous TID WC Stepan Coley MD        Finerenone TABS 10 mg (Patient Supplied)  10 mg Oral Daily Stepan Coley MD   10 mg at 25 1015    aspirin EC tablet 81 mg  81 mg Oral Daily Stepan Coley MD   81 mg at 25 1015    insulin lispro (HUMALOG,ADMELOG) injection vial 0-8 Units  0-8 Units SubCUTAneous 4x Daily AC & HS Stepan Coley MD   4 Units at 25    glucose chewable tablet 16 g  4 tablet Oral PRN Stepan Coley MD        dextrose bolus 10% 125 mL  125 mL IntraVENous PRN Stepan Coley MD        Or    dextrose bolus 10% 250 mL  250 mL IntraVENous PRN Stepan Coley MD        glucagon injection 1 mg  1 mg SubCUTAneous PRN Stepan Coley MD        dextrose 10 % infusion   IntraVENous Continuous PRN Stepan Coley MD        predniSONE (DELTASONE) tablet 60 mg  60 mg Oral Daily Oumou Sams MD   60 mg at 25 1220    Followed by    [START ON 3/17/2025]  predniSONE (DELTASONE) tablet 50 mg  50 mg Oral Daily Oumou Sams MD        Followed by    [START ON 3/18/2025] predniSONE (DELTASONE) tablet 40 mg  40 mg Oral Daily Oumou Sams MD        Followed by    [START ON 3/19/2025] predniSONE (DELTASONE) tablet 30 mg  30 mg Oral Daily Oumou Sams MD        Followed by    [START ON 3/20/2025] predniSONE (DELTASONE) tablet 20 mg  20 mg Oral Daily Oumou Sams MD        Followed by    [START ON 3/21/2025] predniSONE (DELTASONE) tablet 20 mg  20 mg Oral Daily Oumou Sams MD        Followed by    [START ON 3/22/2025] predniSONE (DELTASONE) tablet 10 mg  10 mg Oral Daily Oumou Sams MD        Followed by    [START ON 3/23/2025] predniSONE (DELTASONE) tablet 5 mg  5 mg Oral Daily Oumou Sams MD        ALPRAZolam (XANAX) tablet 1 mg  1 mg Oral BID PRN Stepan Coley MD   1 mg at 03/13/25 0547    sodium chloride flush 0.9 % injection 10 mL  10 mL IntraVENous 2 times per day Stepan Coley MD   10 mL at 03/12/25 2038    sodium chloride flush 0.9 % injection 10 mL  10 mL IntraVENous PRN Stepan Coley MD        0.9 % sodium chloride infusion   IntraVENous PRN Stepan Coley MD        enoxaparin Sodium (LOVENOX) injection 30 mg  30 mg SubCUTAneous BID Stepan Coley MD   30 mg at 03/12/25 2039    ondansetron (ZOFRAN-ODT) disintegrating tablet 4 mg  4 mg Oral Q8H PRN Stepan Coley MD        Or    ondansetron (ZOFRAN) injection 4 mg  4 mg IntraVENous Q6H PRN Stepan Coley MD        senna (SENOKOT) tablet 8.6 mg  1 tablet Oral Daily PRN Stepan Coley MD        acetaminophen (TYLENOL) tablet 650 mg  650 mg Oral Q6H PRN Stepan Coley MD   650 mg at 03/12/25 0819    Or    acetaminophen (TYLENOL) suppository 650 mg  650 mg Rectal Q6H PRN Stepan Coley MD        allopurinol (ZYLOPRIM) tablet 300 mg  300 mg Oral QAM David, Fritz, PA-C   300 mg at 03/12/25 0820    pantoprazole (PROTONIX) tablet 40 mg  40 mg Oral QAM David, Fritz, PA-C   40 mg at 03/12/25 0821  [I63.9]     Sacroiliac dysfunction [M53.3]     Lumbosacral spondylosis without myelopathy [M47.817]     DDD (degenerative disc disease), lumbar [M51.369]     Type 2 diabetes mellitus without complication (HCC) [E11.9]          Plan  Stroke like symptoms   Left-sided facial and extremities numbness, slight slurred speech  Presented to the ED 3/10/25 declined TNKase due to his more than 20 year ago history of ICH   Neurology consultation   CTA negative for LVO   MRI brain negative for acute stroke   ECHO non diagnostic for shunt   Neurology consultation discussed with Dr Lewis     Hyperglycemia 2/2 steroids   Titrate insulin coverage     Discharge plan: await contrasted brain MRI / MRV, DC once cleared by neurology team    Electronically signed by Stepan Coley MD on 3/13/2025 at 8:55 AM    I can be reached through [employer].

## 2025-03-14 VITALS
TEMPERATURE: 97.9 F | BODY MASS INDEX: 40.36 KG/M2 | WEIGHT: 304.5 LBS | RESPIRATION RATE: 18 BRPM | HEIGHT: 73 IN | HEART RATE: 63 BPM | DIASTOLIC BLOOD PRESSURE: 73 MMHG | OXYGEN SATURATION: 98 % | SYSTOLIC BLOOD PRESSURE: 127 MMHG

## 2025-03-14 LAB
ALBUMIN SERPL-MCNC: 4.2 G/DL (ref 3.5–5.2)
ALP SERPL-CCNC: 71 U/L (ref 40–129)
ALT SERPL-CCNC: 27 U/L (ref 0–40)
ANION GAP SERPL CALCULATED.3IONS-SCNC: 14 MMOL/L (ref 7–16)
AST SERPL-CCNC: 22 U/L (ref 0–39)
BASOPHILS # BLD: 0 K/UL (ref 0–0.2)
BASOPHILS NFR BLD: 0 % (ref 0–2)
BILIRUB SERPL-MCNC: 0.3 MG/DL (ref 0–1.2)
BUN SERPL-MCNC: 17 MG/DL (ref 6–20)
CALCIUM SERPL-MCNC: 9.3 MG/DL (ref 8.6–10.2)
CHLORIDE SERPL-SCNC: 103 MMOL/L (ref 98–107)
CO2 SERPL-SCNC: 23 MMOL/L (ref 22–29)
CREAT SERPL-MCNC: 1.1 MG/DL (ref 0.7–1.2)
EKG ATRIAL RATE: 61 BPM
EKG P AXIS: 42 DEGREES
EKG P-R INTERVAL: 160 MS
EKG Q-T INTERVAL: 374 MS
EKG QRS DURATION: 84 MS
EKG QTC CALCULATION (BAZETT): 376 MS
EKG R AXIS: -23 DEGREES
EKG T AXIS: 10 DEGREES
EKG VENTRICULAR RATE: 61 BPM
EOSINOPHIL # BLD: 0 K/UL (ref 0.05–0.5)
EOSINOPHILS RELATIVE PERCENT: 0 % (ref 0–6)
ERYTHROCYTE [DISTWIDTH] IN BLOOD BY AUTOMATED COUNT: 13.3 % (ref 11.5–15)
GFR, ESTIMATED: 79 ML/MIN/1.73M2
GLUCOSE BLD-MCNC: 141 MG/DL (ref 74–99)
GLUCOSE BLD-MCNC: 187 MG/DL (ref 74–99)
GLUCOSE SERPL-MCNC: 178 MG/DL (ref 74–99)
HCT VFR BLD AUTO: 48 % (ref 37–54)
HGB BLD-MCNC: 16.2 G/DL (ref 12.5–16.5)
LYMPHOCYTES NFR BLD: 1.98 K/UL (ref 1.5–4)
LYMPHOCYTES RELATIVE PERCENT: 9 % (ref 20–42)
MCH RBC QN AUTO: 31.9 PG (ref 26–35)
MCHC RBC AUTO-ENTMCNC: 33.8 G/DL (ref 32–34.5)
MCV RBC AUTO: 94.5 FL (ref 80–99.9)
MONOCYTES NFR BLD: 1.98 K/UL (ref 0.1–0.95)
MONOCYTES NFR BLD: 9 % (ref 2–12)
NEUTROPHILS NFR BLD: 82 % (ref 43–80)
NEUTS SEG NFR BLD: 18.64 K/UL (ref 1.8–7.3)
PLATELET # BLD AUTO: 358 K/UL (ref 130–450)
PMV BLD AUTO: 9.6 FL (ref 7–12)
POTASSIUM SERPL-SCNC: 4.1 MMOL/L (ref 3.5–5)
PROT SERPL-MCNC: 7.3 G/DL (ref 6.4–8.3)
RBC # BLD AUTO: 5.08 M/UL (ref 3.8–5.8)
RBC # BLD: NORMAL 10*6/UL
SODIUM SERPL-SCNC: 140 MMOL/L (ref 132–146)
WBC OTHER # BLD: 22.6 K/UL (ref 4.5–11.5)

## 2025-03-14 PROCEDURE — 80053 COMPREHEN METABOLIC PANEL: CPT

## 2025-03-14 PROCEDURE — 82962 GLUCOSE BLOOD TEST: CPT

## 2025-03-14 PROCEDURE — 6370000000 HC RX 637 (ALT 250 FOR IP)

## 2025-03-14 PROCEDURE — 6370000000 HC RX 637 (ALT 250 FOR IP): Performed by: PHYSICIAN ASSISTANT

## 2025-03-14 PROCEDURE — 99232 SBSQ HOSP IP/OBS MODERATE 35: CPT | Performed by: NURSE PRACTITIONER

## 2025-03-14 PROCEDURE — 36415 COLL VENOUS BLD VENIPUNCTURE: CPT

## 2025-03-14 PROCEDURE — 85025 COMPLETE CBC W/AUTO DIFF WBC: CPT

## 2025-03-14 PROCEDURE — 6370000000 HC RX 637 (ALT 250 FOR IP): Performed by: STUDENT IN AN ORGANIZED HEALTH CARE EDUCATION/TRAINING PROGRAM

## 2025-03-14 RX ORDER — ASPIRIN 81 MG/1
81 TABLET ORAL DAILY
Qty: 30 TABLET | Refills: 3 | Status: SHIPPED | OUTPATIENT
Start: 2025-03-14

## 2025-03-14 RX ORDER — PREDNISONE 5 MG/1
TABLET ORAL
Qty: 71 TABLET | Refills: 0 | Status: SHIPPED | OUTPATIENT
Start: 2025-03-14 | End: 2025-03-24

## 2025-03-14 RX ADMIN — PREDNISONE 60 MG: 20 TABLET ORAL at 08:40

## 2025-03-14 RX ADMIN — INSULIN LISPRO 2 UNITS: 100 INJECTION, SOLUTION INTRAVENOUS; SUBCUTANEOUS at 08:39

## 2025-03-14 RX ADMIN — GLIPIZIDE 10 MG: 5 TABLET ORAL at 05:22

## 2025-03-14 RX ADMIN — PANTOPRAZOLE SODIUM 40 MG: 40 TABLET, DELAYED RELEASE ORAL at 08:40

## 2025-03-14 RX ADMIN — ASPIRIN 81 MG: 81 TABLET, COATED ORAL at 08:40

## 2025-03-14 RX ADMIN — ALLOPURINOL 300 MG: 100 TABLET ORAL at 08:40

## 2025-03-14 RX ADMIN — INSULIN LISPRO 5 UNITS: 100 INJECTION, SOLUTION INTRAVENOUS; SUBCUTANEOUS at 08:38

## 2025-03-14 NOTE — CARE COORDINATION
SOCIAL WORK/CASEMANAGEMENT TRANSITION OF CARE PLANNING( BEBETO RUTH, MALINI [phone]): MRI's and  MRV done. Waiting for neurology to round to see if pt can go home today. Met with pt and he has no needs.  this a.m. WBC 22.6 up from 24.2 MALINI Joel  3/14/2025

## 2025-03-14 NOTE — PROGRESS NOTES
Neuro Inpatient Follow Up Note       Genaro Henao is a 54 y.o. 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  Since onset symptoms worsened.  He reported that his left lip was tingling and he had trouble moving it.  He now reports numbness in his left upper and lower extremities.  He has a history of traumatic head injury and 2004.  NIH was 3 and stroke alert was called.  Telestroke was utilized and patient was offered thrombolytics but he elected not to use them. Patient was given aspirin 324 mg.  Imaging was completed and CT head was unrevealing.  CTA head and neck showed a mild beaded appearance in the right ICA.  MRI brain without contrast was unremarkable.      MRI brain with contrast, MRV and MRI IAC all within normal.  Lymes, ACE--both pending, BERENICE negative.  Patient continues on prednisone without improvement in his presentation.  He does voice being ready to go.  I spoke to Dr. Lewis on imaging, workup and plan of care.  Recommends LP if symptoms do not resolve within 2 weeks.  Spoke to RN who arranged this outpatient LP, stated that department did not think next week was an option although preference for the patient given his scheduled later next week.    + Right facial droop with left facial sensory deficits    Vitals have been stable on room air    There is no family present at bedside    Objective:     /73   Pulse 63   Temp 97.9 °F (36.6 °C)   Resp 18   Ht 1.854 m (6' 1\")   Wt (!) 138.1 kg (304 lb 8 oz)   SpO2 98%   BMI 40.17 kg/m²     General appearance: alert, appears stated age, cooperative and no distress  Head: normocephalic, without obvious abnormality, atraumatic  Eyes: conjunctivae/corneas clear. .  Neck: supple, symmetrical, trachea

## 2025-03-15 LAB
ACE SERPL-CCNC: 33 U/L (ref 16–85)
B BURGDOR AB SER IA-ACNC: 0.26 IV

## 2025-03-15 NOTE — DISCHARGE SUMMARY
Internal Medicine Discharge Summary    NAME: Genaro Henao :  1970  MRN:  [id number] PCP:Christophe Edwards MD    ADMITTED: 3/11/2025   DISCHARGED: 3/14/2025 12:46 PM    ADMITTING PHYSICIAN: [place] att. providers found    PCP: Christophe Edwards MD    CONSULTANT(S):   IP CONSULT TO NEUROLOGY     ADMITTING DIAGNOSIS:   CVA (cerebral vascular accident) (HCC) [I63.9]  Acute cerebrovascular accident (CVA) (HCC) [I63.9]     Please see H&P for further details    DISCHARGE DIAGNOSES:   Active Hospital Problems    Diagnosis     Lt facial numbness [R20.0]     Facial droop [R29.810]     5th nerve palsy [G50.9]     7th nerve palsy [G51.0]     Acute cerebrovascular accident (CVA) (HCC) [I63.9]     Sacroiliac dysfunction [M53.3]     Lumbosacral spondylosis without myelopathy [M47.817]     DDD (degenerative disc disease), lumbar [M51.369]     Type 2 diabetes mellitus without complication (HCC) [E11.9]        BRIEF HISTORY OF PRESENT ILLNESS: Genaro Henao is a 54 y.o. male patient of Christophe Edwards MD who  has a past medical history of Acid reflux, ADHD, Anxiety, DM (diabetes mellitus) (HCC), Left foot drop, Left leg pain, Left lumbar radiculitis, Low back pain, and Protein in urine. who originally had no chief complaint listed for this encounter. at presentation on 3/11/2025, and was found to have CVA (cerebral vascular accident) (HCC) [I63.9]  Acute cerebrovascular accident (CVA) (HCC) [I63.9] after workup.    Please see H&P for further details.    HOSPITAL COURSE:   The patient presented to the hospital with the chief complaint of No chief complaint on file.  . The patient was admitted to the hospital.     Stroke like symptoms   Left-sided facial and extremities numbness, slight slurred speech  Presented to the ED 3/10/25 declined TNKase due to his more than 20 year ago history of ICH   Neurology consultation   CTA negative for LVO   MRI brain negative for acute stroke   ECHO non diagnostic for shunt   Neurology consultation

## 2025-03-24 NOTE — PROGRESS NOTES
TEXT:    Patient admitted with BMI 40.17 kg/m?. If possible, please document in   progress notes and discharge summary if you are evaluating and /or treating   any of the following:    The medical record reflects the following:  Risk Factors: Diabetes, GERD, ADHD, anxiety  Clinical Indicators: DS by IM on 03/14/2025 VITALS: BMI 40.17 kg/m? Ht 1.854 m   (6' 1\")    Treatment: Regular diet Regular; 4 carb choices      ThanksAnne, [facility]-CDS    Specificity of obesity and morbid obesity should be reported based on   physician documentation, as there are several published classifications and   definitions?  MS-DRG Training Guide. CDC:   https://www.cdc.gov/obesity/basics/adult-defining.html. WHO:   https://www.who.int/news-room/fact-sheets/detail/obesity-and-overweight. NIH:   https://www.nhlbi.nih.gov/health/educational/lose_wt/BMI/bmi_dis.htm  Options provided:  -- Morbid obesity  -- Other - I will add my own diagnosis  -- Disagree - Not applicable / Not valid  -- Disagree - Clinically unable to determine / Unknown  -- Refer to Clinical Documentation Reviewer    PROVIDER RESPONSE TEXT:    This patient has morbid obesity.    Query created by: Anne Pryor on 3/20/2025 3:05 AM      Electronically signed by:  Stepan Coley MD 3/24/2025 3:14 PM

## 2025-05-05 ENCOUNTER — HOSPITAL ENCOUNTER (OUTPATIENT)
Age: 55
Discharge: HOME | End: 2025-05-05
Payer: COMMERCIAL

## 2025-05-05 LAB
25(OH)D3 SERPL-MCNC: 25.2 NG/ML (ref 30–100)
ALBUMIN SERPL-MCNC: 4.9 G/DL (ref 3.5–5.2)
ALP SERPL-CCNC: 84 U/L (ref 40–129)
ALT SERPL-CCNC: 23 U/L (ref 0–40)
ANION GAP SERPL CALCULATED.3IONS-SCNC: 15 MMOL/L (ref 7–16)
AST SERPL-CCNC: 24 U/L (ref 0–39)
BASOPHILS # BLD: 0.08 K/UL (ref 0–0.2)
BASOPHILS NFR BLD: 1 % (ref 0–2)
BILIRUB SERPL-MCNC: 0.6 MG/DL (ref 0–1.2)
BUN SERPL-MCNC: 13 MG/DL (ref 6–20)
CALCIUM SERPL-MCNC: 10.1 MG/DL (ref 8.6–10.2)
CHLORIDE SERPL-SCNC: 102 MMOL/L (ref 98–107)
CHOLEST SERPL-MCNC: 178 MG/DL
CO2 SERPL-SCNC: 23 MMOL/L (ref 22–29)
CREAT SERPL-MCNC: 1.2 MG/DL (ref 0.7–1.2)
CREAT UR-MCNC: 193 MG/DL (ref 40–278)
EOSINOPHIL # BLD: 0.23 K/UL (ref 0.05–0.5)
EOSINOPHILS RELATIVE PERCENT: 2 % (ref 0–6)
ERYTHROCYTE [DISTWIDTH] IN BLOOD BY AUTOMATED COUNT: 13.7 % (ref 11.5–15)
GFR, ESTIMATED: 76 ML/MIN/1.73M2
GLUCOSE SERPL-MCNC: 134 MG/DL (ref 74–99)
HBA1C MFR BLD: 8.5 % (ref 4–5.6)
HCT VFR BLD AUTO: 49 % (ref 37–54)
HDLC SERPL-MCNC: 54 MG/DL
HGB BLD-MCNC: 16.4 G/DL (ref 12.5–16.5)
IMM GRANULOCYTES # BLD AUTO: 0.09 K/UL (ref 0–0.58)
IMM GRANULOCYTES NFR BLD: 1 % (ref 0–5)
LDLC SERPL CALC-MCNC: 102 MG/DL
LYMPHOCYTES NFR BLD: 2.22 K/UL (ref 1.5–4)
LYMPHOCYTES RELATIVE PERCENT: 21 % (ref 20–42)
MCH RBC QN AUTO: 31.5 PG (ref 26–35)
MCHC RBC AUTO-ENTMCNC: 33.5 G/DL (ref 32–34.5)
MCV RBC AUTO: 94.2 FL (ref 80–99.9)
MICROALBUMIN UR-MCNC: 393 MG/L (ref 0–20)
MICROALBUMIN/CREAT UR-RTO: 204 MCG/MG CREAT (ref 0–30)
MONOCYTES NFR BLD: 1.04 K/UL (ref 0.1–0.95)
MONOCYTES NFR BLD: 10 % (ref 2–12)
NEUTROPHILS NFR BLD: 65 % (ref 43–80)
NEUTS SEG NFR BLD: 6.9 K/UL (ref 1.8–7.3)
PHOSPHATE SERPL-MCNC: 3.1 MG/DL (ref 2.5–4.5)
PLATELET # BLD AUTO: 313 K/UL (ref 130–450)
PMV BLD AUTO: 9.7 FL (ref 7–12)
POTASSIUM SERPL-SCNC: 4.1 MMOL/L (ref 3.5–5)
PROT SERPL-MCNC: 8 G/DL (ref 6.4–8.3)
PTH-INTACT SERPL-MCNC: 43 PG/ML (ref 15–65)
RBC # BLD AUTO: 5.2 M/UL (ref 3.8–5.8)
SODIUM SERPL-SCNC: 140 MMOL/L (ref 132–146)
TRIGL SERPL-MCNC: 111 MG/DL
URATE SERPL-MCNC: 4.6 MG/DL (ref 3.4–7)
VLDLC SERPL CALC-MCNC: 22 MG/DL
WBC OTHER # BLD: 10.6 K/UL (ref 4.5–11.5)

## 2025-05-05 PROCEDURE — 82043 UR ALBUMIN QUANTITATIVE: CPT

## 2025-05-05 PROCEDURE — 82306 VITAMIN D 25 HYDROXY: CPT

## 2025-05-05 PROCEDURE — 84100 ASSAY OF PHOSPHORUS: CPT

## 2025-05-05 PROCEDURE — 80061 LIPID PANEL: CPT

## 2025-05-05 PROCEDURE — 36415 COLL VENOUS BLD VENIPUNCTURE: CPT

## 2025-05-05 PROCEDURE — 83970 ASSAY OF PARATHORMONE: CPT

## 2025-05-05 PROCEDURE — 80053 COMPREHEN METABOLIC PANEL: CPT

## 2025-05-05 PROCEDURE — 84550 ASSAY OF BLOOD/URIC ACID: CPT

## 2025-05-05 PROCEDURE — 82570 ASSAY OF URINE CREATININE: CPT

## 2025-05-05 PROCEDURE — 85025 COMPLETE CBC W/AUTO DIFF WBC: CPT

## 2025-05-05 PROCEDURE — 83036 HEMOGLOBIN GLYCOSYLATED A1C: CPT

## (undated) DEVICE — ADHESIVE SKIN CLOSURE TOP 36 CC HI VISC DERMBND MINI

## (undated) DEVICE — NEEDLE HYPO 25GA L1.5IN BLU POLYPR HUB S STL REG BVL STR

## (undated) DEVICE — GLOVE SURG SZ 65 THK91MIL LTX FREE SYN POLYISOPRENE

## (undated) DEVICE — NON-DEHP CATHETER EXTENSION SET, MALE LUER LOCK ADAPTER

## (undated) DEVICE — 3M(TM) MEDIPORE(TM) +PAD SOFT CLOTH ADHESIVE WOUND DRESSING 3569: Brand: 3M™ MEDIPORE™

## (undated) DEVICE — 5.0MM PRECISION ROUND

## (undated) DEVICE — GLOVE ORANGE PI 7   MSG9070

## (undated) DEVICE — LUMBAR LAMINECTOMY: Brand: MEDLINE INDUSTRIES, INC.

## (undated) DEVICE — 6 ML SYRINGE LUER-LOCK TIP: Brand: MONOJECT

## (undated) DEVICE — GLOVE SURG 8.5 PF POLYMER WHT STRL SIGN LTX ESSENTIAL LTX

## (undated) DEVICE — HYPODERMIC SAFETY NEEDLE: Brand: MAGELLAN

## (undated) DEVICE — DRAPE,REIN 53X77,STERILE: Brand: MEDLINE

## (undated) DEVICE — 22GX5" WHITACRE SPINAL NEEDLE: Brand: MEDLINE

## (undated) DEVICE — NEEDLE HYPO 18GA L1.5IN PNK POLYPR HUB S STL THN WALL FILL

## (undated) DEVICE — GLOVE ORANGE PI 8   MSG9080

## (undated) DEVICE — 12 ML SYRINGE,LUER-LOCK TIP: Brand: MONOJECT

## (undated) DEVICE — TUBING SUCT 12FR MAL ALUM SHFT FN CAP VENT UNIV CONN W/ OBT

## (undated) DEVICE — Z DISCONTINUED APPLICATOR SURG PREP 0.35OZ 2% CHG 70% ISO ALC W/ HI LT

## (undated) DEVICE — SYRINGE 20ML LL S/C 50

## (undated) DEVICE — 3M™ RED DOT™ MONITORING ELECTRODE WITH FOAM TAPE AND STICKY GEL 2560, 50/BAG, 20/CASE, 72/PLT: Brand: RED DOT™

## (undated) DEVICE — 3 ML SYRINGE LUER-LOCK TIP: Brand: MONOJECT

## (undated) DEVICE — GAUZE,SPONGE,4"X4",12PLY,STERILE,LF,2'S: Brand: MEDLINE

## (undated) DEVICE — BANDAGE ADH W1XL3IN NAT FAB WVN FLX DURABLE N ADH PD SEAL

## (undated) DEVICE — SYRINGE, LUER LOCK, 5ML: Brand: MEDLINE

## (undated) DEVICE — GLOVE ORANGE PI 7 1/2   MSG9075

## (undated) DEVICE — GOWN,SIRUS,FABRNF,L,20/CS: Brand: MEDLINE

## (undated) DEVICE — APPLICATOR PREP 26ML 0.7% IOD POVACRYLEX 74% ISO ALC ST

## (undated) DEVICE — TOWEL OR BLUEE 16X26IN ST 8 PACK ORB08 16X26ORTWL

## (undated) DEVICE — BLADE ES L6IN ELASTOMERIC COAT EXT DURABLE BEND UPTO 90DEG

## (undated) DEVICE — READY WET SKIN SCRUB TRAY-LF: Brand: MEDLINE INDUSTRIES, INC.

## (undated) DEVICE — NEEDLE SPNL L3.5IN PNK HUB S STL REG WALL FIT STYL W/ QNCKE

## (undated) DEVICE — Device: Brand: PORTEX

## (undated) DEVICE — 3M™ IOBAN™ 2 ANTIMICROBIAL INCISE DRAPE 6650EZ: Brand: IOBAN™ 2

## (undated) DEVICE — NEEDLE SPNL 22GA L5IN BLK HUB S STL W/ QNCKE PNT W/OUT

## (undated) DEVICE — TUBING, SUCTION, 3/16" X 12', STRAIGHT: Brand: MEDLINE

## (undated) DEVICE — JACKSON TABLE POSITIONER KIT: Brand: MEDLINE INDUSTRIES, INC.

## (undated) DEVICE — COUNTER NDL 10 COUNT HLD 20 FOAM BLK SGL MAG

## (undated) DEVICE — APPLICATOR MEDICATED 10.5 CC SOLUTION HI LT ORNG CHLORAPREP